# Patient Record
Sex: FEMALE | Race: WHITE | HISPANIC OR LATINO | Employment: FULL TIME | URBAN - METROPOLITAN AREA
[De-identification: names, ages, dates, MRNs, and addresses within clinical notes are randomized per-mention and may not be internally consistent; named-entity substitution may affect disease eponyms.]

---

## 2018-11-30 ENCOUNTER — APPOINTMENT (OUTPATIENT)
Dept: OBGYN | Facility: CLINIC | Age: 23
End: 2018-11-30

## 2019-05-03 ENCOUNTER — HOSPITAL ENCOUNTER (OUTPATIENT)
Dept: LAB | Facility: MEDICAL CENTER | Age: 24
End: 2019-05-03
Attending: SPECIALIST
Payer: COMMERCIAL

## 2019-05-03 PROCEDURE — 87624 HPV HI-RISK TYP POOLED RSLT: CPT

## 2019-05-03 PROCEDURE — 87491 CHLMYD TRACH DNA AMP PROBE: CPT

## 2019-05-03 PROCEDURE — 87591 N.GONORRHOEAE DNA AMP PROB: CPT

## 2019-05-03 PROCEDURE — 88175 CYTOPATH C/V AUTO FLUID REDO: CPT

## 2019-05-06 LAB
C TRACH DNA GENITAL QL NAA+PROBE: NEGATIVE
CYTOLOGY REG CYTOL: NORMAL
HPV HR 12 DNA CVX QL NAA+PROBE: NEGATIVE
HPV16 DNA SPEC QL NAA+PROBE: NEGATIVE
HPV18 DNA SPEC QL NAA+PROBE: NEGATIVE
N GONORRHOEA DNA GENITAL QL NAA+PROBE: NEGATIVE
SPECIMEN SOURCE: NORMAL
SPECIMEN SOURCE: NORMAL

## 2019-11-07 ENCOUNTER — APPOINTMENT (OUTPATIENT)
Dept: RADIOLOGY | Facility: MEDICAL CENTER | Age: 24
End: 2019-11-07
Attending: EMERGENCY MEDICINE
Payer: OTHER MISCELLANEOUS

## 2019-11-07 ENCOUNTER — HOSPITAL ENCOUNTER (EMERGENCY)
Facility: MEDICAL CENTER | Age: 24
End: 2019-11-07
Attending: EMERGENCY MEDICINE
Payer: OTHER MISCELLANEOUS

## 2019-11-07 VITALS
HEART RATE: 75 BPM | WEIGHT: 180 LBS | OXYGEN SATURATION: 96 % | DIASTOLIC BLOOD PRESSURE: 49 MMHG | TEMPERATURE: 98.2 F | RESPIRATION RATE: 16 BRPM | HEIGHT: 57 IN | SYSTOLIC BLOOD PRESSURE: 103 MMHG | BODY MASS INDEX: 38.83 KG/M2

## 2019-11-07 DIAGNOSIS — S20.219A CONTUSION OF CHEST WALL, UNSPECIFIED LATERALITY, INITIAL ENCOUNTER: ICD-10-CM
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
## 2019-11-07 PROCEDURE — 307740 HCHG GREEN TRAUMA TEAM SERVICES

## 2019-11-07 PROCEDURE — A9270 NON-COVERED ITEM OR SERVICE: HCPCS | Performed by: EMERGENCY MEDICINE

## 2019-11-07 PROCEDURE — 72100 X-RAY EXAM L-S SPINE 2/3 VWS: CPT

## 2019-11-07 PROCEDURE — 99284 EMERGENCY DEPT VISIT MOD MDM: CPT

## 2019-11-07 PROCEDURE — 71045 X-RAY EXAM CHEST 1 VIEW: CPT

## 2019-11-07 PROCEDURE — 700102 HCHG RX REV CODE 250 W/ 637 OVERRIDE(OP): Performed by: EMERGENCY MEDICINE

## 2019-11-07 RX ORDER — NAPROXEN 500 MG/1
500 TABLET ORAL
Qty: 20 TAB | Refills: 0 | Status: SHIPPED | OUTPATIENT
Start: 2019-11-07 | End: 2019-11-12

## 2019-11-07 RX ORDER — ACETAMINOPHEN 325 MG/1
975 TABLET ORAL ONCE
Status: COMPLETED | OUTPATIENT
Start: 2019-11-07 | End: 2019-11-07

## 2019-11-07 RX ADMIN — ACETAMINOPHEN 975 MG: 325 TABLET, FILM COATED ORAL at 17:14

## 2019-11-07 ASSESSMENT — LIFESTYLE VARIABLES: DO YOU DRINK ALCOHOL: NO

## 2019-11-08 NOTE — ED NOTES
Medication reconciliation updated and complete per pt at bedside  Allergies have been verified   No oral ABX within the last 14 days  Pt home pharmacy:[facility]

## 2019-11-08 NOTE — ED NOTES
Pt. Discharged at this time, pt. Provided with discharge paperwork, work excuse and prescriptions. Pt. Denies questions or concerns at this time, pt. Ambulated out of ED independently with a steady gait.
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
## 2020-01-06 ENCOUNTER — APPOINTMENT (OUTPATIENT)
Dept: RADIOLOGY | Facility: MEDICAL CENTER | Age: 25
End: 2020-01-06
Attending: EMERGENCY MEDICINE
Payer: OTHER MISCELLANEOUS

## 2020-01-06 ENCOUNTER — HOSPITAL ENCOUNTER (EMERGENCY)
Facility: MEDICAL CENTER | Age: 25
End: 2020-01-06
Attending: EMERGENCY MEDICINE
Payer: OTHER MISCELLANEOUS

## 2020-01-06 VITALS
DIASTOLIC BLOOD PRESSURE: 77 MMHG | HEIGHT: 57 IN | BODY MASS INDEX: 36.96 KG/M2 | OXYGEN SATURATION: 98 % | SYSTOLIC BLOOD PRESSURE: 130 MMHG | RESPIRATION RATE: 16 BRPM | HEART RATE: 80 BPM | TEMPERATURE: 98 F | WEIGHT: 171.3 LBS

## 2020-01-06 DIAGNOSIS — S09.90XA CLOSED HEAD INJURY, INITIAL ENCOUNTER: ICD-10-CM

## 2020-01-06 DIAGNOSIS — R51.9 ACUTE NONINTRACTABLE HEADACHE, UNSPECIFIED HEADACHE TYPE: ICD-10-CM

## 2020-01-06 DIAGNOSIS — R79.89 ABNORMAL LFTS: ICD-10-CM

## 2020-01-06 LAB
ALBUMIN SERPL BCP-MCNC: 4.6 G/DL (ref 3.2–4.9)
ALBUMIN/GLOB SERPL: 1.5 G/DL
ALP SERPL-CCNC: 81 U/L (ref 30–99)
ALT SERPL-CCNC: 86 U/L (ref 2–50)
ANION GAP SERPL CALC-SCNC: 11 MMOL/L (ref 0–11.9)
AST SERPL-CCNC: 49 U/L (ref 12–45)
BASOPHILS # BLD AUTO: 0.3 % (ref 0–1.8)
BASOPHILS # BLD: 0.03 K/UL (ref 0–0.12)
BILIRUB SERPL-MCNC: 0.5 MG/DL (ref 0.1–1.5)
BUN SERPL-MCNC: 12 MG/DL (ref 8–22)
CALCIUM SERPL-MCNC: 9.6 MG/DL (ref 8.5–10.5)
CHLORIDE SERPL-SCNC: 104 MMOL/L (ref 96–112)
CO2 SERPL-SCNC: 24 MMOL/L (ref 20–33)
CREAT SERPL-MCNC: 0.94 MG/DL (ref 0.5–1.4)
EOSINOPHIL # BLD AUTO: 0.16 K/UL (ref 0–0.51)
EOSINOPHIL NFR BLD: 1.7 % (ref 0–6.9)
ERYTHROCYTE [DISTWIDTH] IN BLOOD BY AUTOMATED COUNT: 42.5 FL (ref 35.9–50)
GLOBULIN SER CALC-MCNC: 3 G/DL (ref 1.9–3.5)
GLUCOSE SERPL-MCNC: 88 MG/DL (ref 65–99)
HCG SERPL QL: NEGATIVE
HCT VFR BLD AUTO: 43.9 % (ref 37–47)
HGB BLD-MCNC: 15.9 G/DL (ref 12–16)
IMM GRANULOCYTES # BLD AUTO: 0.03 K/UL (ref 0–0.11)
IMM GRANULOCYTES NFR BLD AUTO: 0.3 % (ref 0–0.9)
LYMPHOCYTES # BLD AUTO: 4.12 K/UL (ref 1–4.8)
LYMPHOCYTES NFR BLD: 44.3 % (ref 22–41)
MCH RBC QN AUTO: 34.2 PG (ref 27–33)
MCHC RBC AUTO-ENTMCNC: 36.2 G/DL (ref 33.6–35)
MCV RBC AUTO: 94.4 FL (ref 81.4–97.8)
MONOCYTES # BLD AUTO: 0.56 K/UL (ref 0–0.85)
MONOCYTES NFR BLD AUTO: 6 % (ref 0–13.4)
NEUTROPHILS # BLD AUTO: 4.39 K/UL (ref 2–7.15)
NEUTROPHILS NFR BLD: 47.4 % (ref 44–72)
NRBC # BLD AUTO: 0 K/UL
NRBC BLD-RTO: 0 /100 WBC
PLATELET # BLD AUTO: 163 K/UL (ref 164–446)
PMV BLD AUTO: 12.7 FL (ref 9–12.9)
POTASSIUM SERPL-SCNC: 4 MMOL/L (ref 3.6–5.5)
PROT SERPL-MCNC: 7.6 G/DL (ref 6–8.2)
RBC # BLD AUTO: 4.65 M/UL (ref 4.2–5.4)
SODIUM SERPL-SCNC: 139 MMOL/L (ref 135–145)
WBC # BLD AUTO: 9.3 K/UL (ref 4.8–10.8)

## 2020-01-06 PROCEDURE — 700117 HCHG RX CONTRAST REV CODE 255: Performed by: EMERGENCY MEDICINE

## 2020-01-06 PROCEDURE — 96375 TX/PRO/DX INJ NEW DRUG ADDON: CPT

## 2020-01-06 PROCEDURE — 85025 COMPLETE CBC W/AUTO DIFF WBC: CPT

## 2020-01-06 PROCEDURE — 36415 COLL VENOUS BLD VENIPUNCTURE: CPT

## 2020-01-06 PROCEDURE — 700105 HCHG RX REV CODE 258: Performed by: EMERGENCY MEDICINE

## 2020-01-06 PROCEDURE — 84703 CHORIONIC GONADOTROPIN ASSAY: CPT

## 2020-01-06 PROCEDURE — 96374 THER/PROPH/DIAG INJ IV PUSH: CPT

## 2020-01-06 PROCEDURE — 700111 HCHG RX REV CODE 636 W/ 250 OVERRIDE (IP): Performed by: EMERGENCY MEDICINE

## 2020-01-06 PROCEDURE — 70496 CT ANGIOGRAPHY HEAD: CPT

## 2020-01-06 PROCEDURE — 99284 EMERGENCY DEPT VISIT MOD MDM: CPT

## 2020-01-06 PROCEDURE — A9270 NON-COVERED ITEM OR SERVICE: HCPCS | Performed by: EMERGENCY MEDICINE

## 2020-01-06 PROCEDURE — 700102 HCHG RX REV CODE 250 W/ 637 OVERRIDE(OP): Performed by: EMERGENCY MEDICINE

## 2020-01-06 PROCEDURE — 70498 CT ANGIOGRAPHY NECK: CPT

## 2020-01-06 PROCEDURE — 80053 COMPREHEN METABOLIC PANEL: CPT

## 2020-01-06 RX ORDER — DIPHENHYDRAMINE HYDROCHLORIDE 50 MG/ML
25 INJECTION INTRAMUSCULAR; INTRAVENOUS ONCE
Status: COMPLETED | OUTPATIENT
Start: 2020-01-06 | End: 2020-01-06

## 2020-01-06 RX ORDER — METOCLOPRAMIDE HYDROCHLORIDE 5 MG/ML
10 INJECTION INTRAMUSCULAR; INTRAVENOUS ONCE
Status: COMPLETED | OUTPATIENT
Start: 2020-01-06 | End: 2020-01-06

## 2020-01-06 RX ORDER — SODIUM CHLORIDE 9 MG/ML
1000 INJECTION, SOLUTION INTRAVENOUS ONCE
Status: COMPLETED | OUTPATIENT
Start: 2020-01-06 | End: 2020-01-06

## 2020-01-06 RX ORDER — ACETAMINOPHEN 500 MG
1000 TABLET ORAL ONCE
Status: DISCONTINUED | OUTPATIENT
Start: 2020-01-06 | End: 2020-01-07 | Stop reason: HOSPADM

## 2020-01-06 RX ADMIN — IOHEXOL 60 ML: 350 INJECTION, SOLUTION INTRAVENOUS at 22:31

## 2020-01-06 RX ADMIN — METOCLOPRAMIDE 10 MG: 5 INJECTION, SOLUTION INTRAMUSCULAR; INTRAVENOUS at 21:02

## 2020-01-06 RX ADMIN — DIPHENHYDRAMINE HYDROCHLORIDE 25 MG: 50 INJECTION INTRAMUSCULAR; INTRAVENOUS at 21:01

## 2020-01-06 RX ADMIN — SODIUM CHLORIDE 1000 ML: 9 INJECTION, SOLUTION INTRAVENOUS at 21:02
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
## 2020-01-07 NOTE — ED NOTES
Patient discharged home per ERP order. POC discussed. PIV removed. No RX per ERP. Discharge teaching and education provided. Patient verbalized understanding of discharge teaching and education. Patient able to ambulate off unit with steady gait. Family to drive patient home.

## 2020-01-07 NOTE — DISCHARGE INSTRUCTIONS
Please discuss with your doctor your abnormal liver function tests and have these repeated within the next several days.

## 2020-07-09 ENCOUNTER — HOSPITAL ENCOUNTER (OUTPATIENT)
Dept: LAB | Facility: MEDICAL CENTER | Age: 25
End: 2020-07-09
Attending: OBSTETRICS & GYNECOLOGY
Payer: COMMERCIAL

## 2020-07-09 LAB
ABO GROUP BLD: NORMAL
BASOPHILS # BLD AUTO: 0.6 % (ref 0–1.8)
BASOPHILS # BLD: 0.04 K/UL (ref 0–0.12)
DHEA-S SERPL-MCNC: 135 UG/DL (ref 98.8–340)
EOSINOPHIL # BLD AUTO: 0.16 K/UL (ref 0–0.51)
EOSINOPHIL NFR BLD: 2.3 % (ref 0–6.9)
ERYTHROCYTE [DISTWIDTH] IN BLOOD BY AUTOMATED COUNT: 42.4 FL (ref 35.9–50)
HCT VFR BLD AUTO: 46.4 % (ref 37–47)
HGB BLD-MCNC: 15.9 G/DL (ref 12–16)
IMM GRANULOCYTES # BLD AUTO: 0.02 K/UL (ref 0–0.11)
IMM GRANULOCYTES NFR BLD AUTO: 0.3 % (ref 0–0.9)
LYMPHOCYTES # BLD AUTO: 3.62 K/UL (ref 1–4.8)
LYMPHOCYTES NFR BLD: 51.5 % (ref 22–41)
MCH RBC QN AUTO: 32.6 PG (ref 27–33)
MCHC RBC AUTO-ENTMCNC: 34.3 G/DL (ref 33.6–35)
MCV RBC AUTO: 95.1 FL (ref 81.4–97.8)
MONOCYTES # BLD AUTO: 0.46 K/UL (ref 0–0.85)
MONOCYTES NFR BLD AUTO: 6.5 % (ref 0–13.4)
NEUTROPHILS # BLD AUTO: 2.73 K/UL (ref 2–7.15)
NEUTROPHILS NFR BLD: 38.8 % (ref 44–72)
NRBC # BLD AUTO: 0 K/UL
NRBC BLD-RTO: 0 /100 WBC
PLATELET # BLD AUTO: 132 K/UL (ref 164–446)
PMV BLD AUTO: 14.2 FL (ref 9–12.9)
PROLACTIN SERPL-MCNC: 13 NG/ML (ref 2.8–26)
RBC # BLD AUTO: 4.88 M/UL (ref 4.2–5.4)
RH BLD: NORMAL
RUBV AB SER QL: 45.3 IU/ML
TESTOST SERPL-MCNC: 15 NG/DL (ref 9–75)
TSH SERPL DL<=0.005 MIU/L-ACNC: 2.91 UIU/ML (ref 0.38–5.33)
WBC # BLD AUTO: 7 K/UL (ref 4.8–10.8)

## 2020-07-09 PROCEDURE — 86901 BLOOD TYPING SEROLOGIC RH(D): CPT

## 2020-07-09 PROCEDURE — 84403 ASSAY OF TOTAL TESTOSTERONE: CPT

## 2020-07-09 PROCEDURE — 83525 ASSAY OF INSULIN: CPT

## 2020-07-09 PROCEDURE — 86762 RUBELLA ANTIBODY: CPT

## 2020-07-09 PROCEDURE — 84146 ASSAY OF PROLACTIN: CPT

## 2020-07-09 PROCEDURE — 82627 DEHYDROEPIANDROSTERONE: CPT

## 2020-07-09 PROCEDURE — 85025 COMPLETE CBC W/AUTO DIFF WBC: CPT

## 2020-07-09 PROCEDURE — 86900 BLOOD TYPING SEROLOGIC ABO: CPT

## 2020-07-09 PROCEDURE — 36415 COLL VENOUS BLD VENIPUNCTURE: CPT

## 2020-07-09 PROCEDURE — 82952 GTT-ADDED SAMPLES: CPT

## 2020-07-09 PROCEDURE — 84443 ASSAY THYROID STIM HORMONE: CPT

## 2020-07-09 PROCEDURE — 83520 IMMUNOASSAY QUANT NOS NONAB: CPT

## 2020-07-09 PROCEDURE — 82951 GLUCOSE TOLERANCE TEST (GTT): CPT

## 2020-07-11 LAB
GLUCOSE 1H P CHAL SERPL-MCNC: 154 MG/DL (ref 65–199)
GLUCOSE 2H P CHAL SERPL-MCNC: 137 MG/DL (ref 65–139)
GLUCOSE BS SERPL-MCNC: 102 MG/DL (ref 65–99)
INSULIN 1H P CHAL SERPL-ACNC: 267 UIU/ML (ref 29–88)
INSULIN 2H P CHAL SERPL-ACNC: 42 UIU/ML (ref 22–79)
INSULIN P FAST SERPL-ACNC: 42 UIU/ML (ref 3–19)
MIS SERPL-MCNC: 4.28 NG/ML (ref 0.4–16.02)

## 2020-12-23 ENCOUNTER — HOSPITAL ENCOUNTER (OUTPATIENT)
Dept: LAB | Facility: MEDICAL CENTER | Age: 25
End: 2020-12-23
Attending: OBSTETRICS & GYNECOLOGY
Payer: COMMERCIAL

## 2020-12-23 LAB — B-HCG SERPL-ACNC: 116 MIU/ML (ref 0–5)

## 2020-12-23 PROCEDURE — 36415 COLL VENOUS BLD VENIPUNCTURE: CPT

## 2020-12-23 PROCEDURE — 84702 CHORIONIC GONADOTROPIN TEST: CPT

## 2020-12-29 ENCOUNTER — HOSPITAL ENCOUNTER (OUTPATIENT)
Dept: LAB | Facility: MEDICAL CENTER | Age: 25
End: 2020-12-29
Attending: OBSTETRICS & GYNECOLOGY
Payer: COMMERCIAL

## 2020-12-29 LAB — B-HCG SERPL-ACNC: 2097 MIU/ML (ref 0–5)

## 2020-12-29 PROCEDURE — 36415 COLL VENOUS BLD VENIPUNCTURE: CPT

## 2020-12-29 PROCEDURE — 84702 CHORIONIC GONADOTROPIN TEST: CPT

## 2021-02-16 ENCOUNTER — HOSPITAL ENCOUNTER (OUTPATIENT)
Dept: LAB | Facility: MEDICAL CENTER | Age: 26
End: 2021-02-16
Attending: OBSTETRICS & GYNECOLOGY
Payer: COMMERCIAL

## 2021-02-16 PROCEDURE — 87591 N.GONORRHOEAE DNA AMP PROB: CPT

## 2021-02-16 PROCEDURE — 87491 CHLMYD TRACH DNA AMP PROBE: CPT

## 2021-02-16 PROCEDURE — 88175 CYTOPATH C/V AUTO FLUID REDO: CPT

## 2021-02-17 LAB
C TRACH DNA GENITAL QL NAA+PROBE: NEGATIVE
CYTOLOGY REG CYTOL: NORMAL
N GONORRHOEA DNA GENITAL QL NAA+PROBE: NEGATIVE
SPECIMEN SOURCE: NORMAL

## 2021-03-16 ENCOUNTER — HOSPITAL ENCOUNTER (OUTPATIENT)
Facility: MEDICAL CENTER | Age: 26
End: 2021-03-16
Attending: OBSTETRICS & GYNECOLOGY
Payer: COMMERCIAL

## 2021-03-16 LAB
ABO GROUP BLD: NORMAL
BASOPHILS # BLD AUTO: 0.1 % (ref 0–1.8)
BASOPHILS # BLD: 0.01 K/UL (ref 0–0.12)
BLD GP AB SCN SERPL QL: NORMAL
EOSINOPHIL # BLD AUTO: 0.08 K/UL (ref 0–0.51)
EOSINOPHIL NFR BLD: 0.8 % (ref 0–6.9)
ERYTHROCYTE [DISTWIDTH] IN BLOOD BY AUTOMATED COUNT: 49.2 FL (ref 35.9–50)
HBV SURFACE AG SER QL: NORMAL
HCT VFR BLD AUTO: 40 % (ref 37–47)
HCV AB SER QL: NORMAL
HGB BLD-MCNC: 13.6 G/DL (ref 12–16)
HIV 1+2 AB+HIV1 P24 AG SERPL QL IA: NORMAL
IMM GRANULOCYTES # BLD AUTO: 0.05 K/UL (ref 0–0.11)
IMM GRANULOCYTES NFR BLD AUTO: 0.5 % (ref 0–0.9)
LYMPHOCYTES # BLD AUTO: 2.2 K/UL (ref 1–4.8)
LYMPHOCYTES NFR BLD: 23.3 % (ref 22–41)
MCH RBC QN AUTO: 32.9 PG (ref 27–33)
MCHC RBC AUTO-ENTMCNC: 34 G/DL (ref 33.6–35)
MCV RBC AUTO: 96.6 FL (ref 81.4–97.8)
MONOCYTES # BLD AUTO: 0.5 K/UL (ref 0–0.85)
MONOCYTES NFR BLD AUTO: 5.3 % (ref 0–13.4)
NEUTROPHILS # BLD AUTO: 6.62 K/UL (ref 2–7.15)
NEUTROPHILS NFR BLD: 70 % (ref 44–72)
NRBC # BLD AUTO: 0 K/UL
NRBC BLD-RTO: 0 /100 WBC
PLATELET # BLD AUTO: 129 K/UL (ref 164–446)
PMV BLD AUTO: 13.9 FL (ref 9–12.9)
RBC # BLD AUTO: 4.14 M/UL (ref 4.2–5.4)
RH BLD: NORMAL
RUBV AB SER QL: 49 IU/ML
TREPONEMA PALLIDUM IGG+IGM AB [PRESENCE] IN SERUM OR PLASMA BY IMMUNOASSAY: NORMAL
WBC # BLD AUTO: 9.5 K/UL (ref 4.8–10.8)

## 2021-03-16 PROCEDURE — 85025 COMPLETE CBC W/AUTO DIFF WBC: CPT

## 2021-03-16 PROCEDURE — 86850 RBC ANTIBODY SCREEN: CPT

## 2021-03-16 PROCEDURE — 86780 TREPONEMA PALLIDUM: CPT

## 2021-03-16 PROCEDURE — 87086 URINE CULTURE/COLONY COUNT: CPT

## 2021-03-16 PROCEDURE — 87340 HEPATITIS B SURFACE AG IA: CPT

## 2021-03-16 PROCEDURE — 86803 HEPATITIS C AB TEST: CPT

## 2021-03-16 PROCEDURE — 87389 HIV-1 AG W/HIV-1&-2 AB AG IA: CPT

## 2021-03-16 PROCEDURE — 86901 BLOOD TYPING SEROLOGIC RH(D): CPT

## 2021-03-16 PROCEDURE — 36415 COLL VENOUS BLD VENIPUNCTURE: CPT

## 2021-03-16 PROCEDURE — 86762 RUBELLA ANTIBODY: CPT

## 2021-03-16 PROCEDURE — 86900 BLOOD TYPING SEROLOGIC ABO: CPT

## 2021-03-18 LAB
BACTERIA UR CULT: NORMAL
SIGNIFICANT IND 70042: NORMAL
SITE SITE: NORMAL
SOURCE SOURCE: NORMAL

## 2021-04-16 ENCOUNTER — HOSPITAL ENCOUNTER (EMERGENCY)
Facility: MEDICAL CENTER | Age: 26
End: 2021-04-16
Attending: OBSTETRICS & GYNECOLOGY | Admitting: OBSTETRICS & GYNECOLOGY
Payer: COMMERCIAL

## 2021-04-16 VITALS
HEART RATE: 78 BPM | BODY MASS INDEX: 32.36 KG/M2 | DIASTOLIC BLOOD PRESSURE: 57 MMHG | WEIGHT: 150 LBS | HEIGHT: 57 IN | TEMPERATURE: 97.6 F | RESPIRATION RATE: 16 BRPM | OXYGEN SATURATION: 95 % | SYSTOLIC BLOOD PRESSURE: 110 MMHG

## 2021-04-16 LAB
APPEARANCE UR: CLEAR
COLOR UR AUTO: ABNORMAL
GLUCOSE UR QL STRIP.AUTO: NEGATIVE MG/DL
KETONES UR QL STRIP.AUTO: NEGATIVE MG/DL
LEUKOCYTE ESTERASE UR QL STRIP.AUTO: NEGATIVE
NITRITE UR QL STRIP.AUTO: NEGATIVE
PH UR STRIP.AUTO: 7 [PH] (ref 5–8)
PROT UR QL STRIP: ABNORMAL MG/DL
RBC UR QL AUTO: NEGATIVE
SP GR UR STRIP.AUTO: 1.02 (ref 1–1.03)

## 2021-04-16 PROCEDURE — 302449 STATCHG TRIAGE ONLY (STATISTIC)

## 2021-04-16 PROCEDURE — 81002 URINALYSIS NONAUTO W/O SCOPE: CPT

## 2021-04-16 ASSESSMENT — PAIN SCALES - GENERAL: PAINLEVEL: 2

## 2021-04-16 ASSESSMENT — FIBROSIS 4 INDEX: FIB4 SCORE: 0.98
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
## 2021-04-22 ENCOUNTER — HOSPITAL ENCOUNTER (OUTPATIENT)
Facility: MEDICAL CENTER | Age: 26
End: 2021-04-22
Attending: OBSTETRICS & GYNECOLOGY
Payer: COMMERCIAL

## 2021-04-22 ENCOUNTER — HOSPITAL ENCOUNTER (OUTPATIENT)
Dept: LAB | Facility: MEDICAL CENTER | Age: 26
End: 2021-04-22
Attending: OBSTETRICS & GYNECOLOGY
Payer: COMMERCIAL

## 2021-05-07 ENCOUNTER — INITIAL PRENATAL (OUTPATIENT)
Dept: OBGYN | Facility: CLINIC | Age: 26
End: 2021-05-07
Payer: COMMERCIAL

## 2021-05-07 VITALS — SYSTOLIC BLOOD PRESSURE: 99 MMHG | WEIGHT: 162 LBS | DIASTOLIC BLOOD PRESSURE: 61 MMHG | BODY MASS INDEX: 35.06 KG/M2

## 2021-05-07 DIAGNOSIS — Z86.2 HISTORY OF THROMBOCYTOPENIA: ICD-10-CM

## 2021-05-07 DIAGNOSIS — Z87.42 HISTORY OF PCOS: ICD-10-CM

## 2021-05-07 DIAGNOSIS — Z34.82 ENCOUNTER FOR SUPERVISION OF OTHER NORMAL PREGNANCY IN SECOND TRIMESTER: ICD-10-CM

## 2021-05-07 PROCEDURE — 90040 PR PRENATAL FOLLOW UP: CPT | Performed by: ADVANCED PRACTICE MIDWIFE

## 2021-05-07 ASSESSMENT — FIBROSIS 4 INDEX: FIB4 SCORE: 1.02

## 2021-05-07 ASSESSMENT — EDINBURGH POSTNATAL DEPRESSION SCALE (EPDS)
THE THOUGHT OF HARMING MYSELF HAS OCCURRED TO ME: NEVER
I HAVE FELT SCARED OR PANICKY FOR NO GOOD REASON: NO, NOT AT ALL
I HAVE BEEN SO UNHAPPY THAT I HAVE BEEN CRYING: NO, NEVER
I HAVE BEEN ANXIOUS OR WORRIED FOR NO GOOD REASON: NO, NOT AT ALL
I HAVE BEEN SO UNHAPPY THAT I HAVE HAD DIFFICULTY SLEEPING: NOT AT ALL
I HAVE BLAMED MYSELF UNNECESSARILY WHEN THINGS WENT WRONG: NO, NEVER
TOTAL SCORE: 0
I HAVE LOOKED FORWARD WITH ENJOYMENT TO THINGS: AS MUCH AS I EVER DID
I HAVE BEEN ABLE TO LAUGH AND SEE THE FUNNY SIDE OF THINGS: AS MUCH AS I ALWAYS COULD
I HAVE FELT SAD OR MISERABLE: NO, NOT AT ALL
THINGS HAVE BEEN GETTING ON TOP OF ME: NO, I HAVE BEEN COPING AS WELL AS EVER

## 2021-05-07 NOTE — PROGRESS NOTES
Pt. Here for NOB visit today.  #[phone]  First prenatal care  Pt. States no concerns   Pharmacy verified

## 2021-05-07 NOTE — PROGRESS NOTES
Transfer of care- records in chart    Risk factors:   History of thrombocytopenia  Referrals made today:   none    Subjective:   Nataliia Falk is a 26 y.o.  who presents for her new OB exam.  She is 24w2d with an EDWIGE of Estimated Date of Delivery: 21 by LMP.  This pregnancy was a stimulated cycle with a trigger shot.  She is feeling well and has no concerns at this time. She reports one ER visits. Reports good fetal movement.    Vaginal bleeding no  Pain   no  Cramping  no    Past Medical History:   Diagnosis Date   • Anemia 10/16/2012   • Decreased platelet count (HCC) 2012   • Pregnancy        History reviewed. No pertinent surgical history.     OB History    Para Term  AB Living   2 1 1     1   SAB TAB Ectopic Molar Multiple Live Births             1      # Outcome Date GA Lbr Memo/2nd Weight Sex Delivery Anes PTL Lv   2 Current            1 Term 13   3.204 kg (7 lb 1 oz) F Vag-Spont   TAYLOR      Birth Comments: System Generated. Please review and update pregnancy details.        Gynecological History  STIs  no  HSV  no  Abnormal pap no      Family History   Problem Relation Age of Onset   • Cancer Paternal Grandmother         breast cancer    • Cancer Paternal Grandfather      Denies any genetic disorders in family history.     FOB is involved   Pregnancy is planned and desired.    She is currently working as a  and planning to continue working. She has not had COVID vaccine or flu shot.  .   Denies alcohol use, drug use, or tobacco use in pregnancy.     Denies any current or hx of sexual, emotional or physical abuse or trauma.     Current Medications: PNV, metformin 2000mg daily    Allergies: NKDA      Objective:      Vitals:    21 0838   BP: (!) 99/61   Weight: 73.5 kg (162 lb)        See Prenatal Physical and Prenatal Vitals  UA WNL today      Assessment:      1.  IUP @ 24w2d per US c/w LMP      2.  S=D      3.  See problem list as follows        Patient Active Problem List    Diagnosis Date Noted   • Active labor 11/16/2012   • Anemia 10/16/2012   • Decreased platelet count (HCC) 06/20/2012   • Supervision of normal first pregnancy 04/27/2012         Plan:   1. Provided patient with 3rd trimester labs. She will wait 3-4 weeks.  2. Discussed with patient that she should stop metformin. It is not indicated for limited weight gain in pregnancy. Also, it can skew glucose tolerance test results. She voices understanding.   3. We discussed practice set up and care.  She voices understanding. We also discussed indications for additional ultrasound.

## 2021-05-09 ENCOUNTER — HOSPITAL ENCOUNTER (EMERGENCY)
Facility: MEDICAL CENTER | Age: 26
End: 2021-05-09
Attending: OBSTETRICS & GYNECOLOGY | Admitting: OBSTETRICS & GYNECOLOGY
Payer: COMMERCIAL

## 2021-05-09 ENCOUNTER — APPOINTMENT (OUTPATIENT)
Dept: RADIOLOGY | Facility: MEDICAL CENTER | Age: 26
End: 2021-05-09
Attending: ADVANCED PRACTICE MIDWIFE
Payer: COMMERCIAL

## 2021-05-09 VITALS
TEMPERATURE: 96.5 F | RESPIRATION RATE: 18 BRPM | DIASTOLIC BLOOD PRESSURE: 72 MMHG | SYSTOLIC BLOOD PRESSURE: 116 MMHG | BODY MASS INDEX: 34.52 KG/M2 | WEIGHT: 160 LBS | HEIGHT: 57 IN | HEART RATE: 79 BPM | OXYGEN SATURATION: 100 %

## 2021-05-09 LAB
ALBUMIN SERPL BCP-MCNC: 3.4 G/DL (ref 3.2–4.9)
ALBUMIN/GLOB SERPL: 1.2 G/DL
ALP SERPL-CCNC: 74 U/L (ref 30–99)
ALT SERPL-CCNC: 99 U/L (ref 2–50)
ANION GAP SERPL CALC-SCNC: 9 MMOL/L (ref 7–16)
AST SERPL-CCNC: 183 U/L (ref 12–45)
BASOPHILS # BLD AUTO: 0.3 % (ref 0–1.8)
BASOPHILS # BLD: 0.03 K/UL (ref 0–0.12)
BILIRUB SERPL-MCNC: 0.6 MG/DL (ref 0.1–1.5)
BUN SERPL-MCNC: 6 MG/DL (ref 8–22)
CALCIUM SERPL-MCNC: 9 MG/DL (ref 8.5–10.5)
CHLORIDE SERPL-SCNC: 106 MMOL/L (ref 96–112)
CO2 SERPL-SCNC: 22 MMOL/L (ref 20–33)
CREAT SERPL-MCNC: 0.48 MG/DL (ref 0.5–1.4)
EOSINOPHIL # BLD AUTO: 0.08 K/UL (ref 0–0.51)
EOSINOPHIL NFR BLD: 0.7 % (ref 0–6.9)
ERYTHROCYTE [DISTWIDTH] IN BLOOD BY AUTOMATED COUNT: 50.5 FL (ref 35.9–50)
GLOBULIN SER CALC-MCNC: 2.8 G/DL (ref 1.9–3.5)
GLUCOSE SERPL-MCNC: 157 MG/DL (ref 65–99)
HCT VFR BLD AUTO: 35.1 % (ref 37–47)
HGB BLD-MCNC: 12 G/DL (ref 12–16)
IMM GRANULOCYTES # BLD AUTO: 0.1 K/UL (ref 0–0.11)
IMM GRANULOCYTES NFR BLD AUTO: 0.9 % (ref 0–0.9)
LYMPHOCYTES # BLD AUTO: 1.89 K/UL (ref 1–4.8)
LYMPHOCYTES NFR BLD: 16.4 % (ref 22–41)
MCH RBC QN AUTO: 34 PG (ref 27–33)
MCHC RBC AUTO-ENTMCNC: 34.2 G/DL (ref 33.6–35)
MCV RBC AUTO: 99.4 FL (ref 81.4–97.8)
MONOCYTES # BLD AUTO: 0.69 K/UL (ref 0–0.85)
MONOCYTES NFR BLD AUTO: 6 % (ref 0–13.4)
NEUTROPHILS # BLD AUTO: 8.7 K/UL (ref 2–7.15)
NEUTROPHILS NFR BLD: 75.7 % (ref 44–72)
NRBC # BLD AUTO: 0 K/UL
NRBC BLD-RTO: 0 /100 WBC
PLATELET # BLD AUTO: 111 K/UL (ref 164–446)
PMV BLD AUTO: 13.8 FL (ref 9–12.9)
POTASSIUM SERPL-SCNC: 3.4 MMOL/L (ref 3.6–5.5)
PROT SERPL-MCNC: 6.2 G/DL (ref 6–8.2)
RBC # BLD AUTO: 3.53 M/UL (ref 4.2–5.4)
SODIUM SERPL-SCNC: 137 MMOL/L (ref 135–145)
WBC # BLD AUTO: 11.5 K/UL (ref 4.8–10.8)

## 2021-05-09 PROCEDURE — 81002 URINALYSIS NONAUTO W/O SCOPE: CPT

## 2021-05-09 PROCEDURE — 36415 COLL VENOUS BLD VENIPUNCTURE: CPT

## 2021-05-09 PROCEDURE — 99283 EMERGENCY DEPT VISIT LOW MDM: CPT

## 2021-05-09 PROCEDURE — 85025 COMPLETE CBC W/AUTO DIFF WBC: CPT

## 2021-05-09 PROCEDURE — 80053 COMPREHEN METABOLIC PANEL: CPT

## 2021-05-09 PROCEDURE — 700102 HCHG RX REV CODE 250 W/ 637 OVERRIDE(OP): Performed by: ADVANCED PRACTICE MIDWIFE

## 2021-05-09 PROCEDURE — A9270 NON-COVERED ITEM OR SERVICE: HCPCS | Performed by: ADVANCED PRACTICE MIDWIFE

## 2021-05-09 PROCEDURE — 700111 HCHG RX REV CODE 636 W/ 250 OVERRIDE (IP): Performed by: ADVANCED PRACTICE MIDWIFE

## 2021-05-09 PROCEDURE — 76705 ECHO EXAM OF ABDOMEN: CPT

## 2021-05-09 RX ORDER — PANTOPRAZOLE SODIUM 20 MG/1
20 TABLET, DELAYED RELEASE ORAL DAILY
Qty: 90 TABLET | Refills: 1 | Status: ON HOLD
Start: 2021-05-09 | End: 2021-08-30

## 2021-05-09 RX ORDER — ONDANSETRON 4 MG/1
4 TABLET, ORALLY DISINTEGRATING ORAL EVERY 6 HOURS PRN
Qty: 30 TABLET | Refills: 1 | Status: ON HOLD
Start: 2021-05-09 | End: 2021-08-30

## 2021-05-09 RX ORDER — URSODIOL 300 MG/1
300 CAPSULE ORAL 2 TIMES DAILY
Qty: 60 CAPSULE | Refills: 3 | Status: ON HOLD
Start: 2021-05-09 | End: 2021-08-30

## 2021-05-09 RX ORDER — ONDANSETRON 4 MG/1
4 TABLET, ORALLY DISINTEGRATING ORAL EVERY 4 HOURS PRN
Status: DISCONTINUED | OUTPATIENT
Start: 2021-05-09 | End: 2021-05-09 | Stop reason: HOSPADM

## 2021-05-09 RX ADMIN — HYDROCODONE BITARTRATE AND ACETAMINOPHEN 7.5 MG: 7.5; 325 SOLUTION ORAL at 04:26

## 2021-05-09 RX ADMIN — ONDANSETRON 4 MG: 4 TABLET, ORALLY DISINTEGRATING ORAL at 03:38

## 2021-05-09 RX ADMIN — LIDOCAINE HYDROCHLORIDE 30 ML: 20 SOLUTION OROPHARYNGEAL at 03:59

## 2021-05-09 ASSESSMENT — ENCOUNTER SYMPTOMS
SHORTNESS OF BREATH: 1
NAUSEA: 1
ABDOMINAL PAIN: 1
BACK PAIN: 1
FLANK PAIN: 0
DIARRHEA: 0
CONSTIPATION: 0
NEUROLOGICAL NEGATIVE: 1
HEARTBURN: 1
EYES NEGATIVE: 1
VOMITING: 1
NERVOUS/ANXIOUS: 1

## 2021-05-09 ASSESSMENT — PAIN DESCRIPTION - PAIN TYPE
TYPE: ACUTE PAIN

## 2021-05-09 ASSESSMENT — FIBROSIS 4 INDEX: FIB4 SCORE: 1.02

## 2021-05-09 ASSESSMENT — PAIN SCALES - GENERAL: PAINLEVEL: 10 - WORST POSSIBLE PAIN

## 2021-05-09 NOTE — DISCHARGE INSTRUCTIONS
"General Instructions:  · If you think you are in labor, time contractions (lying on your left side) from the beginning of one contraction to the beginning of the next contraction for at least one hour.  · Increase fluid intake: you should consume 10-12 8 oz glasses of non-caffeinated fluid per day.  · Report any pressure or burning on urination to your physician.  · Monitor fetal movement: If you notice an absence or decrease in fetal movement, drink a large glass of water and rest on your side.  If there is no increase in movement, call your physician or go to the hospital for further evaluation.  · Report any sudden, sharp abdominal pain.  · Report any bleeding.  Spotting or pinkish discharge is normal after vaginal exam.  You may also spot after sexual intercourse.    Pre-term Labor (<37 weeks):  Call your physician or return to the hospital if:  · You have painless regular contractions more than 4 in one hour.  · Your water breaks (remember time and color).  · You have menstrual-like cramps, a low dull backache or pressure in your pelvis or back.  · Your baby does not move enough to complete the daily kick count (10 movements in 2 hours).  · Your baby moves much less often than on the days before or you have not felt your baby move all day.  · Please review the MEDICATION LIST section of your AFTER VISIT SUMMARY document.  · Take your medication as prescribed      Other Instructions:   RX's.   Please carefully review your entire AFTER VISIT SUMMARY document for all discharge instructions.    Cholelithiasis    Cholelithiasis is also called \"gallstones.\" It is a kind of gallbladder disease. The gallbladder is an organ that stores a liquid (bile) that helps you digest fat. Gallstones may not cause symptoms (may be silent gallstones) until they cause a blockage, and then they can cause pain (gallbladder attack).  Follow these instructions at home:  · Take over-the-counter and prescription medicines only as " "told by your doctor.  · Stay at a healthy weight.  · Eat healthy foods. This includes:  ? Eating fewer fatty foods, like fried foods.  ? Eating fewer refined carbs (refined carbohydrates). Refined carbs are breads and grains that are highly processed, like white bread and white rice. Instead, choose whole grains like whole-wheat bread and brown rice.  ? Eating more fiber. Almonds, fresh fruit, and beans are healthy sources of fiber.  · Keep all follow-up visits as told by your doctor. This is important.  Contact a doctor if:  · You have sudden pain in the upper right side of your belly (abdomen). Pain might spread to your right shoulder or your chest. This may be a sign of a gallbladder attack.  · You feel sick to your stomach (are nauseous).  · You throw up (vomit).  · You have been diagnosed with gallstones that have no symptoms and you get:  ? Belly pain.  ? Discomfort, burning, or fullness in the upper part of your belly (indigestion).  Get help right away if:  · You have sudden pain in the upper right side of your belly, and it lasts for more than 2 hours.  · You have belly pain that lasts for more than 5 hours.  · You have a fever or chills.  · You keep feeling sick to your stomach or you keep throwing up.  · Your skin or the whites of your eyes turn yellow (jaundice).  · You have dark-colored pee (urine).  · You have light-colored poop (stool).  Summary  · Cholelithiasis is also called \"gallstones.\"  · The gallbladder is an organ that stores a liquid (bile) that helps you digest fat.  · Silent gallstones are gallstones that do not cause symptoms.  · A gallbladder attack may cause sudden pain in the upper right side of your belly. Pain might spread to your right shoulder or your chest. If this happens, contact your doctor.  · If you have sudden pain in the upper right side of your belly that lasts for more than 2 hours, get help right away.  This information is not intended to replace advice given to you by " your health care provider. Make sure you discuss any questions you have with your health care provider.  Document Released: 06/05/2009 Document Revised: 11/30/2018 Document Reviewed: 09/03/2017  Elsevier Patient Education © 2020 [employer]

## 2021-05-09 NOTE — ED PROVIDER NOTES
"Emergency Obstetric Consultation     Date of Service  2021    Reason for Consultation  Right upper quadrant abdominal pain.     History of Presenting Illness  26 y.o. female who presented 2021 with right upper quadrant pain. She reports that this pain started about 5 hours prior to presentation. She has had this pain before but reports that this is \"more intense\" than previous episode 3 weeks ago after eating greasy food.   She ate a hamburger for dinner. She reports a few hours later she had pain starting in her right upper abdomen and radiating to her right upper back. She has also had continued nausea and one episode of vomiting. She has not tried any medication for this pain.     Of note, she just stopped metformin 2000mg daily which she was taking for infertility and PCOS.     Review of Systems  Review of Systems   HENT: Negative.    Eyes: Negative.    Respiratory: Positive for shortness of breath.    Gastrointestinal: Positive for abdominal pain, heartburn, nausea and vomiting. Negative for constipation and diarrhea.   Genitourinary: Negative for dysuria, flank pain, frequency and urgency.   Musculoskeletal: Positive for back pain.   Skin: Negative.    Neurological: Negative.    Psychiatric/Behavioral: The patient is nervous/anxious.        Obstetric History    OB History    Para Term  AB Living   2 1 1     1   SAB TAB Ectopic Molar Multiple Live Births             1      # Outcome Date GA Lbr Memo/2nd Weight Sex Delivery Anes PTL Lv   2 Current            1 Term 12   3.204 kg (7 lb 1 oz) F Vag-Spont   TAYLOR      Birth Comments: System Generated. Please review and update pregnancy details.         Medical History   has a past medical history of Anemia (10/16/2012), Decreased platelet count (HCC) (2012), and Pregnancy. She also has no past medical history of Addisons disease ([facility]), Adrenal disorder ([facility]), Allergy, Anxiety, Arrhythmia, Arthritis, Asthma, Blood transfusion, " "Cancer (HCC), Cataract, CHF (congestive heart failure) (HCC), Clotting disorder (HCC), COPD, Cushings syndrome (HCC), Depression, Diabetes (HCC), Diabetic neuropathy (HCC), GERD (gastroesophageal reflux disease), Glaucoma, Goiter, Head ache, Headache(784.0), Heart attack (HCC), Heart murmur, HIV (human immunodeficiency virus infection), Hyperlipidemia, Hypertension, IBD (inflammatory bowel disease), Kidney disease, Meningitis, Migraine, Muscle disorder, OSTEOPOROSIS, Parathyroid disorder (HCC), Pituitary disease (HCC), Pulmonary emphysema (HCC), Seizure (HCC), Sickle cell disease (HCC), Stroke (HCC), Substance abuse (HCC), Thyroid disease, Tuberculosis, Ulcer, or Urinary tract infection, site not specified.    Surgical History   has no past surgical history on file.    Family History  family history includes Cancer in her paternal grandfather and paternal grandmother.    Social History   reports that she has never smoked. She has never used smokeless tobacco. She reports previous drug use. She reports that she does not drink alcohol.    Medications  None       Allergies  No Known Allergies    Physical Exam  Vitals:    21 0259   BP: 116/72   Pulse: 88   Resp: 20   Temp: 35.8 °C (96.5 °F)   TempSrc: Temporal   SpO2: 98%   Weight: 72.6 kg (160 lb)   Height: 1.448 m (4' 9\")       Physical Exam    Laboratory               No results for input(s): NTPROBNP in the last 72 hours.         Imaging  pending    Assessment  No new Assessment & Plan notes have been filed under this hospital service since the last note was generated.  Service: Obstetrics & Gynecology    Nataliia Falk 26 y.o. year old  24w4d with right upper abdominal pain consistent with likely cholecystitis.     Plan  1. Discussed with patient addressing her pain. We will use GI cocktail, liquid norco, and zofran. Ultrasound ordered for patient as well. IV hydration not indicated based on UA at presentation. Plan to oral hydrate once " nausea is resolved.   2. Will likely need dietary counseling and can consider use of Actigall if she has another episode. Will need prescription for zofran and omeprazole at discharge.     NIYA Coffman

## 2021-05-09 NOTE — PROGRESS NOTES
0250- pt is a , 24.4 weeks gestation IUP, with c/o constant upper abdominal pain, radiating to right mid back, which is tender to palpation. Pt states she ate a hamburger around 1930, and then the pain started around 2330, which led to vomiting from [id number]. Pt states she has one other episode like this before but it stopped on its own after about an hour, and thought the same would happen tonight, but when it continued decided to come in. No c/o lof, bleeding, dfm or uc's. efm and toco placed, vss. UA obtained.  0320- Rick MCCLAIN called and given report, on her way to see pt.  0330- Rick MCCLAIN at bedside for assessment and discussing poc. Received orders for meds, po hydrate and US.   0445- Rick MCCLAIN at bedside updating pt on plan for labs, then discharge home with RXs for gallstones and nausea (see OB note)  0605- Rick MCCLAIN called and updated with lab results, received orders to discharge home with scheduled follow up and labs in two weeks.  0625- addressed discharge instructions with PTL precautions, appropriate diet for gallstones, and  RX at pharmacy, all questions answered, verbalized understanding. Left off floor stable with friend at side.

## 2021-05-09 NOTE — CARE PLAN
Problem: Pain Management  Goal: Pain level will decrease to patient's comfort goal  5/9/2021 0628 by Charley Cuadra R.N.  Outcome: MET  5/9/2021 0501 by Charley Cuadra R.N.  Outcome: PROGRESSING AS EXPECTED

## 2021-05-10 LAB
APPEARANCE UR: ABNORMAL
COLOR UR AUTO: YELLOW
GLUCOSE UR QL STRIP.AUTO: NEGATIVE MG/DL
KETONES UR QL STRIP.AUTO: NEGATIVE MG/DL
LEUKOCYTE ESTERASE UR QL STRIP.AUTO: NEGATIVE
NITRITE UR QL STRIP.AUTO: NEGATIVE
PH UR STRIP.AUTO: 7 [PH] (ref 5–8)
PROT UR QL STRIP: ABNORMAL MG/DL
RBC UR QL AUTO: NEGATIVE
SP GR UR STRIP.AUTO: 1.02 (ref 1–1.03)

## 2021-05-17 ENCOUNTER — APPOINTMENT (OUTPATIENT)
Dept: RADIOLOGY | Facility: MEDICAL CENTER | Age: 26
End: 2021-05-17
Attending: NURSE PRACTITIONER
Payer: COMMERCIAL

## 2021-05-17 ENCOUNTER — NURSE TRIAGE (OUTPATIENT)
Dept: HEALTH INFORMATION MANAGEMENT | Facility: OTHER | Age: 26
End: 2021-05-17

## 2021-05-17 ENCOUNTER — HOSPITAL ENCOUNTER (EMERGENCY)
Facility: MEDICAL CENTER | Age: 26
End: 2021-05-17
Attending: OBSTETRICS & GYNECOLOGY | Admitting: OBSTETRICS & GYNECOLOGY
Payer: COMMERCIAL

## 2021-05-17 VITALS — TEMPERATURE: 97 F | HEIGHT: 57 IN | RESPIRATION RATE: 16 BRPM | BODY MASS INDEX: 34.52 KG/M2 | WEIGHT: 160 LBS

## 2021-05-17 DIAGNOSIS — K80.20 CHOLELITHIASIS AFFECTING PREGNANCY, ANTEPARTUM: Primary | ICD-10-CM

## 2021-05-17 DIAGNOSIS — O26.619 CHOLELITHIASIS AFFECTING PREGNANCY, ANTEPARTUM: Primary | ICD-10-CM

## 2021-05-17 LAB
ALBUMIN SERPL BCP-MCNC: 3.4 G/DL (ref 3.2–4.9)
ALBUMIN/GLOB SERPL: 1.3 G/DL
ALP SERPL-CCNC: 74 U/L (ref 30–99)
ALT SERPL-CCNC: 79 U/L (ref 2–50)
AMYLASE SERPL-CCNC: 56 U/L (ref 20–103)
ANION GAP SERPL CALC-SCNC: 9 MMOL/L (ref 7–16)
APPEARANCE UR: CLEAR
AST SERPL-CCNC: 122 U/L (ref 12–45)
BASOPHILS # BLD AUTO: 0.3 % (ref 0–1.8)
BASOPHILS # BLD: 0.03 K/UL (ref 0–0.12)
BILIRUB SERPL-MCNC: 0.4 MG/DL (ref 0.1–1.5)
BUN SERPL-MCNC: 5 MG/DL (ref 8–22)
CALCIUM SERPL-MCNC: 8.8 MG/DL (ref 8.5–10.5)
CHLORIDE SERPL-SCNC: 107 MMOL/L (ref 96–112)
CO2 SERPL-SCNC: 23 MMOL/L (ref 20–33)
COLOR UR AUTO: YELLOW
CREAT SERPL-MCNC: 0.61 MG/DL (ref 0.5–1.4)
EOSINOPHIL # BLD AUTO: 0.08 K/UL (ref 0–0.51)
EOSINOPHIL NFR BLD: 0.7 % (ref 0–6.9)
ERYTHROCYTE [DISTWIDTH] IN BLOOD BY AUTOMATED COUNT: 50.3 FL (ref 35.9–50)
GLOBULIN SER CALC-MCNC: 2.7 G/DL (ref 1.9–3.5)
GLUCOSE SERPL-MCNC: 94 MG/DL (ref 65–99)
GLUCOSE UR QL STRIP.AUTO: NEGATIVE MG/DL
HCT VFR BLD AUTO: 35.9 % (ref 37–47)
HGB BLD-MCNC: 11.8 G/DL (ref 12–16)
IMM GRANULOCYTES # BLD AUTO: 0.1 K/UL (ref 0–0.11)
IMM GRANULOCYTES NFR BLD AUTO: 0.9 % (ref 0–0.9)
KETONES UR QL STRIP.AUTO: NEGATIVE MG/DL
LEUKOCYTE ESTERASE UR QL STRIP.AUTO: NEGATIVE
LIPASE SERPL-CCNC: 49 U/L (ref 11–82)
LYMPHOCYTES # BLD AUTO: 2 K/UL (ref 1–4.8)
LYMPHOCYTES NFR BLD: 17.3 % (ref 22–41)
MCH RBC QN AUTO: 32.9 PG (ref 27–33)
MCHC RBC AUTO-ENTMCNC: 32.9 G/DL (ref 33.6–35)
MCV RBC AUTO: 100 FL (ref 81.4–97.8)
MONOCYTES # BLD AUTO: 0.71 K/UL (ref 0–0.85)
MONOCYTES NFR BLD AUTO: 6.1 % (ref 0–13.4)
NEUTROPHILS # BLD AUTO: 8.64 K/UL (ref 2–7.15)
NEUTROPHILS NFR BLD: 74.7 % (ref 44–72)
NITRITE UR QL STRIP.AUTO: NEGATIVE
NRBC # BLD AUTO: 0 K/UL
NRBC BLD-RTO: 0 /100 WBC
PH UR STRIP.AUTO: 7 [PH] (ref 5–8)
PLATELET # BLD AUTO: 119 K/UL (ref 164–446)
PMV BLD AUTO: 13.5 FL (ref 9–12.9)
POTASSIUM SERPL-SCNC: 4 MMOL/L (ref 3.6–5.5)
PROT SERPL-MCNC: 6.1 G/DL (ref 6–8.2)
PROT UR QL STRIP: 30 MG/DL
RBC # BLD AUTO: 3.59 M/UL (ref 4.2–5.4)
RBC UR QL AUTO: NEGATIVE
SODIUM SERPL-SCNC: 139 MMOL/L (ref 135–145)
SP GR UR STRIP.AUTO: 1.02 (ref 1–1.03)
URATE SERPL-MCNC: 3.7 MG/DL (ref 1.9–8.2)
WBC # BLD AUTO: 11.6 K/UL (ref 4.8–10.8)

## 2021-05-17 PROCEDURE — A9270 NON-COVERED ITEM OR SERVICE: HCPCS | Performed by: NURSE PRACTITIONER

## 2021-05-17 PROCEDURE — 84550 ASSAY OF BLOOD/URIC ACID: CPT

## 2021-05-17 PROCEDURE — 99283 EMERGENCY DEPT VISIT LOW MDM: CPT

## 2021-05-17 PROCEDURE — 82150 ASSAY OF AMYLASE: CPT

## 2021-05-17 PROCEDURE — 700102 HCHG RX REV CODE 250 W/ 637 OVERRIDE(OP): Performed by: NURSE PRACTITIONER

## 2021-05-17 PROCEDURE — 80053 COMPREHEN METABOLIC PANEL: CPT

## 2021-05-17 PROCEDURE — 36415 COLL VENOUS BLD VENIPUNCTURE: CPT

## 2021-05-17 PROCEDURE — 85025 COMPLETE CBC W/AUTO DIFF WBC: CPT

## 2021-05-17 PROCEDURE — 83690 ASSAY OF LIPASE: CPT

## 2021-05-17 PROCEDURE — 76705 ECHO EXAM OF ABDOMEN: CPT

## 2021-05-17 PROCEDURE — 81002 URINALYSIS NONAUTO W/O SCOPE: CPT

## 2021-05-17 RX ADMIN — LIDOCAINE HYDROCHLORIDE 30 ML: 20 SOLUTION OROPHARYNGEAL at 21:06

## 2021-05-17 ASSESSMENT — FIBROSIS 4 INDEX: FIB4 SCORE: 4.14

## 2021-05-17 ASSESSMENT — PAIN SCALES - GENERAL: PAINLEVEL: 4

## 2021-05-17 NOTE — TELEPHONE ENCOUNTER
"Pt is reporting gallstone with nausea.  Pain started about an hour ago.  Instructed to follow up with OB via Mychart and go to ED if symptoms persist      Reason for Disposition  • Patient wants to be seen    Additional Information  • Negative: Passed out (i.e., fainted, collapsed and was not responding)  • Negative: Shock suspected (e.g., cold/pale/clammy skin, too weak to stand, low BP, rapid pulse)  • Negative: Sounds like a life-threatening emergency to the triager  • Negative: Chest pain  • Negative: Pain is mainly in upper abdomen (if needed ask: 'is it mainly above the belly button?')  • Negative: Abdominal pain and pregnant > 20 weeks  • Negative: Abdominal pain and pregnant < 20 weeks  • Negative: SEVERE abdominal pain (e.g., excruciating)  • Negative: Vomiting red blood or black (coffee ground) material  • Negative: Bloody, black, or tarry bowel movements  • Negative: Constant abdominal pain lasting > 2 hours  • Negative: Vomiting bile (green color)  • Negative: Patient sounds very sick or weak to the triager  • Negative: Vomiting and abdomen looks much more swollen than usual  • Negative: White of the eyes have turned yellow (i.e., jaundice)  • Negative: Blood in urine (red, pink, or tea-colored)  • Negative: Fever > 103 F (39.4 C)  • Negative: Fever > 101 F (38.3 C) and over 60 years of age  • Negative: Fever > 100.0 F (37.8 C) and has diabetes mellitus or a weak immune system (e.g., HIV positive, cancer chemotherapy, organ transplant, splenectomy, chronic steroids)  • Negative: Fever > 100.0 F (37.8 C) and bedridden (e.g., nursing home patient, stroke, chronic illness, recovering from surgery)  • Negative: Pregnant or could be pregnant (i.e., missed last menstrual period)  • Negative: Age > 60 years  • Negative: Unusual vaginal discharge  • Negative: MODERATE OR MILD pain that comes and goes (cramps) lasts > 24 hours    Answer Assessment - Initial Assessment Questions  1. LOCATION: \"Where does it hurt?\" " "      Upper right  2. RADIATION: \"Does the pain shoot anywhere else?\" (e.g., chest, back)      no  3. ONSET: \"When did the pain begin?\" (e.g., minutes, hours or days ago)       About an hour ago  4. SUDDEN: \"Gradual or sudden onset?\"      Suddenly after eating tacos  5. PATTERN \"Does the pain come and go, or is it constant?\"     - If constant: \"Is it getting better, staying the same, or worsening?\"       (Note: Constant means the pain never goes away completely; most serious pain is constant and it progresses)      - If intermittent: \"How long does it last?\" \"Do you have pain now?\"      (Note: Intermittent means the pain goes away completely between bouts)      When eating fatty foods the pain flares up  6. SEVERITY: \"How bad is the pain?\"  (e.g., Scale 1-10; mild, moderate, or severe)    - MILD (1-3): doesn't interfere with normal activities, abdomen soft and not tender to touch     - MODERATE (4-7): interferes with normal activities or awakens from sleep, tender to touch     - SEVERE (8-10): excruciating pain, doubled over, unable to do any normal activities       7/10  7. RECURRENT SYMPTOM: \"Have you ever had this type of abdominal pain before?\" If so, ask: \"When was the last time?\" and \"What happened that time?\"       Yes.  Pt was provided antiemetic  8. CAUSE: \"What do you think is causing the abdominal pain?\"      Gallbladder  9. RELIEVING/AGGRAVATING FACTORS: \"What makes it better or worse?\" (e.g., movement, antacids, bowel movement)      Fatty foods make it worse  10. OTHER SYMPTOMS: \"Has there been any vomiting, diarrhea, constipation, or urine problems?\"        no  11. PREGNANCY: \"Is there any chance you are pregnant?\" \"When was your last menstrual period?\"        Pt is 25 weeks pregnant.  August [birth year] EDWIGE    Protocols used: ABDOMINAL PAIN - FEMALE-A-OH      "

## 2021-05-18 NOTE — PROGRESS NOTES
Received report from WALTER Demarco RN. Pt states feeling better. Assumed care of pt.   FAMILIA Mohan CNM updated on lab results and pt stating feeling better. FAMILIA Mohan. SARAHI in to see pt. Orders to discharge home received. Pre term labor instructions and gall stone diet discussed .Pt verbalized an understanding. Pt discharged home.

## 2021-05-18 NOTE — ED PROVIDER NOTES
"S: Pt is a 26 y.o.  at 25w5d with Estimated Date of Delivery: 21 who presents to triage c/o RUQ pain after eating dinner tonight. She has known cholelithiasis, and ate tacos for dinner.  Denies VB, RUCs, LOF.  Reports good FM.      O: Temp 36.1 °C (97 °F) (Temporal)   Resp 16   Ht 1.448 m (4' 9\")   Wt 72.6 kg (160 lb)   LMP 2020 (Exact Date)   BMI 34.62 kg/m²          NST: Deferred due to early GA         Indication:  IUP       FHR: by doppler, 145 bpm         [place]nis: No UCs       SVE: deferred         A/P  Patient Active Problem List    Diagnosis Date Noted   • History of thrombocytopenia 2021   • History of PCOS 2021   • Anemia 10/16/2012     She was given a GI cocktail while in triage and is feeling much better.    CBC unchanged from previous draw. CMP with downward trending liver enzymes. Amylase and lipase WNL, Uric Acid WNL.  US done to rule out acute cholestasis, and results are unchanged from previous exam without evidence of obstructing stone, thickening of the wall, or increased dilation of bile duct.    Discussed diet with patient. If she continues to eat these kinds of foods, she will continue to have pain.  Discussed good balanced diet with decreased fats.    1.  IUP @ 25w5d  2.  No NST due to early GA.  3.  Labs/US unchanged from last draw 2021  4.  Referral to general surgery for OP consult  5.  F/u TPC at next scheduled appt.    Lab and US results reviewed with MD today, and agrees with OP expectant management    Delia Mohan CNM, APRN      "

## 2021-05-18 NOTE — PROGRESS NOTES
Pt is a ; EDWIGE of ; making her 25w5d. Pt here c/o right sided abdominal pain that was greater than 2 hours. Pt reports hx of gallstones. Was just recently diagnosed at 24w. Pt has no obstetrical complaints and +FM.     Pt reports eating tacos around noon and pain started around 3pm this afternoon. Pt reports Zofran has helped and pain has subsided since her arrival here at the hospital.     SOWMYA GIORDANOM updated. Orders for labs, RUQ US and GI cocktail (see mar). Update with results.     Report given RIKA Vásquez.

## 2021-06-08 ENCOUNTER — HOSPITAL ENCOUNTER (OUTPATIENT)
Dept: LAB | Facility: MEDICAL CENTER | Age: 26
End: 2021-06-08
Attending: ADVANCED PRACTICE MIDWIFE
Payer: COMMERCIAL

## 2021-06-08 DIAGNOSIS — Z34.82 ENCOUNTER FOR SUPERVISION OF OTHER NORMAL PREGNANCY IN SECOND TRIMESTER: ICD-10-CM

## 2021-06-08 LAB
BASOPHILS # BLD AUTO: 0.3 % (ref 0–1.8)
BASOPHILS # BLD: 0.03 K/UL (ref 0–0.12)
EOSINOPHIL # BLD AUTO: 0.15 K/UL (ref 0–0.51)
EOSINOPHIL NFR BLD: 1.5 % (ref 0–6.9)
ERYTHROCYTE [DISTWIDTH] IN BLOOD BY AUTOMATED COUNT: 50.8 FL (ref 35.9–50)
GLUCOSE 1H P 50 G GLC PO SERPL-MCNC: 93 MG/DL (ref 70–139)
HCT VFR BLD AUTO: 37.2 % (ref 37–47)
HGB BLD-MCNC: 12.4 G/DL (ref 12–16)
IMM GRANULOCYTES # BLD AUTO: 0.08 K/UL (ref 0–0.11)
IMM GRANULOCYTES NFR BLD AUTO: 0.8 % (ref 0–0.9)
LYMPHOCYTES # BLD AUTO: 2.21 K/UL (ref 1–4.8)
LYMPHOCYTES NFR BLD: 22.6 % (ref 22–41)
MCH RBC QN AUTO: 33.2 PG (ref 27–33)
MCHC RBC AUTO-ENTMCNC: 33.3 G/DL (ref 33.6–35)
MCV RBC AUTO: 99.5 FL (ref 81.4–97.8)
MONOCYTES # BLD AUTO: 0.61 K/UL (ref 0–0.85)
MONOCYTES NFR BLD AUTO: 6.2 % (ref 0–13.4)
NEUTROPHILS # BLD AUTO: 6.69 K/UL (ref 2–7.15)
NEUTROPHILS NFR BLD: 68.6 % (ref 44–72)
NRBC # BLD AUTO: 0 K/UL
NRBC BLD-RTO: 0 /100 WBC
PLATELET # BLD AUTO: 113 K/UL (ref 164–446)
PMV BLD AUTO: 13.7 FL (ref 9–12.9)
RBC # BLD AUTO: 3.74 M/UL (ref 4.2–5.4)
TREPONEMA PALLIDUM IGG+IGM AB [PRESENCE] IN SERUM OR PLASMA BY IMMUNOASSAY: NORMAL
WBC # BLD AUTO: 9.8 K/UL (ref 4.8–10.8)

## 2021-06-08 PROCEDURE — 85025 COMPLETE CBC W/AUTO DIFF WBC: CPT

## 2021-06-08 PROCEDURE — 82950 GLUCOSE TEST: CPT

## 2021-06-08 PROCEDURE — 36415 COLL VENOUS BLD VENIPUNCTURE: CPT

## 2021-06-08 PROCEDURE — 86780 TREPONEMA PALLIDUM: CPT

## 2021-06-09 ENCOUNTER — ROUTINE PRENATAL (OUTPATIENT)
Dept: OBGYN | Facility: CLINIC | Age: 26
End: 2021-06-09
Payer: COMMERCIAL

## 2021-06-09 VITALS — WEIGHT: 165 LBS | DIASTOLIC BLOOD PRESSURE: 52 MMHG | SYSTOLIC BLOOD PRESSURE: 93 MMHG | BODY MASS INDEX: 35.71 KG/M2

## 2021-06-09 DIAGNOSIS — Z34.83 ENCOUNTER FOR SUPERVISION OF OTHER NORMAL PREGNANCY, THIRD TRIMESTER: Primary | ICD-10-CM

## 2021-06-09 DIAGNOSIS — Z87.42 HISTORY OF PCOS: ICD-10-CM

## 2021-06-09 DIAGNOSIS — K80.20 CHOLELITHIASIS AFFECTING PREGNANCY, ANTEPARTUM: ICD-10-CM

## 2021-06-09 DIAGNOSIS — O26.619 CHOLELITHIASIS AFFECTING PREGNANCY, ANTEPARTUM: ICD-10-CM

## 2021-06-09 PROCEDURE — 90715 TDAP VACCINE 7 YRS/> IM: CPT | Performed by: ADVANCED PRACTICE MIDWIFE

## 2021-06-09 PROCEDURE — 90471 IMMUNIZATION ADMIN: CPT | Performed by: ADVANCED PRACTICE MIDWIFE

## 2021-06-09 PROCEDURE — 90040 PR PRENATAL FOLLOW UP: CPT | Performed by: ADVANCED PRACTICE MIDWIFE

## 2021-06-09 ASSESSMENT — FIBROSIS 4 INDEX: FIB4 SCORE: 3.04

## 2021-06-09 NOTE — PROGRESS NOTES
Pt here today for OB follow up  Pt states no complaints   Reports +FM  Good # [phone]  Pharmacy Confirmed.  Chaperone offered and not indicated.   1 hr GTT, WNL  Pt given PAOLA sheet and instructions  Pt declines BTL  Pt would like TDAP, consent signed

## 2021-06-09 NOTE — LETTER
"Count Your Baby's Movements  Another step to a healthy delivery    Nataliia Connelly              Dept: [phone]    How Many Weeks Pregnant? 29w0d    Date to Begin Countin21              How to use this chart    One way for your physician to keep track of your baby's health is by knowing how often the baby moves (or \"kicks\") in your womb.  You can help your physician to do this by using this chart every day.    Every day, you should see how many hours it takes for your baby to move 10 times.  Start in the morning, as soon as you get up.    · First, write down the time your baby moves until you get to 10.  · Check off one box every time your baby moves until you get to 10.  · Write down the time you finished counting in the last column.  · Total how long it took to count up all 10 movements.  · Finally, fill in the box that shows how long this took.  After counting 10 movements, you no longer have to count any more that day.  The next morning, just start counting again as soon as you get up.    What should you call a \"movement\"?  It is hard to say, because it will feel different from one mother to another and from one pregnancy to the next.  The important thing is that you count the movements the same way throughout your pregnancy.  If you have more questions, you should ask your physician.    Count carefully every day!  SAMPLE:  Week 28    How many hours did it take to feel 10 movements?       Start  Time     1     2     3     4     5     6     7     8     9     10   Finish Time   Mon 8:20 ·  ·  ·  ·  ·  ·  ·  ·  ·  ·  11:40                  Sat               Sun                 IMPORTANT: You should contact your physician if it takes more than two hours for you to feel 10 movements.  Each morning, write down the time and start to count the movements of your baby.  Keep track by checking off one box every time you feel one movement.  When you have " "felt 10 \"kicks\", write down the time you finished counting in the last column.  Then fill in the   box (over the check melchor) for the number of hours it took.  Be sure to read the complete instructions on the previous page.            "

## 2021-06-12 ENCOUNTER — HOSPITAL ENCOUNTER (OUTPATIENT)
Dept: RADIOLOGY | Facility: MEDICAL CENTER | Age: 26
End: 2021-06-12
Attending: ADVANCED PRACTICE MIDWIFE
Payer: COMMERCIAL

## 2021-06-12 DIAGNOSIS — K80.20 CHOLELITHIASIS AFFECTING PREGNANCY, ANTEPARTUM: ICD-10-CM

## 2021-06-12 DIAGNOSIS — O26.619 CHOLELITHIASIS AFFECTING PREGNANCY, ANTEPARTUM: ICD-10-CM

## 2021-06-12 DIAGNOSIS — Z87.42 HISTORY OF PCOS: ICD-10-CM

## 2021-06-12 PROCEDURE — 76816 OB US FOLLOW-UP PER FETUS: CPT

## 2021-06-17 ENCOUNTER — HOSPITAL ENCOUNTER (EMERGENCY)
Facility: MEDICAL CENTER | Age: 26
End: 2021-06-17
Attending: OBSTETRICS & GYNECOLOGY | Admitting: OBSTETRICS & GYNECOLOGY
Payer: COMMERCIAL

## 2021-06-17 ENCOUNTER — APPOINTMENT (OUTPATIENT)
Dept: RADIOLOGY | Facility: MEDICAL CENTER | Age: 26
End: 2021-06-17
Attending: OBSTETRICS & GYNECOLOGY
Payer: COMMERCIAL

## 2021-06-17 VITALS
TEMPERATURE: 97.7 F | HEIGHT: 57 IN | RESPIRATION RATE: 17 BRPM | DIASTOLIC BLOOD PRESSURE: 57 MMHG | BODY MASS INDEX: 35.6 KG/M2 | HEART RATE: 85 BPM | SYSTOLIC BLOOD PRESSURE: 107 MMHG | OXYGEN SATURATION: 98 % | WEIGHT: 165 LBS

## 2021-06-17 LAB
ALBUMIN SERPL BCP-MCNC: 3.3 G/DL (ref 3.2–4.9)
ALBUMIN/GLOB SERPL: 1.1 G/DL
ALP SERPL-CCNC: 81 U/L (ref 30–99)
ALT SERPL-CCNC: 46 U/L (ref 2–50)
AMYLASE SERPL-CCNC: 117 U/L (ref 20–103)
ANION GAP SERPL CALC-SCNC: 10 MMOL/L (ref 7–16)
AST SERPL-CCNC: 74 U/L (ref 12–45)
BASOPHILS # BLD AUTO: 0.2 % (ref 0–1.8)
BASOPHILS # BLD: 0.03 K/UL (ref 0–0.12)
BILIRUB SERPL-MCNC: 0.7 MG/DL (ref 0.1–1.5)
BUN SERPL-MCNC: 6 MG/DL (ref 8–22)
CALCIUM SERPL-MCNC: 9 MG/DL (ref 8.5–10.5)
CHLORIDE SERPL-SCNC: 106 MMOL/L (ref 96–112)
CO2 SERPL-SCNC: 22 MMOL/L (ref 20–33)
CREAT SERPL-MCNC: 0.52 MG/DL (ref 0.5–1.4)
EOSINOPHIL # BLD AUTO: 0.06 K/UL (ref 0–0.51)
EOSINOPHIL NFR BLD: 0.5 % (ref 0–6.9)
ERYTHROCYTE [DISTWIDTH] IN BLOOD BY AUTOMATED COUNT: 49.7 FL (ref 35.9–50)
GLOBULIN SER CALC-MCNC: 2.9 G/DL (ref 1.9–3.5)
GLUCOSE SERPL-MCNC: 111 MG/DL (ref 65–99)
HCT VFR BLD AUTO: 34.9 % (ref 37–47)
HGB BLD-MCNC: 11.7 G/DL (ref 12–16)
IMM GRANULOCYTES # BLD AUTO: 0.1 K/UL (ref 0–0.11)
IMM GRANULOCYTES NFR BLD AUTO: 0.8 % (ref 0–0.9)
LIPASE SERPL-CCNC: 232 U/L (ref 11–82)
LYMPHOCYTES # BLD AUTO: 1.85 K/UL (ref 1–4.8)
LYMPHOCYTES NFR BLD: 14.2 % (ref 22–41)
MCH RBC QN AUTO: 32.7 PG (ref 27–33)
MCHC RBC AUTO-ENTMCNC: 33.5 G/DL (ref 33.6–35)
MCV RBC AUTO: 97.5 FL (ref 81.4–97.8)
MONOCYTES # BLD AUTO: 0.61 K/UL (ref 0–0.85)
MONOCYTES NFR BLD AUTO: 4.7 % (ref 0–13.4)
NEUTROPHILS # BLD AUTO: 10.34 K/UL (ref 2–7.15)
NEUTROPHILS NFR BLD: 79.6 % (ref 44–72)
NRBC # BLD AUTO: 0 K/UL
NRBC BLD-RTO: 0 /100 WBC
PLATELET # BLD AUTO: 109 K/UL (ref 164–446)
PMV BLD AUTO: 13.6 FL (ref 9–12.9)
POTASSIUM SERPL-SCNC: 3.8 MMOL/L (ref 3.6–5.5)
PROT SERPL-MCNC: 6.2 G/DL (ref 6–8.2)
RBC # BLD AUTO: 3.58 M/UL (ref 4.2–5.4)
SODIUM SERPL-SCNC: 138 MMOL/L (ref 135–145)
WBC # BLD AUTO: 13 K/UL (ref 4.8–10.8)

## 2021-06-17 PROCEDURE — 80053 COMPREHEN METABOLIC PANEL: CPT

## 2021-06-17 PROCEDURE — 99284 EMERGENCY DEPT VISIT MOD MDM: CPT

## 2021-06-17 PROCEDURE — 700102 HCHG RX REV CODE 250 W/ 637 OVERRIDE(OP): Performed by: NURSE PRACTITIONER

## 2021-06-17 PROCEDURE — 59025 FETAL NON-STRESS TEST: CPT

## 2021-06-17 PROCEDURE — A9270 NON-COVERED ITEM OR SERVICE: HCPCS | Performed by: NURSE PRACTITIONER

## 2021-06-17 PROCEDURE — 76705 ECHO EXAM OF ABDOMEN: CPT

## 2021-06-17 PROCEDURE — 82150 ASSAY OF AMYLASE: CPT

## 2021-06-17 PROCEDURE — 83690 ASSAY OF LIPASE: CPT

## 2021-06-17 PROCEDURE — 36415 COLL VENOUS BLD VENIPUNCTURE: CPT

## 2021-06-17 PROCEDURE — 85025 COMPLETE CBC W/AUTO DIFF WBC: CPT

## 2021-06-17 RX ADMIN — LIDOCAINE HYDROCHLORIDE 30 ML: 20 SOLUTION OROPHARYNGEAL at 04:41

## 2021-06-17 ASSESSMENT — PAIN SCALES - GENERAL: PAINLEVEL: 8

## 2021-06-17 ASSESSMENT — FIBROSIS 4 INDEX: FIB4 SCORE: 3.04

## 2021-06-17 NOTE — PROGRESS NOTES
0400- Assumed care of pt, POC discussed, pt verbalized understanding.     0432- Report called to MAHESH Sandoval CNM. GI cocktail ordered.     0522- MAHESH Sandoval CNM at bedside to evaluate pt, lab orders received and ordered per protocol.     0700- SBAR given to TERELL Dailey

## 2021-06-17 NOTE — ED PROVIDER NOTES
"Emergency Obstetric Consultation     Date of Service  2021      PATIENT ID:  NAME:  Nataliia Falk  MRN:               [id number]  YOB: 1995     26 y.o. female  at 30w1d.    Subjective: Pt reports epigastric pain 7/10, since 10 pm following dinner of grilled chicken and spicy potato chips  Pregnancy complicated by cholelithiasis and thrombocytopenia  negative  For CTXS.   positive Feels pain   negative for LOF  negative for vaginal bleeding.   positive for fetal movement    ROS: Patient denies any fever chills, nausea, vomiting, headache, chest pain, shortness of breath, or dysuria or unusual swelling of hands or feet.     Objective:    Vitals:    21 0346   BP: 107/57   Pulse: 85   Resp: 17   Temp: 36.5 °C (97.7 °F)   TempSrc: Temporal   SpO2: 98%   Weight: 74.8 kg (165 lb)   Height: 1.448 m (4' 9\")     Temp (24hrs), Av.5 °C (97.7 °F), Min:36.5 °C (97.7 °F), Max:36.5 °C (97.7 °F)    General: No acute distress, resting comfortably in bed.  HEENT: normocephalic, nontraumatic, PERRLA, EOMI  Cardiovascular: Heart RRR with no murmurs, rubs or gallops. Distal Pulses 2+  Respiratory: symmetric chest expansion, lungs CTAB, with no wheezes, rales, rhonci  Abdomen: gravid,tender epigastric  Musculoskeletal: strength 5/5 in four extremities  Neuro: non focal with no numbness, tingling or changes in sensation    Cervix:  deferred  [place]: Uterine Contractions none   FHRM: Baseline 130, Accels to 150, no decels, moderate variability    NST reactive per my read    Results for NATALIIA SINGH (MRN [id number]) as of 2021 07:19   Ref. Range 2021 05:55 2021 06:48   WBC Latest Ref Range: 4.8 - 10.8 K/uL 13.0 (H)    RBC Latest Ref Range: 4.20 - 5.40 M/uL 3.58 (L)    Hemoglobin Latest Ref Range: 12.0 - 16.0 g/dL 11.7 (L)    Hematocrit Latest Ref Range: 37.0 - 47.0 % 34.9 (L)    MCV Latest Ref Range: 81.4 - 97.8 fL 97.5    MCH Latest Ref Range: 27.0 - 33.0 pg 32.7    MCHC " Latest Ref Range: 33.6 - 35.0 g/dL 33.5 (L)    RDW Latest Ref Range: 35.9 - 50.0 fL 49.7    Platelet Count Latest Ref Range: 164 - 446 K/uL 109 (L)    MPV Latest Ref Range: 9.0 - 12.9 fL 13.6 (H)    Neutrophils-Polys Latest Ref Range: 44.00 - 72.00 % 79.60 (H)    Neutrophils (Absolute) Latest Ref Range: 2.00 - 7.15 K/uL 10.34 (H)    Lymphocytes Latest Ref Range: 22.00 - 41.00 % 14.20 (L)    Lymphs (Absolute) Latest Ref Range: 1.00 - 4.80 K/uL 1.85    Monocytes Latest Ref Range: 0.00 - 13.40 % 4.70    Monos (Absolute) Latest Ref Range: 0.00 - 0.85 K/uL 0.61    Eosinophils Latest Ref Range: 0.00 - 6.90 % 0.50    Eos (Absolute) Latest Ref Range: 0.00 - 0.51 K/uL 0.06    Basophils Latest Ref Range: 0.00 - 1.80 % 0.20    Baso (Absolute) Latest Ref Range: 0.00 - 0.12 K/uL 0.03    Immature Granulocytes Latest Ref Range: 0.00 - 0.90 % 0.80    Immature Granulocytes (abs) Latest Ref Range: 0.00 - 0.11 K/uL 0.10    Nucleated RBC Latest Units: /100 WBC 0.00    NRBC (Absolute) Latest Units: K/uL 0.00    Sodium Latest Ref Range: 135 - 145 mmol/L 138    Potassium Latest Ref Range: 3.6 - 5.5 mmol/L 3.8    Chloride Latest Ref Range: 96 - 112 mmol/L 106    Co2 Latest Ref Range: 20 - 33 mmol/L 22    Anion Gap Latest Ref Range: 7.0 - 16.0  10.0    Glucose Latest Ref Range: 65 - 99 mg/dL 111 (H)    Bun Latest Ref Range: 8 - 22 mg/dL 6 (L)    Creatinine Latest Ref Range: 0.50 - 1.40 mg/dL 0.52    GFR If  Latest Ref Range: >60 mL/min/1.73 m 2 >60    GFR If Non  Latest Ref Range: >60 mL/min/1.73 m 2 >60    Calcium Latest Ref Range: 8.5 - 10.5 mg/dL 9.0    AST(SGOT) Latest Ref Range: 12 - 45 U/L 74 (H)    ALT(SGPT) Latest Ref Range: 2 - 50 U/L 46    Alkaline Phosphatase Latest Ref Range: 30 - 99 U/L 81    Total Bilirubin Latest Ref Range: 0.1 - 1.5 mg/dL 0.7    Albumin Latest Ref Range: 3.2 - 4.9 g/dL 3.3    Total Protein Latest Ref Range: 6.0 - 8.2 g/dL 6.2    Globulin Latest Ref Range: 1.9 - 3.5 g/dL 2.9     A-G Ratio Latest Units: g/dL 1.1    Lipase Latest Ref Range: 11 - 82 U/L 232 (H)    Amylase Latest Ref Range: 20 - 103 U/L 117 (H)    -[facility] Unknown  Rpt       Assessment: 26 y.o. female  at 30w1d.      Plan:     -[facility]    GUNANR [place].

## 2021-06-17 NOTE — PROGRESS NOTES
Report received from NOC RN, POC discussed, assumed pt care.     US performed. Report and labs reviewed by MD.   Dr. Coats at bedside.     Dr. Jimenez reviewed case. Discharge order received.   Pt to follow BRAT diet and return to the hospital if pain returns or worsens.   Discharge instructions given. Pt verbalized understanding.   Discharged to home, ambulatory.

## 2021-06-17 NOTE — ED PROVIDER NOTES
ED provider note:    Assumed care of patient this morning under supervision of Dr. Jimenez. Awaiting results from RUQ ultrasound.     S: Patient notes that GI cocktail resolved pain initially but that for the past 1 hour the pain had returned. Notes that it is located in RUQ abdomen with radiation to her back. Denied fever, chills. She had a few episodes of emesis prior to coming to the hospital.     No data found.  Gen- resting in bed, NAD  Heart- RRR, no murmur  Lungs- CTAB, no wheezing, rales, rhonchi  Abd- soft, gravid, RUQ tenderness, negative Ramirez sign  Ext- symmetric, no edema    FHT: 130/mod variability/+accels/- decels  toco: no ctx    US-RUQ   Final Result         1.  Echogenic liver compatible with fatty change versus fibrosis.   2.  Cholelithiasis without additional sonographic findings of acute cholecystitis.              A/P: 26 y.o.  @ 30w1d with pregnancy complicated by thrombocytopenia and cholelithiasis.     # RUQ abdominal pain  Patient with cholelithiasis throughout pregnancy. She has been having abdominal pain since this morning after eating chicken and spicy potato chips last night. Patient afebrile. Workup completed in triage significant for leukocytosis, elevated AST, lipase and amylase. RUQ ultrasound completed and unremarkable. This is likely secondary to cholelithiasis. Discussed pain control with Tylenol and bland diet. Patient discharged home in stable condition. Return precautions discussed.    Dispo- discharge home

## 2021-06-17 NOTE — DISCHARGE INSTRUCTIONS
"Cholelithiasis    Cholelithiasis is also called \"gallstones.\" It is a kind of gallbladder disease. The gallbladder is an organ that stores a liquid (bile) that helps you digest fat. Gallstones may not cause symptoms (may be silent gallstones) until they cause a blockage, and then they can cause pain (gallbladder attack).  Follow these instructions at home:  · Take over-the-counter and prescription medicines only as told by your doctor.  · Stay at a healthy weight.  · Eat healthy foods. This includes:  ? Eating fewer fatty foods, like fried foods.  ? Eating fewer refined carbs (refined carbohydrates). Refined carbs are breads and grains that are highly processed, like white bread and white rice. Instead, choose whole grains like whole-wheat bread and brown rice.  ? Eating more fiber. Almonds, fresh fruit, and beans are healthy sources of fiber.  · Keep all follow-up visits as told by your doctor. This is important.  Contact a doctor if:  · You have sudden pain in the upper right side of your belly (abdomen). Pain might spread to your right shoulder or your chest. This may be a sign of a gallbladder attack.  · You feel sick to your stomach (are nauseous).  · You throw up (vomit).  · You have been diagnosed with gallstones that have no symptoms and you get:  ? Belly pain.  ? Discomfort, burning, or fullness in the upper part of your belly (indigestion).  Get help right away if:  · You have sudden pain in the upper right side of your belly, and it lasts for more than 2 hours.  · You have belly pain that lasts for more than 5 hours.  · You have a fever or chills.  · You keep feeling sick to your stomach or you keep throwing up.  · Your skin or the whites of your eyes turn yellow (jaundice).  · You have dark-colored pee (urine).  · You have light-colored poop (stool).  Summary  · Cholelithiasis is also called \"gallstones.\"  · The gallbladder is an organ that stores a liquid (bile) that helps you digest fat.  · Silent " gallstones are gallstones that do not cause symptoms.  · A gallbladder attack may cause sudden pain in the upper right side of your belly. Pain might spread to your right shoulder or your chest. If this happens, contact your doctor.  · If you have sudden pain in the upper right side of your belly that lasts for more than 2 hours, get help right away.  This information is not intended to replace advice given to you by your health care provider. Make sure you discuss any questions you have with your health care provider.  Document Released: 06/05/2009 Document Revised: 11/30/2018 Document Reviewed: 09/03/2017  Elsevier Patient Education © 2020 [employer]

## 2021-06-17 NOTE — PROGRESS NOTES
0343-Pt presents to L&D c/o upper abdominal pain due to a gallstone attack since 10 pm, reports pain 7/10. Denies UC's, LOF or VB and confirms +FM. TOCO and EFM applied. Reports eating chicken and pasta around 7 pm. Reports sexual intercourse yesterday. VS taken. POC discussed  0400-Report given to Mariaelena FIGUEREDO

## 2021-06-22 ENCOUNTER — ROUTINE PRENATAL (OUTPATIENT)
Dept: OBGYN | Facility: CLINIC | Age: 26
End: 2021-06-22
Payer: COMMERCIAL

## 2021-06-22 VITALS — BODY MASS INDEX: 36.57 KG/M2 | WEIGHT: 169 LBS | SYSTOLIC BLOOD PRESSURE: 96 MMHG | DIASTOLIC BLOOD PRESSURE: 55 MMHG

## 2021-06-22 DIAGNOSIS — K80.00 GALLSTONES AND INFLAMMATION OF GALLBLADDER WITHOUT OBSTRUCTION: ICD-10-CM

## 2021-06-22 PROCEDURE — 90040 PR PRENATAL FOLLOW UP: CPT | Performed by: OBSTETRICS & GYNECOLOGY

## 2021-06-22 ASSESSMENT — FIBROSIS 4 INDEX: FIB4 SCORE: 2.5

## 2021-08-03 ENCOUNTER — HOSPITAL ENCOUNTER (OUTPATIENT)
Facility: MEDICAL CENTER | Age: 26
End: 2021-08-03
Attending: OBSTETRICS & GYNECOLOGY
Payer: COMMERCIAL

## 2021-08-03 ENCOUNTER — ROUTINE PRENATAL (OUTPATIENT)
Dept: OBGYN | Facility: CLINIC | Age: 26
End: 2021-08-03
Payer: COMMERCIAL

## 2021-08-03 ENCOUNTER — TELEPHONE (OUTPATIENT)
Dept: OBGYN | Facility: CLINIC | Age: 26
End: 2021-08-03

## 2021-08-03 VITALS — BODY MASS INDEX: 36.14 KG/M2 | DIASTOLIC BLOOD PRESSURE: 50 MMHG | WEIGHT: 167 LBS | SYSTOLIC BLOOD PRESSURE: 100 MMHG

## 2021-08-03 DIAGNOSIS — Z34.83 ENCOUNTER FOR SUPERVISION OF OTHER NORMAL PREGNANCY, THIRD TRIMESTER: ICD-10-CM

## 2021-08-03 PROCEDURE — 87150 DNA/RNA AMPLIFIED PROBE: CPT

## 2021-08-03 PROCEDURE — 87081 CULTURE SCREEN ONLY: CPT

## 2021-08-03 PROCEDURE — 90040 PR PRENATAL FOLLOW UP: CPT | Performed by: OBSTETRICS & GYNECOLOGY

## 2021-08-03 ASSESSMENT — FIBROSIS 4 INDEX: FIB4 SCORE: 2.5

## 2021-08-03 NOTE — TELEPHONE ENCOUNTER
Patient called today stating that she forgot to give us her [facility] paperwork. Asked if she could get our fax number to send it to us. Gave patient the fax number [phone]. Have not yet received [facility] paperwork.

## 2021-08-04 LAB — GP B STREP DNA SPEC QL NAA+PROBE: POSITIVE

## 2021-08-10 ENCOUNTER — ROUTINE PRENATAL (OUTPATIENT)
Dept: OBGYN | Facility: CLINIC | Age: 26
End: 2021-08-10
Payer: COMMERCIAL

## 2021-08-10 VITALS — BODY MASS INDEX: 36.79 KG/M2 | SYSTOLIC BLOOD PRESSURE: 107 MMHG | WEIGHT: 170 LBS | DIASTOLIC BLOOD PRESSURE: 60 MMHG

## 2021-08-10 DIAGNOSIS — Z34.90 PREGNANCY, UNSPECIFIED GESTATIONAL AGE: ICD-10-CM

## 2021-08-10 PROCEDURE — 90040 PR PRENATAL FOLLOW UP: CPT | Performed by: OBSTETRICS & GYNECOLOGY

## 2021-08-10 ASSESSMENT — FIBROSIS 4 INDEX: FIB4 SCORE: 2.5

## 2021-08-17 ENCOUNTER — ROUTINE PRENATAL (OUTPATIENT)
Dept: OBGYN | Facility: CLINIC | Age: 26
End: 2021-08-17
Payer: COMMERCIAL

## 2021-08-17 VITALS — BODY MASS INDEX: 37 KG/M2 | DIASTOLIC BLOOD PRESSURE: 67 MMHG | SYSTOLIC BLOOD PRESSURE: 109 MMHG | WEIGHT: 171 LBS

## 2021-08-17 DIAGNOSIS — Z34.90 PREGNANCY, UNSPECIFIED GESTATIONAL AGE: ICD-10-CM

## 2021-08-17 PROCEDURE — 90040 PR PRENATAL FOLLOW UP: CPT | Performed by: OBSTETRICS & GYNECOLOGY

## 2021-08-17 ASSESSMENT — FIBROSIS 4 INDEX: FIB4 SCORE: 2.5

## 2021-08-23 ENCOUNTER — HOSPITAL ENCOUNTER (OUTPATIENT)
Dept: OBGYN | Facility: MEDICAL CENTER | Age: 26
End: 2021-08-23
Attending: OBSTETRICS & GYNECOLOGY
Payer: COMMERCIAL

## 2021-08-23 PROCEDURE — U0003 INFECTIOUS AGENT DETECTION BY NUCLEIC ACID (DNA OR RNA); SEVERE ACUTE RESPIRATORY SYNDROME CORONAVIRUS 2 (SARS-COV-2) (CORONAVIRUS DISEASE [COVID-19]), AMPLIFIED PROBE TECHNIQUE, MAKING USE OF HIGH THROUGHPUT TECHNOLOGIES AS DESCRIBED BY CMS-2020-01-R: HCPCS

## 2021-08-23 PROCEDURE — U0005 INFEC AGEN DETEC AMPLI PROBE: HCPCS

## 2021-08-24 ENCOUNTER — ROUTINE PRENATAL (OUTPATIENT)
Dept: OBGYN | Facility: CLINIC | Age: 26
End: 2021-08-24
Payer: COMMERCIAL

## 2021-08-24 VITALS — BODY MASS INDEX: 37.22 KG/M2 | WEIGHT: 172 LBS | DIASTOLIC BLOOD PRESSURE: 62 MMHG | SYSTOLIC BLOOD PRESSURE: 103 MMHG

## 2021-08-24 DIAGNOSIS — Z34.80 SUPERVISION OF OTHER NORMAL PREGNANCY, ANTEPARTUM: ICD-10-CM

## 2021-08-24 LAB
SARS-COV-2 RNA RESP QL NAA+PROBE: NOTDETECTED
SPECIMEN SOURCE: NORMAL

## 2021-08-24 PROCEDURE — 90040 PR PRENATAL FOLLOW UP: CPT | Performed by: OBSTETRICS & GYNECOLOGY

## 2021-08-24 ASSESSMENT — FIBROSIS 4 INDEX: FIB4 SCORE: 2.5

## 2021-08-24 NOTE — PROGRESS NOTES
1745 - Pt here for pre admit covid swab. Swab obtained. Pt educated on self isolation at home until IOL on 8/26.

## 2021-08-26 ENCOUNTER — HOSPITAL ENCOUNTER (OUTPATIENT)
Facility: MEDICAL CENTER | Age: 26
End: 2021-08-26
Attending: OBSTETRICS & GYNECOLOGY | Admitting: OBSTETRICS & GYNECOLOGY
Payer: COMMERCIAL

## 2021-08-26 VITALS
TEMPERATURE: 97.9 F | SYSTOLIC BLOOD PRESSURE: 110 MMHG | DIASTOLIC BLOOD PRESSURE: 68 MMHG | OXYGEN SATURATION: 98 % | RESPIRATION RATE: 18 BRPM | HEART RATE: 93 BPM | WEIGHT: 172 LBS | BODY MASS INDEX: 37.11 KG/M2 | HEIGHT: 57 IN

## 2021-08-26 PROCEDURE — 59025 FETAL NON-STRESS TEST: CPT

## 2021-08-26 ASSESSMENT — FIBROSIS 4 INDEX: FIB4 SCORE: 2.5

## 2021-08-27 ENCOUNTER — APPOINTMENT (OUTPATIENT)
Dept: OBGYN | Facility: MEDICAL CENTER | Age: 26
End: 2021-08-27
Attending: OBSTETRICS & GYNECOLOGY
Payer: COMMERCIAL

## 2021-08-27 ENCOUNTER — HOSPITAL ENCOUNTER (INPATIENT)
Facility: MEDICAL CENTER | Age: 26
LOS: 3 days | End: 2021-08-30
Attending: OBSTETRICS & GYNECOLOGY | Admitting: OBSTETRICS & GYNECOLOGY
Payer: COMMERCIAL

## 2021-08-27 PROCEDURE — 3E0P7VZ INTRODUCTION OF HORMONE INTO FEMALE REPRODUCTIVE, VIA NATURAL OR ARTIFICIAL OPENING: ICD-10-PCS | Performed by: OBSTETRICS & GYNECOLOGY

## 2021-08-27 PROCEDURE — 770002 HCHG ROOM/CARE - OB PRIVATE (112)

## 2021-08-27 RX ORDER — TERBUTALINE SULFATE 1 MG/ML
0.25 INJECTION, SOLUTION SUBCUTANEOUS PRN
Status: DISCONTINUED | OUTPATIENT
Start: 2021-08-27 | End: 2021-08-29 | Stop reason: HOSPADM

## 2021-08-27 RX ORDER — ALUMINA, MAGNESIA, AND SIMETHICONE 2400; 2400; 240 MG/30ML; MG/30ML; MG/30ML
30 SUSPENSION ORAL EVERY 6 HOURS PRN
Status: DISCONTINUED | OUTPATIENT
Start: 2021-08-27 | End: 2021-08-29 | Stop reason: HOSPADM

## 2021-08-27 RX ORDER — HYDROXYZINE 50 MG/1
50 TABLET, FILM COATED ORAL EVERY 6 HOURS PRN
Status: DISCONTINUED | OUTPATIENT
Start: 2021-08-27 | End: 2021-08-29 | Stop reason: HOSPADM

## 2021-08-27 RX ORDER — ONDANSETRON 4 MG/1
4 TABLET, ORALLY DISINTEGRATING ORAL EVERY 6 HOURS PRN
Status: CANCELLED | OUTPATIENT
Start: 2021-08-27

## 2021-08-27 RX ORDER — ONDANSETRON 2 MG/ML
4 INJECTION INTRAMUSCULAR; INTRAVENOUS EVERY 6 HOURS PRN
Status: CANCELLED | OUTPATIENT
Start: 2021-08-27

## 2021-08-27 RX ORDER — METOCLOPRAMIDE HYDROCHLORIDE 5 MG/ML
10 INJECTION INTRAMUSCULAR; INTRAVENOUS EVERY 6 HOURS PRN
Status: CANCELLED | OUTPATIENT
Start: 2021-08-27

## 2021-08-27 RX ORDER — CITRIC ACID/SODIUM CITRATE 334-500MG
30 SOLUTION, ORAL ORAL EVERY 6 HOURS PRN
Status: DISCONTINUED | OUTPATIENT
Start: 2021-08-27 | End: 2021-08-29 | Stop reason: HOSPADM

## 2021-08-27 RX ORDER — MISOPROSTOL 200 UG/1
800 TABLET ORAL
Status: DISCONTINUED | OUTPATIENT
Start: 2021-08-27 | End: 2021-08-29 | Stop reason: HOSPADM

## 2021-08-27 RX ORDER — SODIUM CHLORIDE, SODIUM LACTATE, POTASSIUM CHLORIDE, CALCIUM CHLORIDE 600; 310; 30; 20 MG/100ML; MG/100ML; MG/100ML; MG/100ML
INJECTION, SOLUTION INTRAVENOUS CONTINUOUS
Status: ACTIVE | OUTPATIENT
Start: 2021-08-28 | End: 2021-08-28

## 2021-08-27 ASSESSMENT — FIBROSIS 4 INDEX: FIB4 SCORE: 2.5

## 2021-08-27 NOTE — PROGRESS NOTES
2202-  here with EDC of  = 40.1 weeks for scheduled OP gel. Reports positive FM, denies UC's, LOF or VB. Taken to LDA5 accompanied by FOB, instructed to change and call out when ready.    - POC discussed, questions answered. VS taken, monitors applied x2.   0 - SVE /3.    - report given to FAMILIA Catherine CNM; discharge order received.    - discharge instructions given, all questions answerewd.    - discharged home with FOB in stable walking condition.

## 2021-08-28 ENCOUNTER — ANESTHESIA (OUTPATIENT)
Dept: ANESTHESIOLOGY | Facility: MEDICAL CENTER | Age: 26
End: 2021-08-28
Payer: COMMERCIAL

## 2021-08-28 ENCOUNTER — ANESTHESIA EVENT (OUTPATIENT)
Dept: ANESTHESIOLOGY | Facility: MEDICAL CENTER | Age: 26
End: 2021-08-28
Payer: COMMERCIAL

## 2021-08-28 LAB
BASOPHILS # BLD AUTO: 0.1 % (ref 0–1.8)
BASOPHILS # BLD: 0.02 K/UL (ref 0–0.12)
EOSINOPHIL # BLD AUTO: 0.05 K/UL (ref 0–0.51)
EOSINOPHIL NFR BLD: 0.3 % (ref 0–6.9)
ERYTHROCYTE [DISTWIDTH] IN BLOOD BY AUTOMATED COUNT: 49.3 FL (ref 35.9–50)
HCT VFR BLD AUTO: 37.5 % (ref 37–47)
HGB BLD-MCNC: 12.5 G/DL (ref 12–16)
HOLDING TUBE BB 8507: NORMAL
IMM GRANULOCYTES # BLD AUTO: 0.08 K/UL (ref 0–0.11)
IMM GRANULOCYTES NFR BLD AUTO: 0.5 % (ref 0–0.9)
LYMPHOCYTES # BLD AUTO: 2.2 K/UL (ref 1–4.8)
LYMPHOCYTES NFR BLD: 14.4 % (ref 22–41)
MCH RBC QN AUTO: 32.3 PG (ref 27–33)
MCHC RBC AUTO-ENTMCNC: 33.3 G/DL (ref 33.6–35)
MCV RBC AUTO: 96.9 FL (ref 81.4–97.8)
MONOCYTES # BLD AUTO: 0.76 K/UL (ref 0–0.85)
MONOCYTES NFR BLD AUTO: 5 % (ref 0–13.4)
NEUTROPHILS # BLD AUTO: 12.17 K/UL (ref 2–7.15)
NEUTROPHILS NFR BLD: 79.7 % (ref 44–72)
NRBC # BLD AUTO: 0 K/UL
NRBC BLD-RTO: 0 /100 WBC
PLATELET # BLD AUTO: 98 K/UL (ref 164–446)
PMV BLD AUTO: 13.8 FL (ref 9–12.9)
RBC # BLD AUTO: 3.87 M/UL (ref 4.2–5.4)
WBC # BLD AUTO: 15.3 K/UL (ref 4.8–10.8)

## 2021-08-28 PROCEDURE — 99999 PR NO CHARGE: CPT | Performed by: OBSTETRICS & GYNECOLOGY

## 2021-08-28 PROCEDURE — 304965 HCHG RECOVERY SERVICES

## 2021-08-28 PROCEDURE — 10907ZC DRAINAGE OF AMNIOTIC FLUID, THERAPEUTIC FROM PRODUCTS OF CONCEPTION, VIA NATURAL OR ARTIFICIAL OPENING: ICD-10-PCS | Performed by: OBSTETRICS & GYNECOLOGY

## 2021-08-28 PROCEDURE — 700101 HCHG RX REV CODE 250: Performed by: ANESTHESIOLOGY

## 2021-08-28 PROCEDURE — 36415 COLL VENOUS BLD VENIPUNCTURE: CPT

## 2021-08-28 PROCEDURE — 3E033VJ INTRODUCTION OF OTHER HORMONE INTO PERIPHERAL VEIN, PERCUTANEOUS APPROACH: ICD-10-PCS | Performed by: OBSTETRICS & GYNECOLOGY

## 2021-08-28 PROCEDURE — 303615 HCHG EPIDURAL/SPINAL ANESTHESIA FOR LABOR

## 2021-08-28 PROCEDURE — 700111 HCHG RX REV CODE 636 W/ 250 OVERRIDE (IP): Performed by: ANESTHESIOLOGY

## 2021-08-28 PROCEDURE — 700111 HCHG RX REV CODE 636 W/ 250 OVERRIDE (IP): Performed by: NURSE PRACTITIONER

## 2021-08-28 PROCEDURE — 85025 COMPLETE CBC W/AUTO DIFF WBC: CPT

## 2021-08-28 PROCEDURE — 59409 OBSTETRICAL CARE: CPT

## 2021-08-28 PROCEDURE — 700105 HCHG RX REV CODE 258: Performed by: NURSE PRACTITIONER

## 2021-08-28 PROCEDURE — 700102 HCHG RX REV CODE 250 W/ 637 OVERRIDE(OP): Performed by: NURSE PRACTITIONER

## 2021-08-28 PROCEDURE — 700111 HCHG RX REV CODE 636 W/ 250 OVERRIDE (IP)

## 2021-08-28 PROCEDURE — 59400 OBSTETRICAL CARE: CPT | Mod: MISDOCU | Performed by: NURSE PRACTITIONER

## 2021-08-28 PROCEDURE — 700105 HCHG RX REV CODE 258: Performed by: OBSTETRICS & GYNECOLOGY

## 2021-08-28 PROCEDURE — A9270 NON-COVERED ITEM OR SERVICE: HCPCS | Performed by: NURSE PRACTITIONER

## 2021-08-28 PROCEDURE — 770002 HCHG ROOM/CARE - OB PRIVATE (112)

## 2021-08-28 RX ORDER — SODIUM CHLORIDE, SODIUM LACTATE, POTASSIUM CHLORIDE, AND CALCIUM CHLORIDE .6; .31; .03; .02 G/100ML; G/100ML; G/100ML; G/100ML
1000 INJECTION, SOLUTION INTRAVENOUS ONCE
Status: COMPLETED | OUTPATIENT
Start: 2021-08-28 | End: 2021-08-28

## 2021-08-28 RX ORDER — BUPIVACAINE HYDROCHLORIDE 2.5 MG/ML
INJECTION, SOLUTION EPIDURAL; INFILTRATION; INTRACAUDAL
Status: COMPLETED
Start: 2021-08-28 | End: 2021-08-28

## 2021-08-28 RX ORDER — SODIUM CHLORIDE, SODIUM LACTATE, POTASSIUM CHLORIDE, AND CALCIUM CHLORIDE .6; .31; .03; .02 G/100ML; G/100ML; G/100ML; G/100ML
1000 INJECTION, SOLUTION INTRAVENOUS
Status: CANCELLED | OUTPATIENT
Start: 2021-08-28

## 2021-08-28 RX ORDER — ROPIVACAINE HYDROCHLORIDE 2 MG/ML
INJECTION, SOLUTION EPIDURAL; INFILTRATION; PERINEURAL
Status: COMPLETED
Start: 2021-08-28 | End: 2021-08-28

## 2021-08-28 RX ORDER — LIDOCAINE HYDROCHLORIDE AND EPINEPHRINE 15; 5 MG/ML; UG/ML
INJECTION, SOLUTION EPIDURAL
Status: COMPLETED | OUTPATIENT
Start: 2021-08-28 | End: 2021-08-28

## 2021-08-28 RX ORDER — ROPIVACAINE HYDROCHLORIDE 2 MG/ML
INJECTION, SOLUTION EPIDURAL; INFILTRATION; PERINEURAL CONTINUOUS
Status: CANCELLED | OUTPATIENT
Start: 2021-08-28

## 2021-08-28 RX ORDER — SODIUM CHLORIDE, SODIUM LACTATE, POTASSIUM CHLORIDE, AND CALCIUM CHLORIDE .6; .31; .03; .02 G/100ML; G/100ML; G/100ML; G/100ML
250 INJECTION, SOLUTION INTRAVENOUS PRN
Status: CANCELLED | OUTPATIENT
Start: 2021-08-28

## 2021-08-28 RX ORDER — BUPIVACAINE HYDROCHLORIDE 2.5 MG/ML
INJECTION, SOLUTION EPIDURAL; INFILTRATION; INTRACAUDAL
Status: COMPLETED | OUTPATIENT
Start: 2021-08-28 | End: 2021-08-28

## 2021-08-28 RX ORDER — SODIUM CHLORIDE, SODIUM LACTATE, POTASSIUM CHLORIDE, CALCIUM CHLORIDE 600; 310; 30; 20 MG/100ML; MG/100ML; MG/100ML; MG/100ML
INJECTION, SOLUTION INTRAVENOUS CONTINUOUS
Status: DISCONTINUED | OUTPATIENT
Start: 2021-08-28 | End: 2021-08-30 | Stop reason: HOSPADM

## 2021-08-28 RX ADMIN — SODIUM CHLORIDE 2.5 MILLION UNITS: 9 INJECTION, SOLUTION INTRAVENOUS at 16:59

## 2021-08-28 RX ADMIN — SODIUM CHLORIDE 2.5 MILLION UNITS: 9 INJECTION, SOLUTION INTRAVENOUS at 20:59

## 2021-08-28 RX ADMIN — SODIUM CHLORIDE 2.5 MILLION UNITS: 9 INJECTION, SOLUTION INTRAVENOUS at 07:53

## 2021-08-28 RX ADMIN — OXYTOCIN 1 MILLI-UNITS/MIN: 10 INJECTION, SOLUTION INTRAMUSCULAR; INTRAVENOUS at 06:17

## 2021-08-28 RX ADMIN — FENTANYL CITRATE 50 MCG: 50 INJECTION, SOLUTION INTRAMUSCULAR; INTRAVENOUS at 11:59

## 2021-08-28 RX ADMIN — MISOPROSTOL 25 MCG: 100 TABLET ORAL at 00:07

## 2021-08-28 RX ADMIN — LIDOCAINE HYDROCHLORIDE,EPINEPHRINE BITARTRATE 3 ML: 15; .005 INJECTION, SOLUTION EPIDURAL; INFILTRATION; INTRACAUDAL; PERINEURAL at 19:35

## 2021-08-28 RX ADMIN — FENTANYL CITRATE 100 MCG: 50 INJECTION, SOLUTION INTRAMUSCULAR; INTRAVENOUS at 16:37

## 2021-08-28 RX ADMIN — FENTANYL CITRATE 100 MCG: 50 INJECTION, SOLUTION INTRAMUSCULAR; INTRAVENOUS at 19:35

## 2021-08-28 RX ADMIN — BUPIVACAINE HYDROCHLORIDE 8 ML: 2.5 INJECTION, SOLUTION EPIDURAL; INFILTRATION; INTRACAUDAL; PERINEURAL at 19:35

## 2021-08-28 RX ADMIN — OXYTOCIN 41.67 MILLI-UNITS/MIN: 10 INJECTION, SOLUTION INTRAMUSCULAR; INTRAVENOUS at 22:45

## 2021-08-28 RX ADMIN — ROPIVACAINE HYDROCHLORIDE 200 MG: 2 INJECTION, SOLUTION EPIDURAL; INFILTRATION at 19:39

## 2021-08-28 RX ADMIN — SODIUM CHLORIDE 2.5 MILLION UNITS: 9 INJECTION, SOLUTION INTRAVENOUS at 12:05

## 2021-08-28 RX ADMIN — TERBUTALINE SULFATE 0.25 MG: 1 INJECTION SUBCUTANEOUS at 02:22

## 2021-08-28 RX ADMIN — SODIUM CHLORIDE 5 MILLION UNITS: 900 INJECTION INTRAVENOUS at 06:15

## 2021-08-28 RX ADMIN — SODIUM CHLORIDE, POTASSIUM CHLORIDE, SODIUM LACTATE AND CALCIUM CHLORIDE: 600; 310; 30; 20 INJECTION, SOLUTION INTRAVENOUS at 14:45

## 2021-08-28 RX ADMIN — SODIUM CHLORIDE, POTASSIUM CHLORIDE, SODIUM LACTATE AND CALCIUM CHLORIDE 1000 ML: 600; 310; 30; 20 INJECTION, SOLUTION INTRAVENOUS at 18:30

## 2021-08-28 RX ADMIN — FENTANYL CITRATE 100 MCG: 50 INJECTION, SOLUTION INTRAMUSCULAR; INTRAVENOUS at 15:18

## 2021-08-28 RX ADMIN — FENTANYL CITRATE 100 MCG: 50 INJECTION, SOLUTION INTRAMUSCULAR; INTRAVENOUS at 13:09

## 2021-08-28 ASSESSMENT — LIFESTYLE VARIABLES
EVER FELT BAD OR GUILTY ABOUT YOUR DRINKING: NO
TOTAL SCORE: 0
EVER HAD A DRINK FIRST THING IN THE MORNING TO STEADY YOUR NERVES TO GET RID OF A HANGOVER: NO
TOTAL SCORE: 0
CONSUMPTION TOTAL: INCOMPLETE
HAVE PEOPLE ANNOYED YOU BY CRITICIZING YOUR DRINKING: NO
HAVE YOU EVER FELT YOU SHOULD CUT DOWN ON YOUR DRINKING: NO
EVER_SMOKED: NEVER
TOTAL SCORE: 0
ALCOHOL_USE: NO

## 2021-08-28 ASSESSMENT — PAIN SCALES - GENERAL
PAIN_LEVEL: 0
PAINLEVEL: 0 - NO PAIN

## 2021-08-28 ASSESSMENT — PATIENT HEALTH QUESTIONNAIRE - PHQ9
1. LITTLE INTEREST OR PLEASURE IN DOING THINGS: NOT AT ALL
SUM OF ALL RESPONSES TO PHQ9 QUESTIONS 1 AND 2: 0
2. FEELING DOWN, DEPRESSED, IRRITABLE, OR HOPELESS: NOT AT ALL

## 2021-08-28 NOTE — PROGRESS NOTES
0700: Report received from Letha WATSON RN. POC discussed with pt and SO Juan, all questions and concerns addressed. Pt states she is uncomfortable with her UC's, but declines pain management at this time. Pt educated on different pain management interventions.     0732: LIZA Martin at bedside, SVE 3/50/-2    1111: Pt calls out stating she felt a gush of fluid, clear fluid noted on the pad.     1124: SVE 3/50/-2    1531: SVE 3-4/50/-3    1546: Dr. Toro at bedside, forebag broken at this time, clear fluid noted. SVE 3-4/50/-3    1804: Dr. Toro at bedside to evaluate.  E 4/70/-2, MD recommends epidural at this time, pt unsure if she wants an epidural at this time.     1900: Report given to Letha WATSON RN.

## 2021-08-28 NOTE — H&P
History and Physical      Nataliia Falk is a 26 y.o. year old female  at 40w2d who presents for IOL for post dates.    Subjective:   positive  For CTXS.   negative Feels pain   negative for LOF  negative for vaginal bleeding.   positive for fetal movement    ROS: A comprehensive review of systems was negative.    Past Medical History:   Diagnosis Date   • Anemia 10/16/2012   • Decreased platelet count (HCC) 2012   • Pregnancy      No past surgical history on file.  OB History    Para Term  AB Living   2 1 1     1   SAB TAB Ectopic Molar Multiple Live Births             1      # Outcome Date GA Lbr Memo/2nd Weight Sex Delivery Anes PTL Lv   2 Current            1 Term 12   3.204 kg (7 lb 1 oz) F Vag-Spont   TAYLOR      Birth Comments: System Generated. Please review and update pregnancy details.     Social History     Socioeconomic History   • Marital status:      Spouse name: Not on file   • Number of children: Not on file   • Years of education: Not on file   • Highest education level: Not on file   Occupational History   • Not on file   Tobacco Use   • Smoking status: Never Smoker   • Smokeless tobacco: Never Used   Vaping Use   • Vaping Use: Never used   Substance and Sexual Activity   • Alcohol use: No   • Drug use: Not Currently   • Sexual activity: Yes     Partners: Male   Other Topics Concern   • Not on file   Social History Narrative   • Not on file     Social Determinants of Health     Financial Resource Strain:    • Difficulty of Paying Living Expenses:    Food Insecurity:    • Worried About Running Out of Food in the Last Year:    • Ran Out of Food in the Last Year:    Transportation Needs:    • Lack of Transportation (Medical):    • Lack of Transportation (Non-Medical):    Physical Activity:    • Days of Exercise per Week:    • Minutes of Exercise per Session:    Stress:    • Feeling of Stress :    Social Connections:    • Frequency of Communication with  "Friends and Family:    • Frequency of Social Gatherings with Friends and Family:    • Attends Buddhist Services:    • Active Member of Clubs or Organizations:    • Attends Club or Organization Meetings:    • Marital Status:    Intimate Partner Violence:    • Fear of Current or Ex-Partner:    • Emotionally Abused:    • Physically Abused:    • Sexually Abused:      Allergies: Patient has no known allergies.  No current facility-administered medications for this encounter.    Prenatal care with [facility] starting at 21wks with following problems:  Patient Active Problem List    Diagnosis Date Noted   • Cholelithiasis affecting pregnancy, antepartum 05/17/2021   • History of thrombocytopenia-6/8 plt 113 05/07/2021   • History of PCOS 05/07/2021   • Anemia 10/16/2012   • Supervision of other normal pregnancy, antepartum 04/27/2012         Objective:      Ht 1.448 m (4' 9\")   Wt 79.4 kg (175 lb)     General:   no acute distress, alert, cooperative   Skin:   normal   HEENT:  PERRLA   Lungs:   CTA bilateral   Heart:   S1, S2 normal, no murmur, click, rub or gallop, regular rate and rhythm, brisk carotid upstroke without bruits, peripheral pulses very brisk, chest is clear without rales or wheezing, no pedal edema, no JVD, no hepatosplenomegaly   Abdomen:   gravid, NT   EFW:  3400 g   Pelvis:  Exam deferred., proven to 3200 g   FHTs: 145 BPM + accels +early decels mod variability   Contractions: 1 contractions in 10 min mild to palpation   Uterine Size: S=D   Presentations: Cephalic   Cervix:     Dilation: 1cm    Effacement: 50%    Station:  -2    Consistency: Medium    Position: Middle     Complete OB US  WNL scanned in media    Lab Review  Lab:   Blood type: O     Recent Results (from the past 5880 hour(s))   HCG QUANTITATIVE    Collection Time: 12/29/20  7:50 AM   Result Value Ref Range    Bhcg 2097.0 (H) 0.0 - 5.0 mIU/mL   THINPREP RFLX HPV ASC AND ABOVE W/CTNG    Collection Time: 02/16/21 12:18 PM   Result Value Ref Range "    Cytology Reg See Path Report     C. trachomatis by PCR Negative Negative    N. gonorrhoeae by PCR Negative Negative    Source Endo/Cervical    CBC WITH DIFFERENTIAL    Collection Time: 03/16/21 11:15 AM   Result Value Ref Range    WBC 9.5 4.8 - 10.8 K/uL    RBC 4.14 (L) 4.20 - 5.40 M/uL    Hemoglobin 13.6 12.0 - 16.0 g/dL    Hematocrit 40.0 37.0 - 47.0 %    MCV 96.6 81.4 - 97.8 fL    MCH 32.9 27.0 - 33.0 pg    MCHC 34.0 33.6 - 35.0 g/dL    RDW 49.2 35.9 - 50.0 fL    Platelet Count 129 (L) 164 - 446 K/uL    MPV 13.9 (H) 9.0 - 12.9 fL    Neutrophils-Polys 70.00 44.00 - 72.00 %    Lymphocytes 23.30 22.00 - 41.00 %    Monocytes 5.30 0.00 - 13.40 %    Eosinophils 0.80 0.00 - 6.90 %    Basophils 0.10 0.00 - 1.80 %    Immature Granulocytes 0.50 0.00 - 0.90 %    Nucleated RBC 0.00 /100 WBC    Neutrophils (Absolute) 6.62 2.00 - 7.15 K/uL    Lymphs (Absolute) 2.20 1.00 - 4.80 K/uL    Monos (Absolute) 0.50 0.00 - 0.85 K/uL    Eos (Absolute) 0.08 0.00 - 0.51 K/uL    Baso (Absolute) 0.01 0.00 - 0.12 K/uL    Immature Granulocytes (abs) 0.05 0.00 - 0.11 K/uL    NRBC (Absolute) 0.00 K/uL   OP Prenatal Panel-Blood Bank    Collection Time: 03/16/21 11:15 AM   Result Value Ref Range    ABO Grouping Only O     Rh Grouping Only POS     Antibody Screen Scrn NEG    HEP B SURFACE ANTIGEN    Collection Time: 03/16/21 11:15 AM   Result Value Ref Range    Hepatitis B Surface Antigen Non-Reactive Non-Reactive   HIV AG/AB COMBO ASSAY SCREENING    Collection Time: 03/16/21 11:15 AM   Result Value Ref Range    HIV Ag/Ab Combo Assay Non-Reactive Non Reactive   HEP C VIRUS ANTIBODY    Collection Time: 03/16/21 11:15 AM   Result Value Ref Range    Hepatitis C Antibody Non-Reactive Non-Reactive   T.PALLIDUM AB EIA    Collection Time: 03/16/21 11:15 AM   Result Value Ref Range    Syphilis, Treponemal Qual Non-Reactive Non-Reactive   RUBELLA ABS IGG    Collection Time: 03/16/21 11:15 AM   Result Value Ref Range    Rubella IgG Antibody 49.00 IU/mL    URINE CULTURE(NEW)    Collection Time: 03/16/21 11:15 AM    Specimen: Urine   Result Value Ref Range    Significant Indicator NEG     Source UR     Site Clean Catch     Culture Result Usual skin kathrine 10-50,000 cfu/mL    POCT urinalysis device results    Collection Time: 04/16/21 12:19 PM   Result Value Ref Range    POC Color Kandy     POC Appearance Clear     POC Glucose Negative Negative mg/dL    POC Ketones Negative Negative mg/dL    POC Specific Gravity 1.025 1.005 - 1.030    POC Blood Negative Negative    POC Urine PH 7.0 5.0 - 8.0    POC Protein Trace (A) Negative mg/dL    POC Nitrites Negative Negative    POC Leukocyte Esterase Negative Negative   POCT urinalysis device results    Collection Time: 05/09/21  3:11 AM   Result Value Ref Range    POC Color Yellow     POC Appearance Slightly Cloudy (A)     POC Glucose Negative Negative mg/dL    POC Ketones Negative Negative mg/dL    POC Specific Gravity 1.025 1.005 - 1.030    POC Blood Negative Negative    POC Urine PH 7.0 5.0 - 8.0    POC Protein Trace (A) Negative mg/dL    POC Nitrites Negative Negative    POC Leukocyte Esterase Negative Negative   Comp Metabolic Panel    Collection Time: 05/09/21  5:03 AM   Result Value Ref Range    Sodium 137 135 - 145 mmol/L    Potassium 3.4 (L) 3.6 - 5.5 mmol/L    Chloride 106 96 - 112 mmol/L    Co2 22 20 - 33 mmol/L    Anion Gap 9.0 7.0 - 16.0    Glucose 157 (H) 65 - 99 mg/dL    Bun 6 (L) 8 - 22 mg/dL    Creatinine 0.48 (L) 0.50 - 1.40 mg/dL    Calcium 9.0 8.5 - 10.5 mg/dL    AST(SGOT) 183 (H) 12 - 45 U/L    ALT(SGPT) 99 (H) 2 - 50 U/L    Alkaline Phosphatase 74 30 - 99 U/L    Total Bilirubin 0.6 0.1 - 1.5 mg/dL    Albumin 3.4 3.2 - 4.9 g/dL    Total Protein 6.2 6.0 - 8.2 g/dL    Globulin 2.8 1.9 - 3.5 g/dL    A-G Ratio 1.2 g/dL   CBC WITH DIFFERENTIAL    Collection Time: 05/09/21  5:03 AM   Result Value Ref Range    WBC 11.5 (H) 4.8 - 10.8 K/uL    RBC 3.53 (L) 4.20 - 5.40 M/uL    Hemoglobin 12.0 12.0 - 16.0 g/dL     Hematocrit 35.1 (L) 37.0 - 47.0 %    MCV 99.4 (H) 81.4 - 97.8 fL    MCH 34.0 (H) 27.0 - 33.0 pg    MCHC 34.2 33.6 - 35.0 g/dL    RDW 50.5 (H) 35.9 - 50.0 fL    Platelet Count 111 (L) 164 - 446 K/uL    MPV 13.8 (H) 9.0 - 12.9 fL    Neutrophils-Polys 75.70 (H) 44.00 - 72.00 %    Lymphocytes 16.40 (L) 22.00 - 41.00 %    Monocytes 6.00 0.00 - 13.40 %    Eosinophils 0.70 0.00 - 6.90 %    Basophils 0.30 0.00 - 1.80 %    Immature Granulocytes 0.90 0.00 - 0.90 %    Nucleated RBC 0.00 /100 WBC    Neutrophils (Absolute) 8.70 (H) 2.00 - 7.15 K/uL    Lymphs (Absolute) 1.89 1.00 - 4.80 K/uL    Monos (Absolute) 0.69 0.00 - 0.85 K/uL    Eos (Absolute) 0.08 0.00 - 0.51 K/uL    Baso (Absolute) 0.03 0.00 - 0.12 K/uL    Immature Granulocytes (abs) 0.10 0.00 - 0.11 K/uL    NRBC (Absolute) 0.00 K/uL   ESTIMATED GFR    Collection Time: 05/09/21  5:03 AM   Result Value Ref Range    GFR If African American >60 >60 mL/min/1.73 m 2    GFR If Non African American >60 >60 mL/min/1.73 m 2   POCT urinalysis device results    Collection Time: 05/17/21  7:06 PM   Result Value Ref Range    POC Color Yellow     POC Appearance Clear     POC Glucose Negative Negative mg/dL    POC Ketones Negative Negative mg/dL    POC Specific Gravity 1.020 1.005 - 1.030    POC Blood Negative Negative    POC Urine PH 7.0 5.0 - 8.0    POC Protein 30 (A) Negative mg/dL    POC Nitrites Negative Negative    POC Leukocyte Esterase Negative Negative   CBC WITH DIFFERENTIAL    Collection Time: 05/17/21  8:23 PM   Result Value Ref Range    WBC 11.6 (H) 4.8 - 10.8 K/uL    RBC 3.59 (L) 4.20 - 5.40 M/uL    Hemoglobin 11.8 (L) 12.0 - 16.0 g/dL    Hematocrit 35.9 (L) 37.0 - 47.0 %    .0 (H) 81.4 - 97.8 fL    MCH 32.9 27.0 - 33.0 pg    MCHC 32.9 (L) 33.6 - 35.0 g/dL    RDW 50.3 (H) 35.9 - 50.0 fL    Platelet Count 119 (L) 164 - 446 K/uL    MPV 13.5 (H) 9.0 - 12.9 fL    Neutrophils-Polys 74.70 (H) 44.00 - 72.00 %    Lymphocytes 17.30 (L) 22.00 - 41.00 %    Monocytes 6.10 0.00  - 13.40 %    Eosinophils 0.70 0.00 - 6.90 %    Basophils 0.30 0.00 - 1.80 %    Immature Granulocytes 0.90 0.00 - 0.90 %    Nucleated RBC 0.00 /100 WBC    Neutrophils (Absolute) 8.64 (H) 2.00 - 7.15 K/uL    Lymphs (Absolute) 2.00 1.00 - 4.80 K/uL    Monos (Absolute) 0.71 0.00 - 0.85 K/uL    Eos (Absolute) 0.08 0.00 - 0.51 K/uL    Baso (Absolute) 0.03 0.00 - 0.12 K/uL    Immature Granulocytes (abs) 0.10 0.00 - 0.11 K/uL    NRBC (Absolute) 0.00 K/uL   Comp Metabolic Panel    Collection Time: 05/17/21  8:23 PM   Result Value Ref Range    Sodium 139 135 - 145 mmol/L    Potassium 4.0 3.6 - 5.5 mmol/L    Chloride 107 96 - 112 mmol/L    Co2 23 20 - 33 mmol/L    Anion Gap 9.0 7.0 - 16.0    Glucose 94 65 - 99 mg/dL    Bun 5 (L) 8 - 22 mg/dL    Creatinine 0.61 0.50 - 1.40 mg/dL    Calcium 8.8 8.5 - 10.5 mg/dL    AST(SGOT) 122 (H) 12 - 45 U/L    ALT(SGPT) 79 (H) 2 - 50 U/L    Alkaline Phosphatase 74 30 - 99 U/L    Total Bilirubin 0.4 0.1 - 1.5 mg/dL    Albumin 3.4 3.2 - 4.9 g/dL    Total Protein 6.1 6.0 - 8.2 g/dL    Globulin 2.7 1.9 - 3.5 g/dL    A-G Ratio 1.3 g/dL   URIC ACID    Collection Time: 05/17/21  8:23 PM   Result Value Ref Range    Uric Acid 3.7 1.9 - 8.2 mg/dL   AMYLASE    Collection Time: 05/17/21  8:23 PM   Result Value Ref Range    Amylase 56 20 - 103 U/L   LIPASE    Collection Time: 05/17/21  8:23 PM   Result Value Ref Range    Lipase 49 11 - 82 U/L   ESTIMATED GFR    Collection Time: 05/17/21  8:23 PM   Result Value Ref Range    GFR If African American >60 >60 mL/min/1.73 m 2    GFR If Non African American >60 >60 mL/min/1.73 m 2   T.PALLIDUM AB EIA    Collection Time: 06/08/21  1:46 PM   Result Value Ref Range    Syphilis, Treponemal Qual Non-Reactive Non-Reactive   CBC WITH DIFFERENTIAL    Collection Time: 06/08/21  1:46 PM   Result Value Ref Range    WBC 9.8 4.8 - 10.8 K/uL    RBC 3.74 (L) 4.20 - 5.40 M/uL    Hemoglobin 12.4 12.0 - 16.0 g/dL    Hematocrit 37.2 37.0 - 47.0 %    MCV 99.5 (H) 81.4 - 97.8 fL     MCH 33.2 (H) 27.0 - 33.0 pg    MCHC 33.3 (L) 33.6 - 35.0 g/dL    RDW 50.8 (H) 35.9 - 50.0 fL    Platelet Count 113 (L) 164 - 446 K/uL    MPV 13.7 (H) 9.0 - 12.9 fL    Neutrophils-Polys 68.60 44.00 - 72.00 %    Lymphocytes 22.60 22.00 - 41.00 %    Monocytes 6.20 0.00 - 13.40 %    Eosinophils 1.50 0.00 - 6.90 %    Basophils 0.30 0.00 - 1.80 %    Immature Granulocytes 0.80 0.00 - 0.90 %    Nucleated RBC 0.00 /100 WBC    Neutrophils (Absolute) 6.69 2.00 - 7.15 K/uL    Lymphs (Absolute) 2.21 1.00 - 4.80 K/uL    Monos (Absolute) 0.61 0.00 - 0.85 K/uL    Eos (Absolute) 0.15 0.00 - 0.51 K/uL    Baso (Absolute) 0.03 0.00 - 0.12 K/uL    Immature Granulocytes (abs) 0.08 0.00 - 0.11 K/uL    NRBC (Absolute) 0.00 K/uL   GLUCOSE 1HR GESTATIONAL    Collection Time: 06/08/21  1:46 PM   Result Value Ref Range    Glucose, Post Dose 93 70 - 139 mg/dL   CBC WITH DIFFERENTIAL    Collection Time: 06/17/21  5:55 AM   Result Value Ref Range    WBC 13.0 (H) 4.8 - 10.8 K/uL    RBC 3.58 (L) 4.20 - 5.40 M/uL    Hemoglobin 11.7 (L) 12.0 - 16.0 g/dL    Hematocrit 34.9 (L) 37.0 - 47.0 %    MCV 97.5 81.4 - 97.8 fL    MCH 32.7 27.0 - 33.0 pg    MCHC 33.5 (L) 33.6 - 35.0 g/dL    RDW 49.7 35.9 - 50.0 fL    Platelet Count 109 (L) 164 - 446 K/uL    MPV 13.6 (H) 9.0 - 12.9 fL    Neutrophils-Polys 79.60 (H) 44.00 - 72.00 %    Lymphocytes 14.20 (L) 22.00 - 41.00 %    Monocytes 4.70 0.00 - 13.40 %    Eosinophils 0.50 0.00 - 6.90 %    Basophils 0.20 0.00 - 1.80 %    Immature Granulocytes 0.80 0.00 - 0.90 %    Nucleated RBC 0.00 /100 WBC    Neutrophils (Absolute) 10.34 (H) 2.00 - 7.15 K/uL    Lymphs (Absolute) 1.85 1.00 - 4.80 K/uL    Monos (Absolute) 0.61 0.00 - 0.85 K/uL    Eos (Absolute) 0.06 0.00 - 0.51 K/uL    Baso (Absolute) 0.03 0.00 - 0.12 K/uL    Immature Granulocytes (abs) 0.10 0.00 - 0.11 K/uL    NRBC (Absolute) 0.00 K/uL   Comp Metabolic Panel    Collection Time: 06/17/21  5:55 AM   Result Value Ref Range    Sodium 138 135 - 145 mmol/L     Potassium 3.8 3.6 - 5.5 mmol/L    Chloride 106 96 - 112 mmol/L    Co2 22 20 - 33 mmol/L    Anion Gap 10.0 7.0 - 16.0    Glucose 111 (H) 65 - 99 mg/dL    Bun 6 (L) 8 - 22 mg/dL    Creatinine 0.52 0.50 - 1.40 mg/dL    Calcium 9.0 8.5 - 10.5 mg/dL    AST(SGOT) 74 (H) 12 - 45 U/L    ALT(SGPT) 46 2 - 50 U/L    Alkaline Phosphatase 81 30 - 99 U/L    Total Bilirubin 0.7 0.1 - 1.5 mg/dL    Albumin 3.3 3.2 - 4.9 g/dL    Total Protein 6.2 6.0 - 8.2 g/dL    Globulin 2.9 1.9 - 3.5 g/dL    A-G Ratio 1.1 g/dL   AMYLASE    Collection Time: 06/17/21  5:55 AM   Result Value Ref Range    Amylase 117 (H) 20 - 103 U/L   LIPASE    Collection Time: 06/17/21  5:55 AM   Result Value Ref Range    Lipase 232 (H) 11 - 82 U/L   ESTIMATED GFR    Collection Time: 06/17/21  5:55 AM   Result Value Ref Range    GFR If African American >60 >60 mL/min/1.73 m 2    GFR If Non African American >60 >60 mL/min/1.73 m 2   GRP B STREP, BY PCR ([facility])    Collection Time: 08/03/21  8:59 AM    Specimen: Genital   Result Value Ref Range    Strep Gp B DNA PCR POSITIVE (A) Negative   SARS-CoV-2 PCR (24 hour In-House): Collect NP swab in VTM    Collection Time: 08/23/21  5:45 PM    Specimen: Nasopharyngeal; Respirate   Result Value Ref Range    SARS-CoV-2 Source NP Swab     SARS-CoV-2 by PCR NotDetected         Assessment:   1.  IUP at 40w2d  2.  Labor status: Not in labor.  3.  Cat 2 FHTs  4.  Obstetrical history significant for   Patient Active Problem List    Diagnosis Date Noted   • Cholelithiasis affecting pregnancy, antepartum 05/17/2021   • History of thrombocytopenia-6/8 plt 113 05/07/2021   • History of PCOS 05/07/2021   • Anemia 10/16/2012   • Supervision of other normal pregnancy, antepartum 04/27/2012   .      Plan:     Admit to L&D  GBS positive - PCN ordered.  Routine labs  Pain management prn - pt plans natural delivery.  Cytotec for cervical ripening

## 2021-08-28 NOTE — PROGRESS NOTES
"S: Pt is feeling longer lasting contractions, but not stronger yet.  Called to BS by RN for FHT decel.  O:    Vitals:    08/27/21 2300 08/28/21 0006   BP:  103/68   Pulse:  98   Temp:  36.9 °C (98.5 °F)   TempSrc:  Axillary   Weight: 79.4 kg (175 lb)    Height: 1.448 m (4' 9\")            FHTs:  Baseline 130, + accels, +prolonged decel, mod variability        Sayre: 3 contractions in 10 minutes, mod to palpation, no pitocin        SVE: 2/50/-2        Terbutaline given by RN.        Position changed.     A/P:    1.  IUP @ 40w3d   2.  Cat 2 FHTs    3.  IOL for post-dates.    4.  GBS pos - continue PCN.  5.  Will allow FHTs to recover.     Anu Martin, CNM, APN  Dr. Cyr -- attending physician          "

## 2021-08-28 NOTE — CARE PLAN
Problem: Risk for Infection and Impaired Wound Healing  Goal: Patient will remain free from infection  Outcome: Progressing  Note: PT remains free from signs/symptoms of infection, pt afebrile.      Problem: Pain  Goal: Patient's pain will be alleviated or reduced to the patient’s comfort goal  Outcome: Progressing  Flowsheets (Taken 8/28/2021 0800)  OB Pain Level: 1-Coping  Note: Pt is comfortable with pain at this time, pain management interventions decline, pt repositions as needed.

## 2021-08-28 NOTE — PROGRESS NOTES
Labor Check    S: Pt examined.     O:  Gen: AAO in NAD  Abd: gravid, nontender to palpation  SVE: 3-4 / 50 /-3 with forebag palpated  Ext: nontender bl, nonedematous    FHT: 130 / mod prince / reactive  [place]: irregularly    A/P:  26 y.o.  at 40w3d for IOL for PD  - epidural PRN, pt declines now  - forebag ruptured with clear fluids

## 2021-08-28 NOTE — PROGRESS NOTES
"S: Pt is getting more uncomfortable c contractions. Declines any pain medication at this time.    O:    Vitals:    08/27/21 2300 08/28/21 0006   BP:  103/68   Pulse:  98   Temp:  36.9 °C (98.5 °F)   TempSrc:  Axillary   Weight: 79.4 kg (175 lb)    Height: 1.448 m (4' 9\")            FHTs:  Baseline 130, + accels, - decels, mod variability        [place]: 2 contractions in 10 minutes, mod to palpation, pitocin @ 9 milliunits        SVE: 3/50/-2     A/P:    1.  IUP @ 40w3d   2.  Cat 2 FHTs    3.  IOL for post dates.     4.  Continue pitocin per protocol.  5.  GBS pos - continue PCN.    Anu Martin, CNM, APN  Dr. Cyr -- attending physician          "

## 2021-08-29 ENCOUNTER — APPOINTMENT (OUTPATIENT)
Dept: RADIOLOGY | Facility: MEDICAL CENTER | Age: 26
End: 2021-08-29
Attending: PEDIATRICS
Payer: COMMERCIAL

## 2021-08-29 LAB
BLOOD CULTURE HOLD CXBCH: NORMAL
ERYTHROCYTE [DISTWIDTH] IN BLOOD BY AUTOMATED COUNT: 50.4 FL (ref 35.9–50)
HCT VFR BLD AUTO: 32.4 % (ref 37–47)
HGB BLD-MCNC: 10.7 G/DL (ref 12–16)
MCH RBC QN AUTO: 32.4 PG (ref 27–33)
MCHC RBC AUTO-ENTMCNC: 33 G/DL (ref 33.6–35)
MCV RBC AUTO: 98.2 FL (ref 81.4–97.8)
PLATELET # BLD AUTO: 87 K/UL (ref 164–446)
PMV BLD AUTO: 14.2 FL (ref 9–12.9)
RBC # BLD AUTO: 3.3 M/UL (ref 4.2–5.4)
WBC # BLD AUTO: 12.5 K/UL (ref 4.8–10.8)

## 2021-08-29 PROCEDURE — A9270 NON-COVERED ITEM OR SERVICE: HCPCS | Performed by: NURSE PRACTITIONER

## 2021-08-29 PROCEDURE — 36415 COLL VENOUS BLD VENIPUNCTURE: CPT

## 2021-08-29 PROCEDURE — 85027 COMPLETE CBC AUTOMATED: CPT

## 2021-08-29 PROCEDURE — 700102 HCHG RX REV CODE 250 W/ 637 OVERRIDE(OP): Performed by: NURSE PRACTITIONER

## 2021-08-29 PROCEDURE — 770002 HCHG ROOM/CARE - OB PRIVATE (112)

## 2021-08-29 RX ORDER — IBUPROFEN 800 MG/1
800 TABLET ORAL EVERY 8 HOURS PRN
Status: DISCONTINUED | OUTPATIENT
Start: 2021-08-29 | End: 2021-08-30 | Stop reason: HOSPADM

## 2021-08-29 RX ORDER — HYDROCODONE BITARTRATE AND ACETAMINOPHEN 5; 325 MG/1; MG/1
1 TABLET ORAL EVERY 4 HOURS PRN
Status: DISCONTINUED | OUTPATIENT
Start: 2021-08-29 | End: 2021-08-30 | Stop reason: HOSPADM

## 2021-08-29 RX ORDER — HYDROCODONE BITARTRATE AND ACETAMINOPHEN 10; 325 MG/1; MG/1
1 TABLET ORAL EVERY 4 HOURS PRN
Status: DISCONTINUED | OUTPATIENT
Start: 2021-08-29 | End: 2021-08-30 | Stop reason: HOSPADM

## 2021-08-29 RX ORDER — DOCUSATE SODIUM 100 MG/1
100 CAPSULE, LIQUID FILLED ORAL 2 TIMES DAILY PRN
Status: DISCONTINUED | OUTPATIENT
Start: 2021-08-29 | End: 2021-08-30 | Stop reason: HOSPADM

## 2021-08-29 RX ORDER — BISACODYL 10 MG
10 SUPPOSITORY, RECTAL RECTAL PRN
Status: DISCONTINUED | OUTPATIENT
Start: 2021-08-29 | End: 2021-08-30 | Stop reason: HOSPADM

## 2021-08-29 RX ORDER — ACETAMINOPHEN 325 MG/1
325 TABLET ORAL EVERY 4 HOURS PRN
Status: DISCONTINUED | OUTPATIENT
Start: 2021-08-29 | End: 2021-08-30 | Stop reason: HOSPADM

## 2021-08-29 RX ORDER — MISOPROSTOL 200 UG/1
600 TABLET ORAL
Status: DISCONTINUED | OUTPATIENT
Start: 2021-08-29 | End: 2021-08-30 | Stop reason: HOSPADM

## 2021-08-29 RX ORDER — IBUPROFEN 800 MG/1
800 TABLET ORAL EVERY 6 HOURS PRN
Status: DISCONTINUED | OUTPATIENT
Start: 2021-08-29 | End: 2021-08-29

## 2021-08-29 RX ORDER — SODIUM CHLORIDE, SODIUM LACTATE, POTASSIUM CHLORIDE, CALCIUM CHLORIDE 600; 310; 30; 20 MG/100ML; MG/100ML; MG/100ML; MG/100ML
INJECTION, SOLUTION INTRAVENOUS PRN
Status: DISCONTINUED | OUTPATIENT
Start: 2021-08-29 | End: 2021-08-30 | Stop reason: HOSPADM

## 2021-08-29 RX ADMIN — IBUPROFEN 800 MG: 800 TABLET, FILM COATED ORAL at 14:57

## 2021-08-29 RX ADMIN — HYDROCODONE BITARTRATE AND ACETAMINOPHEN 1 TABLET: 5; 325 TABLET ORAL at 21:08

## 2021-08-29 RX ADMIN — DOCUSATE SODIUM 100 MG: 100 CAPSULE ORAL at 02:40

## 2021-08-29 RX ADMIN — IBUPROFEN 800 MG: 800 TABLET, FILM COATED ORAL at 00:34

## 2021-08-29 RX ADMIN — HYDROCODONE BITARTRATE AND ACETAMINOPHEN 1 TABLET: 10; 325 TABLET ORAL at 06:14

## 2021-08-29 RX ADMIN — HYDROCODONE BITARTRATE AND ACETAMINOPHEN 1 TABLET: 5; 325 TABLET ORAL at 02:40

## 2021-08-29 ASSESSMENT — PAIN DESCRIPTION - PAIN TYPE: TYPE: ACUTE PAIN

## 2021-08-29 NOTE — ANESTHESIA POSTPROCEDURE EVALUATION
Patient: Nataliia Falk    Procedure Summary     Date: 08/28/21 Room / Location:     Anesthesia Start: 1930 Anesthesia Stop: 2155    Procedure: Labor Epidural Diagnosis:     Scheduled Providers:  Responsible Provider: Rebecca Townsend M.D.    Anesthesia Type: epidural ASA Status: 2          Final Anesthesia Type: epidural  Last vitals  BP   Blood Pressure: (!) 98/55    Temp   36.7 °C (98 °F)    Pulse   89   Resp        SpO2          Anesthesia Post Evaluation    Patient location during evaluation: bedside  Patient participation: complete - patient participated  Level of consciousness: awake and alert  Pain score: 0    Airway patency: patent  Anesthetic complications: no  Cardiovascular status: hemodynamically stable  Respiratory status: acceptable  Hydration status: euvolemic    PONV: none    patient able to participate, but full recovery from regional anesthesia has not occurred and is not expected within the stipulated timeframe for the completion of the evaluation      No complications documented.     Nurse Pain Score: 3 (NPRS)

## 2021-08-29 NOTE — LACTATION NOTE
This note was copied from a baby's chart.  MOB, FOB, and Family bedside. Baby has been in NBN, did get some formula. He is very sleepy and not BF well. Mom has started pumping. Reviewed pump settings, frequency, and maintenance. Mom will attempt to BF ad scottie, or wake baby to feed if greater than 2 hours during the day, or 4 hours during the night since previous feeding. If baby is too sleepy to eat, she can pump or do HE and spoon feeding, to help initiate milk supply and get baby some feedings. Mom has [employer] insurance, and wants to know about when they will send her personal pump, encouraged her to call and ask for one. Also let her know we do have rental pumps available. Mom may be choosing to do breast and bottle, and was educated. Provided written eductional materials for BF medicine center, zoom meeting. Encouraged to call for next feeding. Put on schedule to recheck wt LC tomorrow.

## 2021-08-29 NOTE — ANESTHESIA PROCEDURE NOTES
Epidural Block    Date/Time: 8/28/2021 7:35 PM  Performed by: Rebecca Townsend M.D.  Authorized by: Rebecca Townsend M.D.     Patient Location:  OB  Start Time:  8/28/2021 7:35 PM  End Time:  8/28/2021 7:47 PM  Reason for Block: labor analgesia    patient identified, IV checked, site marked, risks and benefits discussed, surgical consent, monitors and equipment checked, pre-op evaluation and timeout performed    Patient Position:  Sitting  Prep: ChloraPrep, patient draped and sterile technique    Monitoring:  Blood pressure, continuous pulse oximetry and heart rate  Approach:  Midline  Location:  L3-L4  Injection Technique:  KEYUR air and KEYUR saline  Skin infiltration:  Lidocaine  Strength:  1%  Dose:  5ml  Needle Type:  Tuohy  Needle Gauge:  17 G  Needle Length:  3.5 in  Loss of resistance::  5  Catheter Size:  19 G  Catheter at Skin Depth:  10  Test Dose Result:  Negative

## 2021-08-29 NOTE — PROGRESS NOTES
Obstetrics & Gynecology Post-Delivery Progress Note    Date of Service      26 y.o.  s/p vaginal delivery   Delivery date: 2021      Subjective  Pt reports pain the level of the umbillicus, 7/10 in strength. The pain is not excarebated by breastfeeding, and not alleviated by current pain management plan.   Patient has not other concerns at this time, she has been passing gas but has not yet had a bowel movement. Patient denies any fevers or chills.     Pain: 7/10  Bleeding: lochia minimal   Tolerating PO: yes  Voiding: yes  Ambulating: yes  Passing flatus: yes  Feeding: yes      Objective  24hr VS:  Temp:  [36.1 °C (97 °F)-36.7 °C (98.1 °F)] 36.2 °C (97.1 °F)  Pulse:  [] 84  Resp:  [18] 18  BP: ()/(55-70) 92/66  SpO2:  [96 %-99 %] 99 %    Physical Exam  Gen: well appearing, in no acute distress  CV: S1/S2 Present, no rubs, gallops or murmurs   Resp: CTAB  Abd:Fundus: tender to palpation at the level of the umbilicus   Ext: non edematous, calves nontender    Labs:  Recent Labs     21  1908 21  1037   WBC 15.3* 12.5*   RBC 3.87* 3.30*   HEMOGLOBIN 12.5 10.7*   HEMATOCRIT 37.5 32.4*   MCV 96.9 98.2*   MCH 32.3 32.4   RDW 49.3 50.4*   PLATELETCT 98* 87*   MPV 13.8* 14.2*   NEUTSPOLYS 79.70*  --    LYMPHOCYTES 14.40*  --    MONOCYTES 5.00  --    EOSINOPHILS 0.30  --    BASOPHILS 0.10  --          Medications  oxytocin, 2,000 mL/hr, Intravenous, Once      ibuprofen, acetaminophen, HYDROcodone-acetaminophen, HYDROcodone/acetaminophen, LR, oxytocin, misoprostol, docusate sodium, bisacodyl      Assessment/Plan  Nataliia Falk is a 26 y.o.yo  postpartum day #1 s/p vaginal delivery     - Post care: post delivery care and management   - Pain: 7/10, pains meds scheduled for better control   - Rh positive  - Method of Feeding: breastfeeding   - Method of Contraception:would like to discuss at outpatient setting     - Disposition: likely home tomorrow    Ailyn Santso M.D.  PhD

## 2021-08-29 NOTE — ANESTHESIA TIME REPORT
Anesthesia Start and Stop Event Times     Date Time Event    8/28/2021 1930 Anesthesia Start     2155 Anesthesia Stop        Responsible Staff  08/28/21    Name Role Begin End    Rebecca Townsend M.D. Anesth 1930 2155        Preop Diagnosis (Free Text):  Pre-op Diagnosis             Preop Diagnosis (Codes):    Post op Diagnosis  Pain during labor      Premium Reason  E. Weekend    Comments:

## 2021-08-29 NOTE — L&D DELIVERY NOTE
Joanna Sweeney, Student   Medical Student   Obstetrics & Gynecology   Non-Provider       Signed   Date of Service:  2021 10:04 PM                 []Hide copied text    []Nedver for details     Delivery Note for Vaginal Delivery      Stage 1:  26 y.o. y/o  at 40w3d weeks admitted induction of labor for post dates. Her pregnancy has been complicated by cholelithiasis and anemia. Her labor progressed with SROM and pitocin.  The patient was noted to be GBS positive and treated with penicillin. Fetal monitoring during labor was overall category I. Patient had an epidural for pain control.      Stage II: At , she was noted to be complete.  She labored down for 30  Minutes then pushed for 6 minutes to deliver a  viable, vigorous male infant in the MELVI position on 2021 at 2155.  The delivery was uncomplicated. Shoulders were delivered easily.  The infant was placed immediately upon mother’s abdomen. Apgars at 1 and 5 minutes were 8/8 and the infant has not yet been weighed.     Stage III: Attention was then turned to active management of the third stage. Pitocin was started via IV bolus. The placenta was delivered spontaneously with gentle manual traction on the umbilical cord with countertraction maintained suprapubically.  On inspection, the placenta was intact and had normal insertion.  Upon delivery of the placenta, good hemostasis was noted with fundal massage and a 40 unit bolus of pitocin in IV infusion was continued per protocol.      Laceration repair: The perineum was inspected following delivery. The patient did not have any lacerations.    Estimated blood loss for the above procedures was 150 ml.      Mother and infant in stable condition, and family pleased with birth.      Joanna Sweeney, Student Nurse Midwife.            Cosigned by: Melissa Urbano D.N.P. at 2021 10:13 PM

## 2021-08-29 NOTE — NON-PROVIDER
"S: Pt is resting comfortably in bed and reports complete pain relief from epidural.      O:    Vitals:    21 2300 21 0006 21 0713 21 1238   BP:  103/68 111/66 109/58   Pulse:  98 93 90   Temp:  36.9 °C (98.5 °F) 36.7 °C (98 °F) 36.7 °C (98 °F)   TempSrc:  Axillary Temporal Temporal   Weight: 79.4 kg (175 lb)      Height: 1.448 m (4' 9\")              FHTs:  Baseline 135, 15x15 accels, early decels, moderate variability        [place]: Contractions q 2-4 minutes, strong to palpation, pitocin @ 11 milliunits        SVE: 8cm/90%/-1     A/P:    1.  IUP @ 40w3d   2.  Cat 1 FHTs    3.  GBS Positive- continue prophylaxis    4.  Active labor: Anticipate     Joanna Sweeney, Student Nurse Midwife  "

## 2021-08-29 NOTE — PROGRESS NOTES
Patient admitted to unit to room 322 from L&D. Received report from Letha FIGUEREDO. Assumed care of patient. Assessment complete. Fundus is firm, at the umbilicus, with light lochia rubra. Patient states pain is increasing and would like some pain medication. Will release orders and medicate per PRN meds. Patient and FOB oriented to room and procedures, emergency light, call bell,  identification badges, and to call for assistance to bathroom. Patient and FOB verbalized understanding, all questions addressed and answered. Bed is locked and in low position. Call light left within reach and encouraged to call for any needs if necessary.

## 2021-08-29 NOTE — ANESTHESIA PREPROCEDURE EVALUATION
27yo  at 40 3/4 weeks, here for IOL for post dates. In a lot of pain and requesting epidural for pain control    Hx of low plt, at 108. Repeat today 98. No ssx bleeding.     Relevant Problems   NEURO   (positive) History of PCOS   (positive) History of thrombocytopenia- plt 113       Physical Exam    Airway   Mallampati: II  TM distance: >3 FB  Neck ROM: full       Cardiovascular - normal exam  Rhythm: regular  Rate: normal  (-) murmur     Dental - normal exam           Pulmonary - normal exam  Breath sounds clear to auscultation     Abdominal    Neurological - normal exam                 Anesthesia Plan    ASA 2       Plan - epidural   Neuraxial block will be labor analgesia                  Pertinent diagnostic labs and testing reviewed    Informed Consent:    Anesthetic plan and risks discussed with patient.

## 2021-08-29 NOTE — CARE PLAN
The patient is Stable - Low risk of patient condition declining or worsening    Shift Goals  Clinical Goals: vitals stable, able to void, pain control    Progress made toward(s) clinical / shift goals:  vitals stable. Patient has voided since transfer to unit - gait steady. Pain controlled with PRN meds and heat packs.     Patient is not progressing towards the following goals:

## 2021-08-29 NOTE — PROGRESS NOTES
CAMILA RN mistakenly ordered labs on mom and not baby. Order was canceled but the labs were already drawn. Spoke with lab not to run the samples.

## 2021-08-29 NOTE — PROGRESS NOTES
-rcvd report from kiarra RN and assumed care of pt.  Labs weren't drawn. Ordered stat for epidural.  Platelets 98  -Dr. Townsend at bedside for epidural placement.placed without incidence.  -Joanna Sweeney and Melissa Urbano at bedside to see pt. SVE=8/90/-1  Pt comfortable with epidural. [place] cath placed.  -C. Benton at bedside for variable decel. SVE=complete/-1 will labor down.  - of viable baby boy with 8/8 apgars

## 2021-08-29 NOTE — NON-PROVIDER
Delivery Note for Vaginal Delivery     Stage 1:  26 y.o. y/o  at 40w3d weeks admitted induction of labor for post dates. Her pregnancy has been complicated by cholelithiasis and anemia. Her labor progressed with SROM and pitocin.  The patient was noted to be GBS positive and treated with penicillin. Fetal monitoring during labor was overall category I. Patient had an epidural for pain control.     Stage II: At , she was noted to be complete.  She labored down for 30  Minutes then pushed for 6 minutes to deliver a  viable, vigorous male infant in the MELVI position on 2021 at 2155.  The delivery was uncomplicated. Shoulders were delivered easily.  The infant was placed immediately upon mother’s abdomen. Apgars at 1 and 5 minutes were 8/8 and the infant has not yet been weighed.    Stage III: Attention was then turned to active management of the third stage. Pitocin was started via IV bolus. The placenta was delivered spontaneously with gentle manual traction on the umbilical cord with countertraction maintained suprapubically.  On inspection, the placenta was intact and had normal insertion.  Upon delivery of the placenta, good hemostasis was noted with fundal massage and a 40 unit bolus of pitocin in IV infusion was continued per protocol.     Laceration repair: The perineum was inspected following delivery. The patient did not have any lacerations.    Estimated blood loss for the above procedures was 150 ml.     Mother and infant in stable condition, and family pleased with birth.     Joanna Sweeney, Student Nurse Midwife.

## 2021-08-30 VITALS
RESPIRATION RATE: 16 BRPM | TEMPERATURE: 98 F | OXYGEN SATURATION: 99 % | HEIGHT: 57 IN | HEART RATE: 79 BPM | DIASTOLIC BLOOD PRESSURE: 71 MMHG | WEIGHT: 175 LBS | SYSTOLIC BLOOD PRESSURE: 112 MMHG | BODY MASS INDEX: 37.76 KG/M2

## 2021-08-30 PROCEDURE — 99999 PR NO CHARGE: CPT | Performed by: OBSTETRICS & GYNECOLOGY

## 2021-08-30 PROCEDURE — 700102 HCHG RX REV CODE 250 W/ 637 OVERRIDE(OP): Performed by: NURSE PRACTITIONER

## 2021-08-30 PROCEDURE — A9270 NON-COVERED ITEM OR SERVICE: HCPCS | Performed by: NURSE PRACTITIONER

## 2021-08-30 RX ADMIN — ACETAMINOPHEN 325 MG: 325 TABLET, FILM COATED ORAL at 05:07

## 2021-08-30 RX ADMIN — IBUPROFEN 800 MG: 800 TABLET, FILM COATED ORAL at 13:46

## 2021-08-30 RX ADMIN — IBUPROFEN 800 MG: 800 TABLET, FILM COATED ORAL at 01:34

## 2021-08-30 RX ADMIN — ACETAMINOPHEN 325 MG: 325 TABLET, FILM COATED ORAL at 13:47

## 2021-08-30 ASSESSMENT — EDINBURGH POSTNATAL DEPRESSION SCALE (EPDS)
I HAVE BLAMED MYSELF UNNECESSARILY WHEN THINGS WENT WRONG: NO, NEVER
THINGS HAVE BEEN GETTING ON TOP OF ME: NO, I HAVE BEEN COPING AS WELL AS EVER
I HAVE BEEN SO UNHAPPY THAT I HAVE BEEN CRYING: NO, NEVER
I HAVE BEEN ANXIOUS OR WORRIED FOR NO GOOD REASON: NO, NOT AT ALL
I HAVE LOOKED FORWARD WITH ENJOYMENT TO THINGS: AS MUCH AS I EVER DID
THE THOUGHT OF HARMING MYSELF HAS OCCURRED TO ME: NEVER
I HAVE FELT SCARED OR PANICKY FOR NO GOOD REASON: NO, NOT MUCH
I HAVE BEEN ABLE TO LAUGH AND SEE THE FUNNY SIDE OF THINGS: AS MUCH AS I ALWAYS COULD
I HAVE BEEN SO UNHAPPY THAT I HAVE HAD DIFFICULTY SLEEPING: NOT AT ALL
I HAVE FELT SAD OR MISERABLE: NO, NOT AT ALL

## 2021-08-30 NOTE — PROGRESS NOTES
Pt assessed, fundus firm, lochia light. No s/s of distress. Bonding well w/ infant. Visits NBN. Fob @ bs, pt up and voiding w/o difficulty. Denies pain at this time, will monitor.

## 2021-08-30 NOTE — CARE PLAN
The patient is Stable - Low risk of patient condition declining or worsening    Shift Goals  Clinical Goals: vitals stable, able to void, pain control    Progress made toward(s) clinical / shift goals:  Patients baby under the oxygen thompson, eased anxiety by answering questions. Educated on infants condition. Patient is now caring independently for infant in her room.    Patient is not progressing towards the following goals:

## 2021-08-30 NOTE — PROGRESS NOTES
@1925: Report taken from Kandy FIGUEREDO. Pt in stable condition. No questions or concerns at this time. Care taken over.  @0000: Mom resting, no questions or concerns at this time.  @0500: Mom request formula due to concern that baby is not getting enough milk. Similac provided.  @0530: Infant taken for  screen.  @0722: Report given to Carley FIGUEREDO. Pt in stable condition. No questions or concerns at this time. Care turned over.

## 2021-08-30 NOTE — DISCHARGE INSTRUCTIONS
PATIENT DISCHARGE EDUCATION INSTRUCTION SHEET  REASONS TO CALL YOUR OBSTETRICIAN  · Persistent fever, shaking, chills (Temperature higher than 100.4) may indicate you have an infection  · Heavy bleeding: soaking more than 1 pad per hour; Passing clots an egg-sized clot or bigger may mean you have an postpartum hemorrhage  · Foul odor from vagina or bad smelling or discolored discharge or blood  · Breast infection (Mastitis symptoms); breast pain, chills, fever, redness or red streaks, may feel flu like symptoms  · Urinary pain, burning or frequency  · Incision that is not healing, increased redness, swelling, tenderness or pain, or any pus from episiotomy or  site may mean you have an infection  · Redness, swelling, warmth, or painful to touch in the calf area of your leg may mean you have a blood clot  · Severe or intensified depression, thoughts or feelings of wanting to hurt yourself or someone else   · Pain in chest, obstructed breathing or shortness of breath (trouble catching your breath) may mean you are having a postpartum complication. Call your provider immediately   · Headache that does not get better, even after taking medicine, a bad headache with vision changes or pain in the upper right area of your belly may mean you have high blood pressure or post birth preeclampsia. Call your provider immediately    HAND WASHING  All family and friends should wash their hands:  · Before and after holding the baby  · Before feeding the baby  · After using the restroom or changing the baby's diaper    WOUND CARE  Ask your physician for additional care instructions. In general:  ·  Incision:  · May shower and pat incision dry   · Keep the incision clean and dry  · There should not be any opening or pus from the incision  · Continue to walk at home 3 times a day   · Do NOT lift anything heavier than your baby (over 10 pounds)  · Encourage family to help participate in care of the  to allow  rest and mom time to heal  · Episiotomy/Laceration  · May use reynaldo-spray bottle, witch hazel pads and dermaplast spray for comfort  · Use reynaldo-spray bottle after urinating to cleanse perineal area  · To prevent burning during urination spray reynaldo-water bottle on labial area   · Pat perineal area dry until episiotomy/laceration is healed  · Continue to use reynaldo-bottle until bleeding stops as needed  · If have a 2nd degree laceration or greater, a Sitz bath can offer relief from soreness, burning, and inflammation   · Sitz Bath   · Sit in 6 inches of warm water and soak laceration as needed until the laceration heals    VAGINAL CARE AND BLEEDING  · Nothing inside vagina for 6 weeks:   · No sexual intercourse, tampons or douching  · Bleeding may continue for 2-4 weeks. Amount and color may vary  · Soaking 1 pad or more in an hour for several hours is considered heavy bleeding  · Passing large egg sized blood clots can be concerning  · If you feel like you have heavy bleeding or are having increasing amount of blood clots call your Obstetrician immediately  · If you begin feeling faint upon standing, feeling sick to your stomach, have clammy skin, a really fast heartbeat, have chills, start feeling confused, dizzy, sleepy or weak, or feeling like you're going to faint call your Obstetrician immediately    HYPERTENSION   Preeclampsia or gestational hypertension are types of high blood pressure that only pregnant women can get. It is important for you to be aware of symptoms to seek early intervention and treatment. If you have any of these symptoms immediately call your Obstetrician    · Vision changes or blurred vision   · Severe headache or pain that is unrelieved with medication and will not go away  · Persistent pain in upper abdomen or shoulder   · Increased swelling of face, feet, or hands  · Difficulty breathing or shortness of breath at rest  · Urinating less than usual    URINATION AND BOWEL MOVEMENTS  · Eating  "more fiber (bran cereal, fruits, and vegetables) and drinking plenty of fluids will help to avoid constipation  · Urinary frequency and urgency after childbirth is normal  · If you experience any urinary pain, burning or frequency call your provider    BIRTH CONTROL  · It is possible to become pregnant at any time after delivery and while breastfeeding  · Plan to discuss a method of birth control with your physician at your post delivery follow up visit    POSTPARTUM BLUES  During the first few days after birth, you may experience a sense of the \"blues\" which may include impatience, irritability or even crying. These feelings come and go quickly. However, as many as 1 in 10 women experience emotional symptoms known as postpartum depression.     POSTPARTUM DEPRESSION    May start as early as the second or third day after delivery or take several weeks or months to develop. Symptoms of \"blues\" are present, but are more intense: Crying spells; loss of appetite; feelings of hopelessness or loss of control; fear of touching the baby; over concern or no concern at all about the baby; little or no concern about your own appearance/caring for yourself; and/or inability to sleep or excessive sleeping. Contact your Obstetrician if you are experiencing any of these symptoms     PREVENTING SHAKEN BABY  If you are angry or stressed, PUT THE BABY IN THE CRIB, step away, take some deep breaths, and wait until you are calm to care for the baby. DO NOT SHAKE THE BABY. You are not alone, call a supporter for help.  · [facility] 24/7 crisis call line (544-865-6831) or (1-274.861.8776)  · You can also text them, text \"ANSWER\" (535500)      "

## 2021-08-30 NOTE — DISCHARGE SUMMARY
POSTPARTUM    PROGRESS  NOTE;    PATIENT ID:  NAME:  Nataliia Falk  MRN:               [id number]  YOB: 1995    Admission date; 2021  Discharge date; 2021     26 y.o. female  at 40w3d PPD#2 s/p     Subjective: No complaints    Objective:    Vitals:    21 1000 21 1500 21 1800 21 2200   BP: (!) 92/66 109/63 (!) 95/69 112/67   Pulse: 84 92 93 87   Resp: 18 18 16 16   Temp: 36.2 °C (97.1 °F) 36.4 °C (97.5 °F) 36.9 °C (98.4 °F) 36.6 °C (97.8 °F)   TempSrc: Temporal Temporal Temporal Temporal   SpO2: 99% 98% 98% 98%   Weight:       Height:         General: No acute distress, resting comfortably in bed.  HEENT: normocephalic, nontraumatic, PERRLA, EOMI  Cardiovascular: Heart RRR with no murmurs, rubs or gallops. Distal Pulses 2+  Respiratory: symmetric chest expansion, lungs CTA bilaterally with no wheezes rales or rhonci  Abdomen: soft, mildly tender, fundus firm, +BS  Genitourinary: lochia light, denies excessive vaginal bleeding  Musculoskeletal: strength 5/5 in four extremities  Neuro: non focal with no numbness, tingling or changes in sensation    Recent Labs     21  1908 21  1037   WBC 15.3* 12.5*   RBC 3.87* 3.30*   HEMOGLOBIN 12.5 10.7*   HEMATOCRIT 37.5 32.4*   MCV 96.9 98.2*   MCH 32.3 32.4   RDW 49.3 50.4*   PLATELETCT 98* 87*   MPV 13.8* 14.2*   NEUTSPOLYS 79.70*  --    LYMPHOCYTES 14.40*  --    MONOCYTES 5.00  --    EOSINOPHILS 0.30  --    BASOPHILS 0.10  --      No results for input(s): SODIUM, POTASSIUM, CHLORIDE, CO2, GLUCOSE, BUN, CPKTOTAL in the last 72 hours.    Current Meds:   Current Facility-Administered Medications   Medication Dose Frequency Provider Last Rate Last Admin   • oxytocin (PITOCIN) infusion (for postpartum)   mL/hr Continuous Anu Martin, A.P.R.N.       • ibuprofen (MOTRIN) tablet 800 mg  800 mg Q8HRS PRN Anu Martin, A.P.R.N.   800 mg at 21 0134   • acetaminophen (Tylenol) tablet  325 mg  325 mg Q4HRS PRN LIZA RamirezP.R.N.   325 mg at 21 0507   • HYDROcodone-acetaminophen (NORCO) 5-325 MG per tablet 1 Tablet  1 Tablet Q4HRS PRN REI Ramirez.P.R.N.   1 Tablet at 21 2108   • HYDROcodone/acetaminophen (NORCO)  MG per tablet 1 Tablet  1 Tablet Q4HRS PRN REI Ramirez.P.R.N.   1 Tablet at 21 0614   • LR infusion   PRN CARINA Barrow.N.P.       • PRN oxytocin (PITOCIN) (20 Units/1000 mL) PRN for excessive uterine bleeding - See Admin Instr  125-999 mL/hr Once PRN CARINA Barrow.N.P.       • miSOPROStol (CYTOTEC) tablet 600 mcg  600 mcg Once PRN CARINA Barrow.N.P.       • docusate sodium (COLACE) capsule 100 mg  100 mg BID PRN CARINA Barrow.N.P.   100 mg at 21 0240   • bisacodyl (DULCOLAX) suppository 10 mg  10 mg PRN Melissa Urbano, CARINA.N.P.       • oxytocin (PITOCIN) 20 UNITS/1000ML LR (induction of labor)  0.5-20 lisa-units/min Continuous Anu Martin A.P.R.N. 125 mL/hr at 21 2245 41.667 lisa-units/min at 21 2245   • lactated ringers infusion   Continuous Gerald Toro D.O. 125 mL/hr at 21 1445 New Bag at 21 1445   Last reviewed on 2021 12:29 AM by Letha Pedro R.N.       Assessment:  26 y.o. female  at 40w3d PPD#2 s/p     Plan:   1. Routine care  2. Discharge home today      Johnnie Fish MD

## 2021-08-30 NOTE — PROGRESS NOTES
Discharge instructions and education reviewed with patient.  All questions/ concerns addressed.  Pt verbalized understanding.  Discharge paperwork signed.  Pt released in no apparent distress.  Patient escorted via wheelchair by RN to privately owned vehicle driven by her mother.

## 2021-08-30 NOTE — DISCHARGE SUMMARY
Discharge Summary      Date of Admission: 2021  Date of Discharge: 2021    Admission Dx: Indication for care in labor or delivery [O75.9],   Patient Active Problem List    Diagnosis Date Noted   • Cholelithiasis affecting pregnancy, antepartum 2021   • History of thrombocytopenia-6/ plt 113 2021   • History of PCOS 2021   • Anemia 10/16/2012   • Supervision of other normal pregnancy, antepartum 2012        Discharge Dx: S/P spontaneous vaginal delivery    Delivery Date: 2021    Past Medical History:   Diagnosis Date   • Anemia 10/16/2012   • Decreased platelet count (HCC) 2012   • Pregnancy      No past surgical history on file.  OB History    Para Term  AB Living   2 2 2     2   SAB TAB Ectopic Molar Multiple Live Births           0 2      # Outcome Date GA Lbr Memo/2nd Weight Sex Delivery Anes PTL Lv   2 Term 21 40w3d / 00:22 3.52 kg (7 lb 12.2 oz) M Vag-Spont EPI N TAYLOR   1 Term 12   3.204 kg (7 lb 1 oz) F Vag-Spont   TAYLOR      Birth Comments: System Generated. Please review and update pregnancy details.     Allergies: Patient has no known allergies.    Hospital Course:   26 y.o. now , who presented for induction of labor due to postdates.  Pt was admitted for induction labor and underwent vaginal, spontaneous.  Pregnancy complicated by  normal cholestasis cholelithiasis and anemia patient postpartum course was unremarkable, with progressive advancement in diet, ambulation and toleration of oral analgesia. Patient without complaints today and desires discharge.     Vitals:    21 1500 21 1800 21 2200 21 0600   BP: 109/63 (!) 95/69 112/67 112/71   Pulse: 92 93 87 79   Resp: 18 16 16 16   Temp: 36.4 °C (97.5 °F) 36.9 °C (98.4 °F) 36.6 °C (97.8 °F) 36.7 °C (98 °F)   TempSrc: Temporal Temporal Temporal Temporal   SpO2: 98% 98% 98% 99%   Weight:       Height:         Exam:  Gen: AAO, NAD  Abdomen soft, non-tender.  BS normal. No masses,  No organomegaly  Incisionnone  Fundus Tenderness: no, Below umbilicus: Yes,   ExtremitiesNormal    Meds:   No current facility-administered medications on file prior to encounter.     Current Outpatient Medications on File Prior to Encounter   Medication Sig Dispense Refill   • Prenatal Vit-Fe Fumarate-FA (PRENATAL 1+1 PO) Take  by mouth.         Labs:   Recent Results (from the past 168 hour(s))   SARS-CoV-2 PCR (24 hour In-House): Collect NP swab in VTM    Collection Time: 08/23/21  5:45 PM    Specimen: Nasopharyngeal; Respirate   Result Value Ref Range    SARS-CoV-2 Source NP Swab     SARS-CoV-2 by PCR NotDetected    CBC WITH DIFFERENTIAL    Collection Time: 08/28/21  7:08 PM   Result Value Ref Range    WBC 15.3 (H) 4.8 - 10.8 K/uL    RBC 3.87 (L) 4.20 - 5.40 M/uL    Hemoglobin 12.5 12.0 - 16.0 g/dL    Hematocrit 37.5 37.0 - 47.0 %    MCV 96.9 81.4 - 97.8 fL    MCH 32.3 27.0 - 33.0 pg    MCHC 33.3 (L) 33.6 - 35.0 g/dL    RDW 49.3 35.9 - 50.0 fL    Platelet Count 98 (L) 164 - 446 K/uL    MPV 13.8 (H) 9.0 - 12.9 fL    Neutrophils-Polys 79.70 (H) 44.00 - 72.00 %    Lymphocytes 14.40 (L) 22.00 - 41.00 %    Monocytes 5.00 0.00 - 13.40 %    Eosinophils 0.30 0.00 - 6.90 %    Basophils 0.10 0.00 - 1.80 %    Immature Granulocytes 0.50 0.00 - 0.90 %    Nucleated RBC 0.00 /100 WBC    Neutrophils (Absolute) 12.17 (H) 2.00 - 7.15 K/uL    Lymphs (Absolute) 2.20 1.00 - 4.80 K/uL    Monos (Absolute) 0.76 0.00 - 0.85 K/uL    Eos (Absolute) 0.05 0.00 - 0.51 K/uL    Baso (Absolute) 0.02 0.00 - 0.12 K/uL    Immature Granulocytes (abs) 0.08 0.00 - 0.11 K/uL    NRBC (Absolute) 0.00 K/uL   HOLD BLOOD BANK SPECIMEN (NOT TESTED)    Collection Time: 08/28/21  7:08 PM   Result Value Ref Range    Holding Tube - Bb DONE    Blood Culture,Hold    Collection Time: 08/29/21  1:41 AM   Result Value Ref Range    Blood Culture Hold Collected    CBC without differential    Collection Time: 08/29/21 10:37 AM   Result Value Ref  Range    WBC 12.5 (H) 4.8 - 10.8 K/uL    RBC 3.30 (L) 4.20 - 5.40 M/uL    Hemoglobin 10.7 (L) 12.0 - 16.0 g/dL    Hematocrit 32.4 (L) 37.0 - 47.0 %    MCV 98.2 (H) 81.4 - 97.8 fL    MCH 32.4 27.0 - 33.0 pg    MCHC 33.0 (L) 33.6 - 35.0 g/dL    RDW 50.4 (H) 35.9 - 50.0 fL    Platelet Count 87 (L) 164 - 446 K/uL    MPV 14.2 (H) 9.0 - 12.9 fL         normal postpartum course  No heavy bleeding or foul vaginal discharge       Discharge Instructions:  Discharge to home  Pelvic rest x6 weeks  Call or come to ED for: heavy vaginal bleeding, fever >100.4, severe abdominal pain, severe headache, chest pain, shortness of breath,  N/V, foul smelling vaginal discharge, or other concerns.  Follow up: .[facility] in 5 weeks for vaginal delivery         Medication List      CONTINUE taking these medications      Instructions   PRENATAL 1+1 PO   Take  by mouth.        STOP taking these medications    ondansetron 4 MG Tbdp  Commonly known as: Zofran ODT     pantoprazole 20 MG tablet  Commonly known as: PROTONIX     ursodiol 300 MG Caps  Commonly known as: HARRY Chong M.D.

## 2021-08-30 NOTE — PROGRESS NOTES
At approximately this time after mother went to NBN to attempt breast feeding, infant was able to come out to room with mother.

## 2021-09-08 ENCOUNTER — APPOINTMENT (OUTPATIENT)
Dept: RADIOLOGY | Facility: MEDICAL CENTER | Age: 26
DRG: 769 | End: 2021-09-08
Attending: NURSE PRACTITIONER
Payer: COMMERCIAL

## 2021-09-08 ENCOUNTER — HOSPITAL ENCOUNTER (INPATIENT)
Facility: MEDICAL CENTER | Age: 26
LOS: 2 days | DRG: 769 | End: 2021-09-11
Attending: OBSTETRICS & GYNECOLOGY | Admitting: OBSTETRICS & GYNECOLOGY
Payer: COMMERCIAL

## 2021-09-08 ENCOUNTER — HOSPITAL ENCOUNTER (EMERGENCY)
Facility: MEDICAL CENTER | Age: 26
End: 2021-09-08
Payer: COMMERCIAL

## 2021-09-08 DIAGNOSIS — Z98.890 POSTOPERATIVE STATE: ICD-10-CM

## 2021-09-08 LAB
APPEARANCE UR: CLEAR
BACTERIA #/AREA URNS HPF: NEGATIVE /HPF
BASOPHILS # BLD AUTO: 0.3 % (ref 0–1.8)
BASOPHILS # BLD: 0.04 K/UL (ref 0–0.12)
BILIRUB UR QL STRIP.AUTO: NEGATIVE
COLOR UR: YELLOW
EOSINOPHIL # BLD AUTO: 0.04 K/UL (ref 0–0.51)
EOSINOPHIL NFR BLD: 0.3 % (ref 0–6.9)
EPI CELLS #/AREA URNS HPF: ABNORMAL /HPF
ERYTHROCYTE [DISTWIDTH] IN BLOOD BY AUTOMATED COUNT: 43.8 FL (ref 35.9–50)
GLUCOSE UR STRIP.AUTO-MCNC: NEGATIVE MG/DL
HCT VFR BLD AUTO: 37.6 % (ref 37–47)
HGB BLD-MCNC: 12.5 G/DL (ref 12–16)
HYALINE CASTS #/AREA URNS LPF: ABNORMAL /LPF
IMM GRANULOCYTES # BLD AUTO: 0.03 K/UL (ref 0–0.11)
IMM GRANULOCYTES NFR BLD AUTO: 0.3 % (ref 0–0.9)
KETONES UR STRIP.AUTO-MCNC: NEGATIVE MG/DL
LEUKOCYTE ESTERASE UR QL STRIP.AUTO: ABNORMAL
LYMPHOCYTES # BLD AUTO: 1.39 K/UL (ref 1–4.8)
LYMPHOCYTES NFR BLD: 11.8 % (ref 22–41)
MCH RBC QN AUTO: 31.3 PG (ref 27–33)
MCHC RBC AUTO-ENTMCNC: 33.2 G/DL (ref 33.6–35)
MCV RBC AUTO: 94 FL (ref 81.4–97.8)
MICRO URNS: ABNORMAL
MONOCYTES # BLD AUTO: 0.38 K/UL (ref 0–0.85)
MONOCYTES NFR BLD AUTO: 3.2 % (ref 0–13.4)
NEUTROPHILS # BLD AUTO: 9.93 K/UL (ref 2–7.15)
NEUTROPHILS NFR BLD: 84.1 % (ref 44–72)
NITRITE UR QL STRIP.AUTO: NEGATIVE
NRBC # BLD AUTO: 0 K/UL
NRBC BLD-RTO: 0 /100 WBC
PH UR STRIP.AUTO: 5.5 [PH] (ref 5–8)
PLATELET # BLD AUTO: 180 K/UL (ref 164–446)
PMV BLD AUTO: 12.3 FL (ref 9–12.9)
PROT UR QL STRIP: NEGATIVE MG/DL
RBC # BLD AUTO: 4 M/UL (ref 4.2–5.4)
RBC # URNS HPF: ABNORMAL /HPF
RBC UR QL AUTO: ABNORMAL
SP GR UR STRIP.AUTO: 1.02
UROBILINOGEN UR STRIP.AUTO-MCNC: 0.2 MG/DL
WBC # BLD AUTO: 11.8 K/UL (ref 4.8–10.8)
WBC #/AREA URNS HPF: ABNORMAL /HPF

## 2021-09-08 PROCEDURE — 0241U HCHG SARS-COV-2 COVID-19 NFCT DS RESP RNA 4 TRGT MIC: CPT

## 2021-09-08 PROCEDURE — 700102 HCHG RX REV CODE 250 W/ 637 OVERRIDE(OP): Performed by: NURSE PRACTITIONER

## 2021-09-08 PROCEDURE — 76856 US EXAM PELVIC COMPLETE: CPT

## 2021-09-08 PROCEDURE — 87086 URINE CULTURE/COLONY COUNT: CPT

## 2021-09-08 PROCEDURE — 36415 COLL VENOUS BLD VENIPUNCTURE: CPT

## 2021-09-08 PROCEDURE — 85025 COMPLETE CBC W/AUTO DIFF WBC: CPT

## 2021-09-08 PROCEDURE — A9270 NON-COVERED ITEM OR SERVICE: HCPCS | Performed by: NURSE PRACTITIONER

## 2021-09-08 PROCEDURE — C9803 HOPD COVID-19 SPEC COLLECT: HCPCS | Performed by: NURSE PRACTITIONER

## 2021-09-08 PROCEDURE — 81001 URINALYSIS AUTO W/SCOPE: CPT

## 2021-09-08 PROCEDURE — 87077 CULTURE AEROBIC IDENTIFY: CPT

## 2021-09-08 RX ORDER — IBUPROFEN 800 MG/1
800 TABLET ORAL ONCE
Status: COMPLETED | OUTPATIENT
Start: 2021-09-08 | End: 2021-09-08

## 2021-09-08 RX ADMIN — IBUPROFEN 800 MG: 800 TABLET, FILM COATED ORAL at 23:01

## 2021-09-08 ASSESSMENT — FIBROSIS 4 INDEX: FIB4 SCORE: 3.14

## 2021-09-09 ENCOUNTER — ANESTHESIA EVENT (OUTPATIENT)
Dept: SURGERY | Facility: MEDICAL CENTER | Age: 26
DRG: 769 | End: 2021-09-09
Payer: COMMERCIAL

## 2021-09-09 ENCOUNTER — ANESTHESIA (OUTPATIENT)
Dept: SURGERY | Facility: MEDICAL CENTER | Age: 26
DRG: 769 | End: 2021-09-09
Payer: COMMERCIAL

## 2021-09-09 ENCOUNTER — APPOINTMENT (OUTPATIENT)
Dept: RADIOLOGY | Facility: MEDICAL CENTER | Age: 26
DRG: 769 | End: 2021-09-09
Attending: ADVANCED PRACTICE MIDWIFE
Payer: COMMERCIAL

## 2021-09-09 LAB
BASOPHILS # BLD AUTO: 0.2 % (ref 0–1.8)
BASOPHILS # BLD: 0.02 K/UL (ref 0–0.12)
EOSINOPHIL # BLD AUTO: 0.02 K/UL (ref 0–0.51)
EOSINOPHIL NFR BLD: 0.2 % (ref 0–6.9)
ERYTHROCYTE [DISTWIDTH] IN BLOOD BY AUTOMATED COUNT: 46.1 FL (ref 35.9–50)
FLUAV RNA SPEC QL NAA+PROBE: NEGATIVE
FLUBV RNA SPEC QL NAA+PROBE: NEGATIVE
HCT VFR BLD AUTO: 29.1 % (ref 37–47)
HGB BLD-MCNC: 9.6 G/DL (ref 12–16)
HOLDING TUBE BB 8507: NORMAL
IMM GRANULOCYTES # BLD AUTO: 0.04 K/UL (ref 0–0.11)
IMM GRANULOCYTES NFR BLD AUTO: 0.4 % (ref 0–0.9)
LYMPHOCYTES # BLD AUTO: 1.43 K/UL (ref 1–4.8)
LYMPHOCYTES NFR BLD: 15.8 % (ref 22–41)
MCH RBC QN AUTO: 32.1 PG (ref 27–33)
MCHC RBC AUTO-ENTMCNC: 33 G/DL (ref 33.6–35)
MCV RBC AUTO: 97.3 FL (ref 81.4–97.8)
MONOCYTES # BLD AUTO: 0.52 K/UL (ref 0–0.85)
MONOCYTES NFR BLD AUTO: 5.7 % (ref 0–13.4)
NEUTROPHILS # BLD AUTO: 7.04 K/UL (ref 2–7.15)
NEUTROPHILS NFR BLD: 77.7 % (ref 44–72)
NRBC # BLD AUTO: 0 K/UL
NRBC BLD-RTO: 0 /100 WBC
PATHOLOGY CONSULT NOTE: NORMAL
PLATELET # BLD AUTO: 166 K/UL (ref 164–446)
PMV BLD AUTO: 12.4 FL (ref 9–12.9)
RBC # BLD AUTO: 2.99 M/UL (ref 4.2–5.4)
RSV RNA SPEC QL NAA+PROBE: NEGATIVE
SARS-COV-2 RNA RESP QL NAA+PROBE: NOTDETECTED
SPECIMEN SOURCE: NORMAL
WBC # BLD AUTO: 9.1 K/UL (ref 4.8–10.8)

## 2021-09-09 PROCEDURE — 700102 HCHG RX REV CODE 250 W/ 637 OVERRIDE(OP): Performed by: NURSE PRACTITIONER

## 2021-09-09 PROCEDURE — 10D17Z9 MANUAL EXTRACTION OF PRODUCTS OF CONCEPTION, RETAINED, VIA NATURAL OR ARTIFICIAL OPENING: ICD-10-PCS | Performed by: OBSTETRICS & GYNECOLOGY

## 2021-09-09 PROCEDURE — 700111 HCHG RX REV CODE 636 W/ 250 OVERRIDE (IP): Performed by: ANESTHESIOLOGY

## 2021-09-09 PROCEDURE — 770002 HCHG ROOM/CARE - OB PRIVATE (112)

## 2021-09-09 PROCEDURE — A9270 NON-COVERED ITEM OR SERVICE: HCPCS | Performed by: ADVANCED PRACTICE MIDWIFE

## 2021-09-09 PROCEDURE — 85025 COMPLETE CBC W/AUTO DIFF WBC: CPT

## 2021-09-09 PROCEDURE — 160041 HCHG SURGERY MINUTES - EA ADDL 1 MIN LEVEL 4: Performed by: OBSTETRICS & GYNECOLOGY

## 2021-09-09 PROCEDURE — 502587 HCHG PACK, D&C: Performed by: OBSTETRICS & GYNECOLOGY

## 2021-09-09 PROCEDURE — 700105 HCHG RX REV CODE 258: Performed by: NURSE PRACTITIONER

## 2021-09-09 PROCEDURE — 160029 HCHG SURGERY MINUTES - 1ST 30 MINS LEVEL 4: Performed by: OBSTETRICS & GYNECOLOGY

## 2021-09-09 PROCEDURE — 88305 TISSUE EXAM BY PATHOLOGIST: CPT

## 2021-09-09 PROCEDURE — 700111 HCHG RX REV CODE 636 W/ 250 OVERRIDE (IP): Performed by: NURSE PRACTITIONER

## 2021-09-09 PROCEDURE — 700105 HCHG RX REV CODE 258: Performed by: OBSTETRICS & GYNECOLOGY

## 2021-09-09 PROCEDURE — 160035 HCHG PACU - 1ST 60 MINS PHASE I: Performed by: OBSTETRICS & GYNECOLOGY

## 2021-09-09 PROCEDURE — 501838 HCHG SUTURE GENERAL: Performed by: OBSTETRICS & GYNECOLOGY

## 2021-09-09 PROCEDURE — A9270 NON-COVERED ITEM OR SERVICE: HCPCS | Performed by: NURSE PRACTITIONER

## 2021-09-09 PROCEDURE — 36415 COLL VENOUS BLD VENIPUNCTURE: CPT

## 2021-09-09 PROCEDURE — 59160 D & C AFTER DELIVERY: CPT | Performed by: OBSTETRICS & GYNECOLOGY

## 2021-09-09 PROCEDURE — 160009 HCHG ANES TIME/MIN: Performed by: OBSTETRICS & GYNECOLOGY

## 2021-09-09 PROCEDURE — 700101 HCHG RX REV CODE 250: Performed by: OBSTETRICS & GYNECOLOGY

## 2021-09-09 PROCEDURE — 700101 HCHG RX REV CODE 250: Performed by: ANESTHESIOLOGY

## 2021-09-09 PROCEDURE — 160036 HCHG PACU - EA ADDL 30 MINS PHASE I: Performed by: OBSTETRICS & GYNECOLOGY

## 2021-09-09 PROCEDURE — 160048 HCHG OR STATISTICAL LEVEL 1-5: Performed by: OBSTETRICS & GYNECOLOGY

## 2021-09-09 PROCEDURE — 700102 HCHG RX REV CODE 250 W/ 637 OVERRIDE(OP): Performed by: ADVANCED PRACTICE MIDWIFE

## 2021-09-09 PROCEDURE — 160002 HCHG RECOVERY MINUTES (STAT): Performed by: OBSTETRICS & GYNECOLOGY

## 2021-09-09 RX ORDER — SODIUM CHLORIDE, SODIUM LACTATE, POTASSIUM CHLORIDE, CALCIUM CHLORIDE 600; 310; 30; 20 MG/100ML; MG/100ML; MG/100ML; MG/100ML
INJECTION, SOLUTION INTRAVENOUS CONTINUOUS
Status: DISCONTINUED | OUTPATIENT
Start: 2021-09-09 | End: 2021-09-10

## 2021-09-09 RX ORDER — HYDROCODONE BITARTRATE AND ACETAMINOPHEN 5; 325 MG/1; MG/1
1-2 TABLET ORAL EVERY 6 HOURS PRN
Status: DISCONTINUED | OUTPATIENT
Start: 2021-09-09 | End: 2021-09-10

## 2021-09-09 RX ORDER — CEFAZOLIN SODIUM 1 G/3ML
INJECTION, POWDER, FOR SOLUTION INTRAMUSCULAR; INTRAVENOUS PRN
Status: DISCONTINUED | OUTPATIENT
Start: 2021-09-09 | End: 2021-09-09 | Stop reason: SURG

## 2021-09-09 RX ORDER — OXYCODONE HCL 5 MG/5 ML
10 SOLUTION, ORAL ORAL
Status: DISCONTINUED | OUTPATIENT
Start: 2021-09-09 | End: 2021-09-09 | Stop reason: HOSPADM

## 2021-09-09 RX ORDER — MIDAZOLAM HYDROCHLORIDE 1 MG/ML
INJECTION INTRAMUSCULAR; INTRAVENOUS PRN
Status: DISCONTINUED | OUTPATIENT
Start: 2021-09-09 | End: 2021-09-09 | Stop reason: SURG

## 2021-09-09 RX ORDER — MISOPROSTOL 200 UG/1
600 TABLET ORAL
Status: DISCONTINUED | OUTPATIENT
Start: 2021-09-09 | End: 2021-09-11

## 2021-09-09 RX ORDER — SODIUM CHLORIDE, SODIUM LACTATE, POTASSIUM CHLORIDE, AND CALCIUM CHLORIDE .6; .31; .03; .02 G/100ML; G/100ML; G/100ML; G/100ML
1000 INJECTION, SOLUTION INTRAVENOUS
Status: DISCONTINUED | OUTPATIENT
Start: 2021-09-09 | End: 2021-09-09

## 2021-09-09 RX ORDER — VITAMIN A ACETATE, BETA CAROTENE, ASCORBIC ACID, CHOLECALCIFEROL, .ALPHA.-TOCOPHEROL ACETATE, DL-, THIAMINE MONONITRATE, RIBOFLAVIN, NIACINAMIDE, PYRIDOXINE HYDROCHLORIDE, FOLIC ACID, CYANOCOBALAMIN, CALCIUM CARBONATE, FERROUS FUMARATE, ZINC OXIDE, CUPRIC OXIDE 3080; 12; 120; 400; 1; 1.84; 3; 20; 22; 920; 25; 200; 27; 10; 2 [IU]/1; UG/1; MG/1; [IU]/1; MG/1; MG/1; MG/1; MG/1; MG/1; [IU]/1; MG/1; MG/1; MG/1; MG/1; MG/1
1 TABLET, FILM COATED ORAL
Status: DISCONTINUED | OUTPATIENT
Start: 2021-09-09 | End: 2021-09-11 | Stop reason: HOSPADM

## 2021-09-09 RX ORDER — DIPHENHYDRAMINE HYDROCHLORIDE 50 MG/ML
12.5 INJECTION INTRAMUSCULAR; INTRAVENOUS
Status: DISCONTINUED | OUTPATIENT
Start: 2021-09-09 | End: 2021-09-09 | Stop reason: HOSPADM

## 2021-09-09 RX ORDER — DOCUSATE SODIUM 100 MG/1
100 CAPSULE, LIQUID FILLED ORAL 2 TIMES DAILY PRN
Status: DISCONTINUED | OUTPATIENT
Start: 2021-09-09 | End: 2021-09-11 | Stop reason: HOSPADM

## 2021-09-09 RX ORDER — IBUPROFEN 800 MG/1
800 TABLET ORAL 3 TIMES DAILY PRN
Status: DISCONTINUED | OUTPATIENT
Start: 2021-09-09 | End: 2021-09-11 | Stop reason: HOSPADM

## 2021-09-09 RX ORDER — ONDANSETRON 2 MG/ML
INJECTION INTRAMUSCULAR; INTRAVENOUS PRN
Status: DISCONTINUED | OUTPATIENT
Start: 2021-09-09 | End: 2021-09-09 | Stop reason: SURG

## 2021-09-09 RX ORDER — ONDANSETRON 2 MG/ML
4 INJECTION INTRAMUSCULAR; INTRAVENOUS
Status: DISCONTINUED | OUTPATIENT
Start: 2021-09-09 | End: 2021-09-09 | Stop reason: HOSPADM

## 2021-09-09 RX ORDER — ACETAMINOPHEN 325 MG/1
650 TABLET ORAL EVERY 4 HOURS PRN
Status: DISCONTINUED | OUTPATIENT
Start: 2021-09-09 | End: 2021-09-11 | Stop reason: HOSPADM

## 2021-09-09 RX ORDER — OXYCODONE HCL 5 MG/5 ML
5 SOLUTION, ORAL ORAL
Status: DISCONTINUED | OUTPATIENT
Start: 2021-09-09 | End: 2021-09-09 | Stop reason: HOSPADM

## 2021-09-09 RX ORDER — HALOPERIDOL 5 MG/ML
1 INJECTION INTRAMUSCULAR
Status: DISCONTINUED | OUTPATIENT
Start: 2021-09-09 | End: 2021-09-09 | Stop reason: HOSPADM

## 2021-09-09 RX ORDER — SODIUM CHLORIDE, SODIUM LACTATE, POTASSIUM CHLORIDE, CALCIUM CHLORIDE 600; 310; 30; 20 MG/100ML; MG/100ML; MG/100ML; MG/100ML
INJECTION, SOLUTION INTRAVENOUS CONTINUOUS
Status: DISCONTINUED | OUTPATIENT
Start: 2021-09-09 | End: 2021-09-09 | Stop reason: HOSPADM

## 2021-09-09 RX ORDER — CLINDAMYCIN PHOSPHATE 900 MG/50ML
900 INJECTION, SOLUTION INTRAVENOUS EVERY 8 HOURS
Status: DISCONTINUED | OUTPATIENT
Start: 2021-09-09 | End: 2021-09-09 | Stop reason: HOSPADM

## 2021-09-09 RX ORDER — DEXAMETHASONE SODIUM PHOSPHATE 4 MG/ML
INJECTION, SOLUTION INTRA-ARTICULAR; INTRALESIONAL; INTRAMUSCULAR; INTRAVENOUS; SOFT TISSUE PRN
Status: DISCONTINUED | OUTPATIENT
Start: 2021-09-09 | End: 2021-09-09 | Stop reason: SURG

## 2021-09-09 RX ORDER — CLINDAMYCIN PHOSPHATE 900 MG/50ML
900 INJECTION, SOLUTION INTRAVENOUS EVERY 8 HOURS
Status: DISCONTINUED | OUTPATIENT
Start: 2021-09-09 | End: 2021-09-10

## 2021-09-09 RX ORDER — CLINDAMYCIN PHOSPHATE 150 MG/ML
INJECTION, SOLUTION INTRAVENOUS PRN
Status: DISCONTINUED | OUTPATIENT
Start: 2021-09-09 | End: 2021-09-09 | Stop reason: SURG

## 2021-09-09 RX ORDER — ROPIVACAINE HYDROCHLORIDE 2 MG/ML
INJECTION, SOLUTION EPIDURAL; INFILTRATION; PERINEURAL CONTINUOUS
Status: DISCONTINUED | OUTPATIENT
Start: 2021-09-09 | End: 2021-09-09

## 2021-09-09 RX ORDER — SODIUM CHLORIDE, SODIUM LACTATE, POTASSIUM CHLORIDE, CALCIUM CHLORIDE 600; 310; 30; 20 MG/100ML; MG/100ML; MG/100ML; MG/100ML
INJECTION, SOLUTION INTRAVENOUS PRN
Status: DISCONTINUED | OUTPATIENT
Start: 2021-09-09 | End: 2021-09-11 | Stop reason: HOSPADM

## 2021-09-09 RX ORDER — SODIUM CHLORIDE, SODIUM LACTATE, POTASSIUM CHLORIDE, AND CALCIUM CHLORIDE .6; .31; .03; .02 G/100ML; G/100ML; G/100ML; G/100ML
250 INJECTION, SOLUTION INTRAVENOUS PRN
Status: DISCONTINUED | OUTPATIENT
Start: 2021-09-09 | End: 2021-09-09

## 2021-09-09 RX ADMIN — MIDAZOLAM HYDROCHLORIDE 2 MG: 1 INJECTION, SOLUTION INTRAMUSCULAR; INTRAVENOUS at 03:33

## 2021-09-09 RX ADMIN — CLINDAMYCIN IN 5 PERCENT DEXTROSE 900 MG: 18 INJECTION, SOLUTION INTRAVENOUS at 14:53

## 2021-09-09 RX ADMIN — PROPOFOL 150 MG: 10 INJECTION, EMULSION INTRAVENOUS at 03:33

## 2021-09-09 RX ADMIN — CEFAZOLIN 2 G: 330 INJECTION, POWDER, FOR SOLUTION INTRAMUSCULAR; INTRAVENOUS at 03:34

## 2021-09-09 RX ADMIN — DEXAMETHASONE SODIUM PHOSPHATE 4 MG: 4 INJECTION, SOLUTION INTRA-ARTICULAR; INTRALESIONAL; INTRAMUSCULAR; INTRAVENOUS; SOFT TISSUE at 03:39

## 2021-09-09 RX ADMIN — SODIUM CHLORIDE, POTASSIUM CHLORIDE, SODIUM LACTATE AND CALCIUM CHLORIDE: 600; 310; 30; 20 INJECTION, SOLUTION INTRAVENOUS at 02:15

## 2021-09-09 RX ADMIN — GENTAMICIN SULFATE 290 MG: 40 INJECTION, SOLUTION INTRAMUSCULAR; INTRAVENOUS at 07:22

## 2021-09-09 RX ADMIN — FENTANYL CITRATE 100 MCG: 50 INJECTION, SOLUTION INTRAMUSCULAR; INTRAVENOUS at 03:33

## 2021-09-09 RX ADMIN — HYDROCODONE BITARTRATE AND ACETAMINOPHEN 2 TABLET: 5; 325 TABLET ORAL at 23:17

## 2021-09-09 RX ADMIN — ACETAMINOPHEN 650 MG: 325 TABLET, FILM COATED ORAL at 21:38

## 2021-09-09 RX ADMIN — FENTANYL CITRATE 100 MCG: 50 INJECTION, SOLUTION INTRAMUSCULAR; INTRAVENOUS at 03:39

## 2021-09-09 RX ADMIN — IBUPROFEN 800 MG: 800 TABLET, FILM COATED ORAL at 21:38

## 2021-09-09 RX ADMIN — PRENATAL WITH FERROUS FUM AND FOLIC ACID 1 TABLET: 3080; 920; 120; 400; 22; 1.84; 3; 20; 10; 1; 12; 200; 27; 25; 2 TABLET ORAL at 14:53

## 2021-09-09 RX ADMIN — CLINDAMYCIN PHOSPHATE 900 MG: 150 INJECTION, SOLUTION INTRAMUSCULAR; INTRAVENOUS at 03:41

## 2021-09-09 RX ADMIN — CLINDAMYCIN IN 5 PERCENT DEXTROSE 900 MG: 18 INJECTION, SOLUTION INTRAVENOUS at 21:38

## 2021-09-09 RX ADMIN — ONDANSETRON 4 MG: 2 INJECTION INTRAMUSCULAR; INTRAVENOUS at 03:33

## 2021-09-09 ASSESSMENT — PAIN DESCRIPTION - PAIN TYPE
TYPE: ACUTE PAIN

## 2021-09-09 ASSESSMENT — PAIN SCALES - GENERAL: PAIN_LEVEL: 0

## 2021-09-09 NOTE — CONSULTS
Met with MOB for an initial lactation visit.  MOB re-admitted for endometritis and has not breast fed and/or pumped for approximately 11 hours per RN, Kandy Linares.  Infant brought to bedside by FOB so that MOB can breastfeed.  MOB reported she has been exclusively breastfeeding through out the day, but does offer infant formula at night.  She reported she has been breastfeeding at the right breast only and pumping at the left breast per pediatrician instructions.  MOB stated she is unsure why she was provided with this instruction by pediatrician as infant can latch on both breasts without difficulty.    Breast assessment performed.  Both breasts are full with firm pockets of milk felt on the outer breast tissue of both breasts.  Areolas remain pliable.  Engorgement not present.  Milk observed leaking from each breast as breast assessment was performed.  Nipples remain intact with no signs of tissue damage present.  Infant put to the left breast in the cross cradle position.  Deep latch achieved immediately and milk observed at corners of infant's mouth.  Recommended for MOB to pump x 1 after this breastfeeding session to help soften breasts further.  She was then advised to breastfeed per feeding cues only.    OSIEL was provided with a hospital grade breast pump and pumping kit by RN.  MOB stated she was familiar with pump operation and cleaning of pump parts and declined assistance with pump operation or cleaning of pump parts.  ADRIÁN quickly reviewed pump settings with MOB.    MOB verbalized understanding of all information provided to her and denied having any further lactaiton questions and/or concerns at this time.  Encouraged MOB to call for lactation assistance as needed.

## 2021-09-09 NOTE — PROGRESS NOTES
"Pharmacy Gentamicin Kinetics Note for 9/9/2021     26 y.o. female on Gentamicin day #  1    Gentamicin Indication: Maternal fever     Provider specified end date: 09/12/21    Active Antibiotics (From admission, onward)    Ordered     Ordering Provider       Thu Sep 9, 2021  2:59 AM    09/09/21 0259  gentamicin (GARAMYCIN) 290 mg in  mL IVPB  EVERY 24 HOURS         Delia Mohan C.N.M.       Thu Sep 9, 2021  2:52 AM    09/09/21 0252  clindamycin (CLEOCIN) IVPB premix 900 mg  EVERY 8 HOURS         Delia Mohan C.N.M.    09/09/21 0252  MD Alert...Gentamicin per Pharmacy  PHARMACY TO DOSE        Question:  Indication(s) for aminoglycoside?  Answer:  Intra-abdominal infection    Delia Mohan C.N.M.          Dosing Weight: 58 kg (127 lb 13.9 oz)    Admission History: Admitted on 9/8/2021 for Pregnancy   Pertinent history: Pt admitted for worsening abdominal pain. Postpartum day 12 with fever, tachycardia, and fundal tenderness. Tmax 103.4degF    Allergies:     Patient has no known allergies.     Pertinent cultures to date:      Results     Procedure Component Value Units Date/Time    COV-2, FLU A/B, AND RSV BY PCR (2-4 HOURS [employer]): Collect NP swab in VTM [[id number]] Collected: 09/08/21 2245    Order Status: Completed Specimen: Respirate from Nasopharyngeal Updated: 09/09/21 0127     Influenza virus A RNA Negative     Influenza virus B, PCR Negative     RSV, PCR Negative     SARS-CoV-2 by PCR NotDetected     Comment: PATIENTS: Important information regarding your results and instructions can  be found at [email or web address]   \"After your  Covid-19 Test\"    [facility] providers: PLEASE REFER TO DE-ESCALATION AND RETESTING PROTOCOL  on [email or web address]    **The [facility] GeneXpert Xpress SARS-CoV-2 RT-PCR Test has been made  available for use under the Emergency Use Authorization (EUA) only.          SARS-CoV-2 Source NP Swab    Narrative:      Collected By:[id number] RAJIV Novoa " "you been in close contact with a person who is suspected  or known to be positive for COVID-19 within the last 30 days  (e.g. last seen that person < 30 days ago)->No    URINALYSIS [[id number]]  (Abnormal) Collected: 21    Order Status: Completed Specimen: Urine, Clean Catch Updated: 21 231     Color Yellow     Character Clear     Specific Gravity 1.025     Ph 5.5     Glucose Negative mg/dL      Ketones Negative mg/dL      Protein Negative mg/dL      Bilirubin Negative     Urobilinogen, Urine 0.2     Nitrite Negative     Leukocyte Esterase Small     Occult Blood Large     Micro Urine Req Microscopic    Narrative:      Collected By:[id number] RAJIV TANG  Indication for culture:->Patient WITHOUT an indwelling Salmon  catheter in place with new onset of Dysuria, Frequency,  Urgency, and/or Suprapubic pain    URINE CULTURE(NEW) [[id number]] Collected: 21    Order Status: Completed Specimen: Urine, Clean Catch Updated: 21    Narrative:      Collected By:[id number] RAJIV TANG  Indication for culture:->Patient WITHOUT an indwelling Salmon  catheter in place with new onset of Dysuria, Frequency,  Urgency, and/or Suprapubic pain          Labs:    CrCl cannot be calculated (Patient's most recent lab result is older than the maximum 7 days allowed.).  Recent Labs     21  230   WBC 11.8*   NEUTSPOLYS 84.10*     No results for input(s): BUN, CREATININE, ALBUMIN in the last 72 hours.  No results for input(s): GENTTROUGH, GENTPEAK, GENTRANDOM in the last 72 hours.  No intake or output data in the 24 hours ending 21 0308  /82   Pulse (!) 112   Temp (!) 38.2 °C (100.7 °F) (Oral)   Resp 17   Ht 1.448 m (4' 9.01\")   Wt 69.9 kg (154 lb)   SpO2 98%  Temp (24hrs), Av.7 °C (101.7 °F), Min:38.2 °C (100.7 °F), Max:39.7 °C (103.4 °F)      List concerns for Gentamicin clearance:     None    A/P:     - Gentamicin dose: 290 mg iv q24h     - Next gentamicin level: TBD by rounding " pharmacist should abx be continued.    - Goal trough: Undetectable    - Comments:  Pharmacy will continue to follow and recommend de-escalation of antibiotics as appropriate.        Dolly Roe, PharmD

## 2021-09-09 NOTE — H&P
History and Physical    Nataliia Falk is a 26 y.o. female  -Para:     Gestational Age:  40w3d  Admitted for:   Postpartum complications  Admitted to  [facility] Delivery.  [facility]    HPI: Patient is admitted with the above mentioned Chief Complaint and States she has has abdominal pain since delivery, but that the pain has been getting worse and she could tolerate it anymore.  The pain is similar to what she experienced with breast feeding following delivery but has acutely worsened in the past day.  It has become unbearable tonight so she came in.       Denies any abnormal discharge, bleeding, or odor. She states she has been lightly bleeding for the last few days. She states she has had a fever throughout the day. Has taken tylenol with little relief.    Patient denies fever, chills, nausea, vomiting , headache, visual disturbance, or dysuria  Patient's last menstrual period was 2020 (exact date).  Estimated Date of Delivery: 21  Final EDWIGE: 2021, by Last Menstrual Period    Patient Active Problem List    Diagnosis Date Noted   • Cholelithiasis affecting pregnancy, antepartum 2021   • History of thrombocytopenia- plt 113 2021   • History of PCOS 2021   • Anemia 10/16/2012   • Supervision of other normal pregnancy, antepartum 2012       Admitting DX: Postpartum fever and tachycardia with uterine tenderness    History:   has a past medical history of Anemia (10/16/2012), Decreased platelet count (HCC) (2012), and Pregnancy. She also has no past medical history of Addisons disease ([facility]), Adrenal disorder (HCC), Allergy, Anxiety, Arrhythmia, Arthritis, Asthma, Blood transfusion, Cancer (HCC), Cataract, CHF (congestive heart failure) ([facility]), Clotting disorder (HCC), COPD, Cushings syndrome ([facility]), Depression, Diabetes (HCC), Diabetic neuropathy ([facility]), GERD (gastroesophageal reflux disease),  "Glaucoma, Goiter, Head ache, Headache(784.0), Heart attack (HCC), Heart murmur, HIV (human immunodeficiency virus infection), Hyperlipidemia, Hypertension, IBD (inflammatory bowel disease), Kidney disease, Meningitis, Migraine, Muscle disorder, OSTEOPOROSIS, Parathyroid disorder (HCC), Pituitary disease (HCC), Pulmonary emphysema (HCC), Seizure (HCC), Sickle cell disease (HCC), Stroke (HCC), Substance abuse (HCC), Thyroid disease, Tuberculosis, Ulcer, or Urinary tract infection, site not specified.     has no past surgical history on file.    OB History    Para Term  AB Living   2 2 2     2   SAB TAB Ectopic Molar Multiple Live Births           0 2      # Outcome Date GA Lbr Memo/2nd Weight Sex Delivery Anes PTL Lv   2 Term 21 40w3d / 00:22 3.52 kg (7 lb 12.2 oz) M Vag-Spont EPI N TAYLOR   1 Term 12   3.204 kg (7 lb 1 oz) F Vag-Spont   TAYLOR      Birth Comments: System Generated. Please review and update pregnancy details.       Medications:  No current facility-administered medications on file prior to encounter.     Current Outpatient Medications on File Prior to Encounter   Medication Sig Dispense Refill   • Prenatal Vit-Fe Fumarate-FA (PRENATAL 1+1 PO) Take  by mouth.         Allergies:  Patient has no known allergies.    ROS:   Neuro: negative    Cardiovascular: negative  Gastro intestinal: negative  Genitourinary: negative            Physical Exam:  /82   Pulse (!) 112   Temp (!) 38.2 °C (100.7 °F) (Oral)   Resp 17   Ht 1.448 m (4' 9.01\")   Wt 69.9 kg (154 lb)   LMP 2020 (Exact Date)   SpO2 98%   BMI 33.32 kg/m²   Constitutional: healthy-appearing, Well-developed, well-nourished, in moderately distress  No JVD: while supine  HEENT: Neck supple with midline trachea  Breast Exam: Inspection negative. No nipple discharge or bleeding. No masses or nodularity palpable, positive for engorgement related to current breastfeeding status  Cardio: regular rate and rhythm  Lung: " unlabored respirations, no intercostal retractions or accessory muscle use, clear to auscultation without rales or wheezes  Abdomen: marked and involuntary guarding tenderness in the lower abdomen and fundal area  Extremity: extremities, peripheral pulses and reflexes normal, no edema, redness or tenderness in the calves or thighs, Homans sign is negative, no sign of DVT, feet normal, good pulses, normal color, temperature and sensation    Fundus firm at U-2 with marked tenderness on palpation. Fundus feels to be deviated to the left      Labs:  Recent Labs     09/08/21  2301   WBC 11.8*   RBC 4.00*   HEMOGLOBIN 12.5   HEMATOCRIT 37.6   MCV 94.0   MCH 31.3   MCHC 33.2*   RDW 43.8   PLATELETCT 180   MPV 12.3     Prenatal Results     General (Most Recent Result)     Test Value Date Time    ABO  O  03/16/21 1115    Rh  POS  03/16/21 1115    Antibody screen  NEG  03/16/21 1115    HbA1c       Chlamydia by PCR  Negative  02/16/21 1218    Gonorrhea by PCR  Negative  02/16/21 1218    RPR/Syphilus  Non-Reactive  06/08/21 1346    HSV 1/2 by PCR (non-serum)       HSV 1/2 (serum)       HSV 1       HSV 2       HPV (16)  Negative  05/03/19 1000    HBsAg  Non-Reactive  03/16/21 1115    HIV-1 HIV-2 Antibodies  Non-Reactive  03/16/21 1115    Rubella  49.00 IU/mL 03/16/21 1115    Tb             Pap Smear (Most Recent Result)     Test Value Date Time    Pap smear       Pap smear w/HPV       Pap smear w/CTNG       Pap smar w/HPV CTNG  (See Report)   05/03/19 1000    Pap smear (reflex HPV ACUS)       Pap smear (reflex HPV ASCUS w/CTNG)       Pathology gyn specimen  (See Report)   02/16/21 1218          Urinalysis (Most Recent Result)     Test Value Date Time    Urinalysis   Abnormal    (See Report)   09/08/21 2245    POC urinalysis       Urine drug screen (w/ conf)       Urine culture ([id number])  (See Report)   09/08/21 2245    Urine Protein/Creatinine Ratio             Urinalysis, Culture if indicated     Test Value Date Time     Color  Yellow  09/08/21 2245    Appearance  Clear  09/08/21 2245    Specific Gravity  1.025  09/08/21 2245    PH  5.5  09/08/21 2245    Glucose  Negative mg/dL 09/08/21 2245    Ketones  Negative mg/dL 09/08/21 2245    Protein  Negative mg/dL 09/08/21 2245    Bilirubin  Negative  09/08/21 2245    Nitrites  Negative  09/08/21 2245    Leukocytes Esterase  Small  09/08/21 2245    Blood  Large  09/08/21 2245    Comment  Microscopic  09/08/21 2245    Culture  No UA Culture 11/15/16 0314          Urine Drug Screen     Test Value Date Time    Amphetamines  Negative  11/15/16 0344    Barbiturates  Negative  11/15/16 0344    Benzodiazepines  Negative  11/15/16 0344    Cocaine  Negative  11/15/16 0344    Methadone  Negative  11/15/16 0344    Opiates  Positive  11/15/16 0344    Oxycodone  Negative  11/15/16 0344    Phencylidine  Negative  11/15/16 0344    Propoxyphene  Negative  11/15/16 0344    Marijuana Metabolite  Negative  11/15/16 0344          1st Trimester     Test Value Date Time    Hgb       Hct       Fasting Glucose Tolerance       GTT, 1 hour       GTT, 2 hours       GTT, 3 hours             2nd Trimester     Test Value Date Time    Hgb  11.8 g/dL 05/17/21 2023       12.0 g/dL 05/09/21 0503       13.6 g/dL 03/16/21 1115    Hct  35.9 % 05/17/21 2023       35.1 % 05/09/21 0503       40.0 % 03/16/21 1115    AST  122 U/L 05/17/21 2023       183 U/L 05/09/21 0503    ALT  79 U/L 05/17/21 2023       99 U/L 05/09/21 0503    Uric Acid  3.7 mg/dL 05/17/21 2023    Fasting Glucose Tolerance       GTT, 1 hour       GTT, 2 hours       GTT, 3 hours             3rd Trimester     Test Value Date Time    Hgb  12.5 g/dL 09/08/21 2301       10.7 g/dL 08/29/21 1037       12.5 g/dL 08/28/21 1908       11.7 g/dL 06/17/21 0555       12.4 g/dL 06/08/21 1346    Hct  37.6 % 09/08/21 2301       32.4 % 08/29/21 1037       37.5 % 08/28/21 1908       34.9 % 06/17/21 0555       37.2 % 06/08/21 1346    Platelet count  180 K/uL 09/08/21 2301       87  K/uL 21 1037       98 K/uL 21 1908       109 K/uL 21 0555       113 K/uL 21 1346    GBS (BLANKENSHIP BROTH)  POSITIVE  21 0859    Fasting Glucose Tolerance       GTT, 1 hour  93 mg/dL 21 1346    GTT, 2 hous       GTT, 3hours             Congenital Disease Screening     Test Value Date Time    First Trimester Screen       Quad Screen       BH Electrophoresis       Cystic Fibrosis Carrier Study       SMA       AFP Maternal Serum        AFP Tetra       NIPT             Legend    ^: Historical                          Assessment:    Labor State:   Postpartum day 12 with fever, tachycardia, and fundal tenderness  Risk Factors:   n/a  Pregnancy Complications: None     2021 with no lacerations and no placental abnormalities. EBL was 150.  CBC on discharge with WBC of 12.5 and H&H of 10.7/32.4  Today, WBC is 11.8 with H&H of 12.5/37.6    Temp on admit was 101.5F with max temp of 103.4F. Ibuprofen was given for pain and fever at 2301. Last temp was 100.7 at 0220.  She has been persistently tachycardic averaging 105-120 BPM.    Patient Active Problem List    Diagnosis Date Noted   • Cholelithiasis affecting pregnancy, antepartum 2021   • History of thrombocytopenia-6/8 plt 113 2021   • History of PCOS 2021   • Anemia 10/16/2012   • Supervision of other normal pregnancy, antepartum 2012     Labs and UA were WNL  US with unclear etiology, but possible RPOC. Exam and review of images, it is highly likely that this may be the cause of her fever, pain, and tachycardia.    Plan:   Admitted for: D&C with IV antibiotics  Surgical D&C  CBC, UA and hold clot  routine urinalysis- no infection noted  Endometritis: will start IVF and plan for Gent/Clinda for at least 24hrs   Retained POCs: plan for suction D&C    Will await OR team  Plan to transfer to  after procedure for continued maternal and infant care    Delia Mohan C.N.M.    Dr Cyr is the surgeon/attending  today

## 2021-09-09 NOTE — OR NURSING
0845-pt has been very sleepy but arouses to verbal stimuli, pt has not wanted anything to drink, denies pain, reynaldo pad has just spots of bloody drainage, VSS

## 2021-09-09 NOTE — OR NURSING
S/p D&C for remain POC after birth 1 week ago. RASS 0. VSS. Pt on RA. Pt alert and oriented x4. Pt denies pain or nausea at this time. Mary pad in place. No blood noted to pad.    Attempted to call pts  but he did not answer.

## 2021-09-09 NOTE — ANESTHESIA TIME REPORT
Anesthesia Start and Stop Event Times     Date Time Event    9/9/2021 0305 Ready for Procedure     0328 Anesthesia Start     0411 Anesthesia Stop        Responsible Staff  09/09/21    Name Role Begin End    Amilcar Frias M.D. Anesth 0328 0411        Preop Diagnosis (Free Text):  Pre-op Diagnosis     Endometritis        Preop Diagnosis (Codes):    Post op Diagnosis  Retained products of conception after delivery without hemorrhage      Premium Reason  B. 1st Call    Comments:

## 2021-09-09 NOTE — LACTATION NOTE
"Returned to room to assess the fullness of MOB's breasts following recent breastfeeding session.  Breasts felt softer, but were only partially emptied.  MOB stated infant became full \"fast.\"  Firm pockets of milk no longer present on either breast.  This LC asked MOB if she was able to pump following breastfeeding session, but she stated she was feeling tired and wanted to nap first before pumping.    MOB encouraged to feed back expressed breast milk to infant or have it stored in  nursery to be fed to infant at a later time.  MOB instructed to date and time stored breast milk.    MOB encouraged to feed infant 8 or more times in a 24 hour period.  She was encouraged to wake infant up within 3 hours from the previous feed a few times throughout the day instead of 4 so that he gets at least 8 feeds daily.  MOB was also encouraged to breastfeed with time restrictions.    MOB verbalized understanding of all information provided to her and denied having any lactation questions and/or concerns at this time. Encouraged MOB to call for lactation assistance as needed.  "

## 2021-09-09 NOTE — ANESTHESIA PREPROCEDURE EVALUATION
Relevant Problems   NEURO   (positive) History of PCOS   (positive) History of thrombocytopenia-6/8 plt 113       Physical Exam    Airway   Mallampati: II  TM distance: >3 FB  Neck ROM: full       Cardiovascular - normal exam  Rhythm: regular  Rate: normal  (-) murmur     Dental - normal exam           Pulmonary - normal exam  Breath sounds clear to auscultation     Abdominal    Neurological - normal exam                 Anesthesia Plan    ASA 2- EMERGENT   ASA physical status emergent criteria: acute hemorrhage and acute deteriorating condition due to infection    Plan - general       Airway plan will be LMA          Induction: intravenous    Postoperative Plan: Postoperative administration of opioids is intended.    Pertinent diagnostic labs and testing reviewed    Informed Consent:    Anesthetic plan and risks discussed with patient.    Use of blood products discussed with: patient whom consented to blood products.

## 2021-09-09 NOTE — PROGRESS NOTES
2228-Pt presents to L&D c/o fever, body aches, chills, lower abdominal tenderness and back pain that started today. Pt delivered on 8/28/21 vaginally. VS taken.  2232-Phoned Delia MCCLAIN & Joanna SNOW, updated on pt arrival/complaint/status & elevated temperature, orders for COVID swab, provider to see pt at bedside  2240-Delia MCCLAIN and Joanna SNOW at bedside, POC discussed. Additional labs & US ordered  2318-US tech at bedside  0135-Phoned Delai MCCLAIN, updated on negative COVID/Flu/RSV results, will consult with Dr. Cyr  0150-Dr. Cyr at bedside, POC discussed, pt will need D&C due to retained products & endometritis   0240-Report given to main OR RN, transport arranged to transfer pt to main OR. Pt will recover on postpartum  0310-Pt transferred via gurney to preop

## 2021-09-09 NOTE — OR NURSING
Reynaldo pad and mesh underwear changed. Small amount of blood noted to reynaldo pad. No clots noted.

## 2021-09-09 NOTE — ANESTHESIA PROCEDURE NOTES
Airway    Date/Time: 9/9/2021 3:34 AM  Performed by: Amilcar Frias M.D.  Authorized by: Amilcar Frias M.D.     Location:  OR  Urgency:  Elective  Difficult Airway: No    Indications for Airway Management:  Anesthesia      Spontaneous Ventilation: absent    Sedation Level:  Deep  Preoxygenated: Yes    Mask Difficulty Assessment:  0 - not attempted  Final Airway Type:  Supraglottic airway  Final Supraglottic Airway:  Standard LMA    SGA Size:  4  Number of Attempts at Approach:  1  Number of Other Approaches Attempted:  0

## 2021-09-09 NOTE — OP REPORT
PREOPERATIVE DIAGNOSES:  Retained products of conception  Endometritis    POSTOPERATIVE DIAGNOSES:  same    PROCEDURE:  Suction D and C under ultrasound guidance    SURGEON:  Belinda Cyr DO    ASSISTANT:  Delia Mohan CNM      FINDINGS:  Fundal height at umbilicus prior to procedure.  On ultrasound endometrial lining measures 2.6cm with heterogenous echogenicity c/w POCs.  NEFG, cervical os dilated with some blood extruding.  At conclusion of procedure fundus is just above pubic symphysis and endometrial lining in homogenous measuring 0.5cm.    ANESTHESIA:  General LMA.    URINE OUTPUT:  300 milliliters.    ESTIMATED BLOOD LOSS:  500cc evacuated from uterus w/ POCs    SPECIMENS:  Products of conception    ANTIBIOTICS:  Ancef, clindamycin    COMPLICATIONS:  None.    CONDITION:  Stable to PACU.    PROCEDURE IN DETAIL:  The patient was taken to the operating room where general LMA anesthesia was administered without difficulty. She was placed in the dorsal lithotomy position in the [facility]. A final timeout was performed with the entire OR staff confirming the correct patient and procedure. Exam under anesthesia revealed the findings above. She was prepped and draped in the usual sterile fashion. The bladder was straight catheterized for 300 milliliters of clear yellow urine. A bivalve speculum was placed in the vagina and the anterior lip of the cervix was grasped with a single tooth tenaculum. US was placed on adomen outside sterile field and remainder of procedure was done under ultrasound guidance. Uterus was sounded to 11cm.  Cervix was already dilated and a size 8 curved suction curette was easily advanced into uterine cavity.   Suction device was utilized using #8 flexible curette under 60mmhg pressure, contents of the uterus was evacuated. Multiple passes with the curette were made to evacuate the products of conception from the endometrial cavity. Uterine cri was appreciated throughout the  cavity at the end of the procedure. Uterus clamped down to a normal contour, without bleeding and was firm. Endometrial lining was thin and homogenous on ultrasound. The tenaculum was removed from the cervix and the tenaculum sites were made hemostatic with pressure. The speculum was then removed.    All counts were correct per the OR staff. The patient tolerated the procedure well and was awakened in the supine position. She was taken to the recovery room in stable condition.    MOISES

## 2021-09-09 NOTE — ANESTHESIA POSTPROCEDURE EVALUATION
Patient: Nataliia Falk    Procedure Summary     Date: 09/09/21 Room / Location: [facility] OR 09 / SURGERY [facility]    Anesthesia Start: 0328 Anesthesia Stop: 0411    Procedure: DILATION AND CURETTAGE (N/A Vagina ) Diagnosis: (Endometritis)    Surgeons: Belinda Cyr D.O. Responsible Provider: Amilcar Frias M.D.    Anesthesia Type: general ASA Status: 2 - Emergent          Final Anesthesia Type: general  Last vitals  BP   Blood Pressure: 112/67    Temp   36.5 °C (97.7 °F)    Pulse   93   Resp   18    SpO2   97 %      Anesthesia Post Evaluation    Patient location during evaluation: PACU  Patient participation: complete - patient participated  Level of consciousness: awake and alert  Pain score: 0    Airway patency: patent  Anesthetic complications: no  Cardiovascular status: hemodynamically stable  Respiratory status: acceptable  Hydration status: euvolemic    PONV: none          There were no known complications for this encounter.     Nurse Pain Score: 8 (NPRS)

## 2021-09-10 ENCOUNTER — APPOINTMENT (OUTPATIENT)
Dept: RADIOLOGY | Facility: MEDICAL CENTER | Age: 26
DRG: 769 | End: 2021-09-10
Attending: ADVANCED PRACTICE MIDWIFE
Payer: COMMERCIAL

## 2021-09-10 ENCOUNTER — APPOINTMENT (OUTPATIENT)
Dept: RADIOLOGY | Facility: MEDICAL CENTER | Age: 26
DRG: 769 | End: 2021-09-10
Payer: COMMERCIAL

## 2021-09-10 LAB
ALBUMIN SERPL BCP-MCNC: 3.1 G/DL (ref 3.2–4.9)
ALBUMIN/GLOB SERPL: 1.3 G/DL
ALP SERPL-CCNC: 162 U/L (ref 30–99)
ALT SERPL-CCNC: 287 U/L (ref 2–50)
ANION GAP SERPL CALC-SCNC: 8 MMOL/L (ref 7–16)
AST SERPL-CCNC: 472 U/L (ref 12–45)
BASOPHILS # BLD AUTO: 0.5 % (ref 0–1.8)
BASOPHILS # BLD: 0.03 K/UL (ref 0–0.12)
BILIRUB SERPL-MCNC: 1.4 MG/DL (ref 0.1–1.5)
BUN SERPL-MCNC: 11 MG/DL (ref 8–22)
CALCIUM SERPL-MCNC: 8.1 MG/DL (ref 8.5–10.5)
CHLORIDE SERPL-SCNC: 110 MMOL/L (ref 96–112)
CO2 SERPL-SCNC: 23 MMOL/L (ref 20–33)
CREAT SERPL-MCNC: 0.69 MG/DL (ref 0.5–1.4)
EOSINOPHIL # BLD AUTO: 0.04 K/UL (ref 0–0.51)
EOSINOPHIL NFR BLD: 0.6 % (ref 0–6.9)
ERYTHROCYTE [DISTWIDTH] IN BLOOD BY AUTOMATED COUNT: 45.9 FL (ref 35.9–50)
GLOBULIN SER CALC-MCNC: 2.4 G/DL (ref 1.9–3.5)
GLUCOSE SERPL-MCNC: 104 MG/DL (ref 65–99)
HCT VFR BLD AUTO: 27.5 % (ref 37–47)
HGB BLD-MCNC: 8.9 G/DL (ref 12–16)
IMM GRANULOCYTES # BLD AUTO: 0.03 K/UL (ref 0–0.11)
IMM GRANULOCYTES NFR BLD AUTO: 0.5 % (ref 0–0.9)
LIPASE SERPL-CCNC: 119 U/L (ref 11–82)
LYMPHOCYTES # BLD AUTO: 2.36 K/UL (ref 1–4.8)
LYMPHOCYTES NFR BLD: 36.8 % (ref 22–41)
MCH RBC QN AUTO: 31.2 PG (ref 27–33)
MCHC RBC AUTO-ENTMCNC: 32.4 G/DL (ref 33.6–35)
MCV RBC AUTO: 96.5 FL (ref 81.4–97.8)
MONOCYTES # BLD AUTO: 0.49 K/UL (ref 0–0.85)
MONOCYTES NFR BLD AUTO: 7.6 % (ref 0–13.4)
NEUTROPHILS # BLD AUTO: 3.47 K/UL (ref 2–7.15)
NEUTROPHILS NFR BLD: 54 % (ref 44–72)
NRBC # BLD AUTO: 0 K/UL
NRBC BLD-RTO: 0 /100 WBC
PLATELET # BLD AUTO: 155 K/UL (ref 164–446)
PMV BLD AUTO: 12.8 FL (ref 9–12.9)
POTASSIUM SERPL-SCNC: 3.8 MMOL/L (ref 3.6–5.5)
PROT SERPL-MCNC: 5.5 G/DL (ref 6–8.2)
RBC # BLD AUTO: 2.85 M/UL (ref 4.2–5.4)
SODIUM SERPL-SCNC: 141 MMOL/L (ref 135–145)
WBC # BLD AUTO: 6.4 K/UL (ref 4.8–10.8)

## 2021-09-10 PROCEDURE — 80053 COMPREHEN METABOLIC PANEL: CPT

## 2021-09-10 PROCEDURE — A9270 NON-COVERED ITEM OR SERVICE: HCPCS | Performed by: NURSE PRACTITIONER

## 2021-09-10 PROCEDURE — 83690 ASSAY OF LIPASE: CPT

## 2021-09-10 PROCEDURE — 85025 COMPLETE CBC W/AUTO DIFF WBC: CPT

## 2021-09-10 PROCEDURE — 700111 HCHG RX REV CODE 636 W/ 250 OVERRIDE (IP): Performed by: ADVANCED PRACTICE MIDWIFE

## 2021-09-10 PROCEDURE — 74181 MRI ABDOMEN W/O CONTRAST: CPT

## 2021-09-10 PROCEDURE — 700105 HCHG RX REV CODE 258: Performed by: OBSTETRICS & GYNECOLOGY

## 2021-09-10 PROCEDURE — 700102 HCHG RX REV CODE 250 W/ 637 OVERRIDE(OP): Performed by: NURSE PRACTITIONER

## 2021-09-10 PROCEDURE — 700102 HCHG RX REV CODE 250 W/ 637 OVERRIDE(OP): Performed by: ADVANCED PRACTICE MIDWIFE

## 2021-09-10 PROCEDURE — A9270 NON-COVERED ITEM OR SERVICE: HCPCS | Performed by: ADVANCED PRACTICE MIDWIFE

## 2021-09-10 PROCEDURE — 76705 ECHO EXAM OF ABDOMEN: CPT

## 2021-09-10 PROCEDURE — 700101 HCHG RX REV CODE 250: Performed by: OBSTETRICS & GYNECOLOGY

## 2021-09-10 PROCEDURE — 700111 HCHG RX REV CODE 636 W/ 250 OVERRIDE (IP): Performed by: OBSTETRICS & GYNECOLOGY

## 2021-09-10 PROCEDURE — 36415 COLL VENOUS BLD VENIPUNCTURE: CPT

## 2021-09-10 PROCEDURE — 700105 HCHG RX REV CODE 258: Performed by: NURSE PRACTITIONER

## 2021-09-10 PROCEDURE — 99252 IP/OBS CONSLTJ NEW/EST SF 35: CPT | Performed by: SURGERY

## 2021-09-10 PROCEDURE — 770002 HCHG ROOM/CARE - OB PRIVATE (112)

## 2021-09-10 PROCEDURE — 700111 HCHG RX REV CODE 636 W/ 250 OVERRIDE (IP): Performed by: NURSE PRACTITIONER

## 2021-09-10 RX ORDER — MORPHINE SULFATE 10 MG/ML
4 INJECTION, SOLUTION INTRAMUSCULAR; INTRAVENOUS 3 TIMES DAILY PRN
Status: DISCONTINUED | OUTPATIENT
Start: 2021-09-10 | End: 2021-09-10

## 2021-09-10 RX ORDER — DEXTROSE, SODIUM CHLORIDE, SODIUM LACTATE, POTASSIUM CHLORIDE, AND CALCIUM CHLORIDE 5; .6; .31; .03; .02 G/100ML; G/100ML; G/100ML; G/100ML; G/100ML
INJECTION, SOLUTION INTRAVENOUS CONTINUOUS
Status: DISCONTINUED | OUTPATIENT
Start: 2021-09-10 | End: 2021-09-11 | Stop reason: HOSPADM

## 2021-09-10 RX ORDER — CEFAZOLIN SODIUM 2 G/100ML
2 INJECTION, SOLUTION INTRAVENOUS EVERY 8 HOURS
Status: DISCONTINUED | OUTPATIENT
Start: 2021-09-10 | End: 2021-09-11 | Stop reason: HOSPADM

## 2021-09-10 RX ORDER — IBUPROFEN 800 MG/1
800 TABLET ORAL 3 TIMES DAILY PRN
Qty: 30 TABLET | Refills: 0 | Status: SHIPPED
Start: 2021-09-10 | End: 2021-10-11

## 2021-09-10 RX ORDER — ACETAMINOPHEN 325 MG/1
650 TABLET ORAL EVERY 4 HOURS PRN
Qty: 30 TABLET | Refills: 0 | Status: SHIPPED
Start: 2021-09-10 | End: 2021-10-11

## 2021-09-10 RX ORDER — ONDANSETRON 2 MG/ML
4 INJECTION INTRAMUSCULAR; INTRAVENOUS EVERY 6 HOURS PRN
Status: DISCONTINUED | OUTPATIENT
Start: 2021-09-10 | End: 2021-09-11 | Stop reason: HOSPADM

## 2021-09-10 RX ADMIN — FAMOTIDINE 20 MG: 10 INJECTION INTRAVENOUS at 03:08

## 2021-09-10 RX ADMIN — SODIUM CHLORIDE, SODIUM LACTATE, POTASSIUM CHLORIDE, CALCIUM CHLORIDE AND DEXTROSE MONOHYDRATE: 5; 600; 310; 30; 20 INJECTION, SOLUTION INTRAVENOUS at 18:18

## 2021-09-10 RX ADMIN — ONDANSETRON 4 MG: 2 INJECTION INTRAMUSCULAR; INTRAVENOUS at 03:08

## 2021-09-10 RX ADMIN — CEFAZOLIN SODIUM 2 G: 2 INJECTION, SOLUTION INTRAVENOUS at 21:57

## 2021-09-10 RX ADMIN — CLINDAMYCIN IN 5 PERCENT DEXTROSE 900 MG: 18 INJECTION, SOLUTION INTRAVENOUS at 14:09

## 2021-09-10 RX ADMIN — LIDOCAINE HYDROCHLORIDE 30 ML: 20 SOLUTION OROPHARYNGEAL at 01:31

## 2021-09-10 RX ADMIN — CLINDAMYCIN IN 5 PERCENT DEXTROSE 900 MG: 18 INJECTION, SOLUTION INTRAVENOUS at 07:25

## 2021-09-10 RX ADMIN — GENTAMICIN SULFATE 290 MG: 40 INJECTION, SOLUTION INTRAMUSCULAR; INTRAVENOUS at 08:07

## 2021-09-10 RX ADMIN — PRENATAL WITH FERROUS FUM AND FOLIC ACID 1 TABLET: 3080; 920; 120; 400; 22; 1.84; 3; 20; 10; 1; 12; 200; 27; 25; 2 TABLET ORAL at 08:07

## 2021-09-10 RX ADMIN — MORPHINE SULFATE 4 MG: 10 INJECTION INTRAVENOUS at 03:07

## 2021-09-10 RX ADMIN — IBUPROFEN 800 MG: 800 TABLET, FILM COATED ORAL at 09:52

## 2021-09-10 RX ADMIN — METRONIDAZOLE 500 MG: 500 INJECTION, SOLUTION INTRAVENOUS at 22:28

## 2021-09-10 ASSESSMENT — PAIN DESCRIPTION - PAIN TYPE
TYPE: ACUTE PAIN

## 2021-09-10 NOTE — PROGRESS NOTES
S: Pt is laying in bed upon entry to room with warm pack to right side of abdomen. She reports she continues to have 8/10. Recently vomited up all NSAIDs that were previously prescribed for her pain She reports that she did have a caesar salad earlier in the evening and she is concerned that this aggravated her pain. She was also given GI cocktail which she did not tolerate.  She has know history of gallstones in pregnancy.      O:    Vitals:    21 1800 21 2125 09/10/21 0200 09/10/21 0600   BP: (!) 98/60 108/64 116/75 106/70   Pulse: 86 82 75 86   Resp: 16 18 18 19   Temp: 37.1 °C (98.8 °F) 37.3 °C (99.2 °F) 36.2 °C (97.2 °F) 36.8 °C (98.3 °F)   TempSrc: Temporal Temporal Temporal Temporal   SpO2: 96% 95% 97% 97%   Weight:       Height:               A/P:    1.  PPD #13 s/p   2.  Suspected Endometritis- continue antibiotics per protocol  3.  Cholelithiasis - due to history, GI consultation. At current, will treat pain, nausea, and heartburn. Patient agrees with this plan.   4.  Unsure of D/C plan at this time.     BRAULIO Coffman, SARAHI

## 2021-09-10 NOTE — PROGRESS NOTES
Stable pt.  Pleasant.  No complaints of pain on assessment.  Scant rubra discharge.  Voiding without problems.  Tolerating diet well.  POC care discussed for the day.  Verbalizes understanding.

## 2021-09-10 NOTE — PROGRESS NOTES
Pt assessed,  lochia light. No s/s of distress. Bonding well w/ infant. Fob @ bs, pt up and voided. Denies pain at this time.

## 2021-09-10 NOTE — NON-PROVIDER
S: Called to bedside by Maribeth FIGUEREDO for increase in pain. Patient resting in bed with SO and infant at bedside. Patient reports abdominal pain starting about 30 minutes rated 7/10 on pain scale. Reports pain in umbilical area that is constant. She denies taking anything for pain since her D&C early this AM. Denies increase in bleeding, changes in LOC, dizziness, diaphoresis or other symptoms. She denies breastfeeding when onset of pain occurred. She states prior to this episode her pain was minimal rated 4/10 and she did not need pain medication.     O:    Vitals:    09/09/21 0900 09/09/21 1000 09/09/21 1400 09/09/21 1800   BP: 118/72 (!) 95/54 (!) 98/52 (!) 98/60   Pulse: 70 80 92 86   Resp: 12 16 16 16   Temp:  37 °C (98.6 °F) 37.1 °C (98.7 °F) 37.1 °C (98.8 °F)   TempSrc:  Temporal Temporal Temporal   SpO2: 93% 97% 98% 96%   Weight:       Height:         Fundus firm, 3 below umbilicus. Moderately Tender with palpation. Vaginal bleeding scant. Abdomen non rigid.     New set of VS requested from RN        A/P:    1. Postpartum endometritis  2. Post Op D&C   3.  Acute pain    4.  ***    Joanna Sweeney, Student Nurse Midwife

## 2021-09-10 NOTE — PROGRESS NOTES
Pt ambulates in room, takes sequentials off to use the restroom. Passing gas, denies needs for medication.

## 2021-09-10 NOTE — CARE PLAN
The patient is Stable - Low risk of patient condition declining or worsening    Shift Goals  Clinical Goals: maintain stable VS and tolerable pain level    Progress made toward(s) clinical / shift goals:  States no pain since ms administration    Patient is not progressing towards the following goals:

## 2021-09-10 NOTE — LACTATION NOTE
LC met with patient for follow-up visit, she states baby breast fed well on her right breast approximately three hours ago but would not latch to her left breast, LC encouraged her to attempt to latch baby, the patient had some difficulty latching baby, her left breast was very full but was still pliable, no signs of mastitis noted at this time,drops of milk easily hand expressed, LC provided education/reinforced the importance of emptying both breasts by breastfeeding and/or breast pump use on a frequent schedule, educated on risk of mastitis if milk is not regularly/adequately removed from the breast, instructed not to go longer than three hours between feeding and/or pumping to help down regulate the fullness of her breasts,  LC provided assistance with positioning, encouraged her to turn baby toward her belly to belly, a deep latch with widely-flanged lips and a nutritive suck were noted, she denies pain while breastfeeding, LC encouraged hand expression to soften breast if breasts are too full/baby has difficulty latching    Plan:  Ad scottie breastfeeding as often and long as baby demonstrates feeding cues  For no/suboptimal feeding pump for 15 minutes Q 3 hours    Patient states she has an electric Medela breast pump for home use if needed    She denies having any additional questions or concerns for LC at this time    Encouraged to call for assistance as needed

## 2021-09-10 NOTE — DISCHARGE SUMMARY
Discharge Summary:     Date of Admission: 2021  Date of Discharge: 09/10/21      Admitting diagnosis:    1.  Endometritis    Discharge Diagnosis:   1. Status post D&C secondary to retained products of conception  2.  Endometritis      Past Medical History:   Diagnosis Date   • Anemia 10/16/2012   • Decreased platelet count (HCC) 2012   • Pregnancy      OB History    Para Term  AB Living   2 2 2     2   SAB TAB Ectopic Molar Multiple Live Births           0 2      # Outcome Date GA Lbr Memo/2nd Weight Sex Delivery Anes PTL Lv   2 Term 21 40w3d / 00:22 3.52 kg (7 lb 12.2 oz) M Vag-Spont EPI N TAYLOR   1 Term 12   3.204 kg (7 lb 1 oz) F Vag-Spont   TAYLOR      Birth Comments: System Generated. Please review and update pregnancy details.     Past Surgical History:   Procedure Laterality Date   • DILATION AND CURETTAGE N/A 2021    Procedure: DILATION AND CURETTAGE;  Surgeon: Belinda Cyr D.O.;  Location: SURGERY [facility];  Service: Obstetrics     Patient has no known allergies.    Patient Active Problem List   Diagnosis   • Supervision of other normal pregnancy, antepartum   • Anemia   • History of thrombocytopenia- plt 113   • History of PCOS   • Cholelithiasis affecting pregnancy, antepartum       Hospital Course:   26 y.o. now , was admitted endometritis.  Patient presents to the hospital status post day 12 of vaginal delivery with signs and symptoms consistent with endometritis.  Patient underwent a D&C for the removal of retained products of conception.  Patient was treated with antibiotic regimen and pain control.  Today patient is feeling much better, no fevers or chills, or pain.  Patient desires discharge to home.    Physical Exam:  Temp:  [36.2 °C (97.2 °F)-37.3 °C (99.2 °F)] 36.8 °C (98.3 °F)  Pulse:  [67-92] 86  Resp:  [12-19] 19  BP: ()/(52-78) 106/70  SpO2:  [93 %-98 %] 97 %  Physical Exam  General: Well-appearing, no acute  distress  Chest/Breasts: No visible trauma  Abdomen:  Fundus: Nontender this morning, below level of the umbilicus  Extremities:  calves nontender, nonedematous    Current Facility-Administered Medications   Medication Dose   • ondansetron (ZOFRAN) syringe/vial injection 4 mg  4 mg   • famotidine (PEPCID) injection 20 mg  20 mg   • morphine (pf) 10 mg/mL injection 4 mg  4 mg   • lactated ringers infusion     • gentamicin (GARAMYCIN) 290 mg in  mL IVPB  5 mg/kg (Order-Specific)   • LR infusion     • PRN oxytocin (PITOCIN) (20 Units/1000 mL) PRN for excessive uterine bleeding - See Admin Instr  125-999 mL/hr   • miSOPROStol (CYTOTEC) tablet 600 mcg  600 mcg   • docusate sodium (COLACE) capsule 100 mg  100 mg   • magnesium hydroxide (MILK OF MAGNESIA) suspension 30 mL  30 mL   • prenatal plus vitamin (STUARTNATAL 1+1) 27-1 MG tablet 1 Tablet  1 Tablet   • clindamycin (CLEOCIN) IVPB premix 900 mg  900 mg   • ibuprofen (MOTRIN) tablet 800 mg  800 mg   • acetaminophen (Tylenol) tablet 650 mg  650 mg   • HYDROcodone-acetaminophen (NORCO) 5-325 MG per tablet 1-2 Tablet  1-2 Tablet       Recent Labs     09/08/21  2301 09/09/21  1800   WBC 11.8* 9.1   RBC 4.00* 2.99*   HEMOGLOBIN 12.5 9.6*   HEMATOCRIT 37.6 29.1*   MCV 94.0 97.3   MCH 31.3 32.1   MCHC 33.2* 33.0*   RDW 43.8 46.1   PLATELETCT 180 166   MPV 12.3 12.4         Activity/ Discharge Instructions::   Patient able to discharge home this afternoon if she remains afebrile   Patient instructed to return to the hospital if she notes signs of continued infection including fevers and chills, heart racing, or shortness of breath.  Patient has also been instructed to return if she appreciates increase abdominal pain.  Patient does have a home thermometer, has been instructed to monitor her temperature.    Discharge Meds:   No current outpatient medications on file.       Ailyn Santos M.D. PhD      Patient seen and examined. She reports she had some upper abdominal  pain around midnight, associated with vomiting. She has history of cholelithiasis. Ultrasound ordered, and shows possible biliary obstruction. We will hold discharge until evaluation with GI consult.

## 2021-09-10 NOTE — PROGRESS NOTES
Call placed to Rick San and gave her pt update: GI cocktail given at 0130; pt called now and stated she had vomited in the bathroom. Per pt, it was a lot. Pt states pain is still the same. Telephone orders received to give pt pepcid 20 mg IV and zofran 4 mg IV now. Orders repeated and placed.

## 2021-09-10 NOTE — CARE PLAN
The patient is Stable - Low risk of patient condition declining or worsening    Shift Goals  Clinical Goals: maintain stable VS and tolerable pain level    Progress made toward(s) clinical / shift goals:  pt VSS. PRN pain medications given to patient to try to bring down pain from an 8. Once morphine was given, pain went down to a 0. Pt feeling more comfortable this morning and was able to get some sleep.    Patient is not progressing towards the following goals:

## 2021-09-10 NOTE — PROGRESS NOTES
Post Partum Progress Note    Called to bedside by Maribeth FIGUEREDO for patient report of increased pain.   Patient states her pain has increased in the past hour from 4/10 to 7/10. Pain at umbilicus and suprapubic region. Tender on palpation with no report of increase in bleeding. She denies any changes in LOC, dizziness, diaphoresis or other complaints. Patient has infant at bedside and has been breastfeeding and breast pumping through the day. She has not had anything for pain since her D&C early this AM for suspected RPOC and no current orders for pain medication.     Name:   Nataliia Falk   Date/Time:  9/9/2021 - 9:35 PM  Chief Admitting Dx:  Postpartum endometritis [O86.12]  Delivery Type:  vaginal, spontaneous      Subjective:  Abdominal pain: yes  Ambulating:   yes  Tolerating liquids:  yes  Tolerating food:  yes common adult  Flatus:   yes  BM:    yes  Bleeding:   with a small amount of bleeding  Voiding:   yes  Dizziness:   no  Feeding:   breast    Vitals:    09/09/21 1000 09/09/21 1400 09/09/21 1800 09/09/21 2125   BP: (!) 95/54 (!) 98/52 (!) 98/60 108/64   Pulse: 80 92 86 82   Resp: 16 16 16 18   Temp: 37 °C (98.6 °F) 37.1 °C (98.7 °F) 37.1 °C (98.8 °F) 37.3 °C (99.2 °F)   TempSrc: Temporal Temporal Temporal Temporal   SpO2: 97% 98% 96% 95%   Weight:       Height:         Updated VS requested from RN:  BP: 108/64  HR: 82  T: 99.2  O2: 95%  R 18      Exam:    Abdomen: Abdomen soft, BS normal. No masses,  No organomegaly, positive findings:  tenderness moderate suprapubic, generalized and umbilical, appropriate for post operative pain following D&C.  Fundal Tenderness:  yes  Fundus Firm: yes  Incision: none  Below umbilicus: yes  Lochia: mild  Extremities: Normal     Meds:  Current Facility-Administered Medications   Medication Dose   • lactated ringers infusion     • gentamicin (GARAMYCIN) 290 mg in  mL IVPB  5 mg/kg (Order-Specific)   • LR infusion     • PRN oxytocin (PITOCIN) (20 Units/1000  mL) PRN for excessive uterine bleeding - See Admin Instr  125-999 mL/hr   • miSOPROStol (CYTOTEC) tablet 600 mcg  600 mcg   • docusate sodium (COLACE) capsule 100 mg  100 mg   • magnesium hydroxide (MILK OF MAGNESIA) suspension 30 mL  30 mL   • prenatal plus vitamin (STUARTNATAL 1+1) 27-1 MG tablet 1 Tablet  1 Tablet   • clindamycin (CLEOCIN) IVPB premix 900 mg  900 mg   • ibuprofen (MOTRIN) tablet 800 mg  800 mg   • acetaminophen (Tylenol) tablet 650 mg  650 mg       Labs:   Recent Labs     09/08/21  2301 09/09/21  1800   WBC 11.8* 9.1   RBC 4.00* 2.99*   HEMOGLOBIN 12.5 9.6*   HEMATOCRIT 37.6 29.1*   MCV 94.0 97.3   MCH 31.3 32.1   MCHC 33.2* 33.0*   RDW 43.8 46.1   PLATELETCT 180 166   MPV 12.3 12.4       Assessment:  Chief Admitting Dx:  Postpartum endometritis [O86.12]  Delivery Type:  vaginal, spontaneous      Plan:  IBU and Tylenol orders placed.   Monitor pain and VS   Discussed with patient expectations related to healing. She verbalizes understanding.       NIYA Madison

## 2021-09-10 NOTE — PROGRESS NOTES
Call placed to Rick San CNM and informed her of pt pain. Pt does not have pain medication. Rick MCCLAIN will come up to see pt.

## 2021-09-10 NOTE — CONSULTS
Gastroenterology Consult Note     Date of Consult: 09/10/2021     Reason for consult: Elevated Liver Enzymes, Cholelithiasis     HPI: 26 year old female with past medical history of polycystic ovarian syndrome, cholelithiasis, thrombocytopenia and recently s/p vaginal delivery on 8/28/21 - (GBS (+) - treated with Penicillin upon delivery) no delivery complications but with pregnancy complications of cholelithiasis, anemia admitted on 9/9/21 to Labor and Delivery due to progressively worsening lower abdominal pain accompanied by subjective fever. She was found to have retained products of conception, diagnosed with Endometritis and is s/p D&C on 9/9/21. On this admission, she developed epigastric and RUQ abdominal pain x1 day after eating a salad with ranch dressing. This pain is similar to her previous RUQ pain with gallstones during her pregnancy and is associated with nausea and non-bilious vomiting. Her CMP was pertinent for ALT elevation 472, AST elevation 287, Normocytic Anemia (9.6 hbg, MCV 97.3). A RUQ ultrasound showed hepatomegaly, cholelithiasis without cholecystitis and common bile duct dilation at ~9mm. Her pain improved with morphine.      PMHX:  Past Medical History:   Diagnosis Date   • Anemia 10/16/2012   • Decreased platelet count (HCC) 6/20/2012   • Pregnancy           PSurgHx:   Past Surgical History:   Procedure Laterality Date   • DILATION AND CURETTAGE N/A 9/9/2021    Procedure: DILATION AND CURETTAGE;  Surgeon: Belinda Cyr D.O.;  Location: SURGERY [facility];  Service: Obstetrics        ALLERGIES:Patient has no known allergies.     SocHx:   Social History     Socioeconomic History   • Marital status:      Spouse name: Not on file   • Number of children: Not on file   • Years of education: Not on file   • Highest education level: Not on file   Occupational History   • Not on file   Tobacco Use   • Smoking status: Never Smoker   •  Smokeless tobacco: Never Used   Vaping Use   • Vaping Use: Never used   Substance and Sexual Activity   • Alcohol use: No   • Drug use: Not Currently   • Sexual activity: Yes     Partners: Male   Other Topics Concern   • Not on file   Social History Narrative   • Not on file     Social Determinants of Health     Financial Resource Strain:    • Difficulty of Paying Living Expenses:    Food Insecurity:    • Worried About Running Out of Food in the Last Year:    • Ran Out of Food in the Last Year:    Transportation Needs:    • Lack of Transportation (Medical):    • Lack of Transportation (Non-Medical):    Physical Activity:    • Days of Exercise per Week:    • Minutes of Exercise per Session:    Stress:    • Feeling of Stress :    Social Connections:    • Frequency of Communication with Friends and Family:    • Frequency of Social Gatherings with Friends and Family:    • Attends Je[employer] witness Services:    • Active Member of Clubs or Organizations:    • Attends Club or Organization Meetings:    • Marital Status:    Intimate Partner Violence:    • Fear of Current or Ex-Partner:    • Emotionally Abused:    • Physically Abused:    • Sexually Abused:         FAMHx:   Family History   Problem Relation Age of Onset   • Cancer Paternal Grandmother         breast cancer    • Cancer Paternal Grandfather         ROS:  Constitutional: Reports fevers, no chills, no night sweats, no weight changes  HEENT: no vision or hearing changes, no dry mouth, no change in smell  CARDIO: no palpitations, no orthopnea, no chest pain  PULM: no cough, no shortness of breath  NEURO: no Seizures, no memory impairment, no change in sensation  GI: as above  : no dysuria, no hematuria  HEME: reports anemia, no easy brusing  MUSCULOSKELETAL: no muscle aches, no back pain, no arthritis  PSYCH: no anxiety or depression  SKIN: no rashes     PE:  Vitals:    09/09/21 1800 09/09/21 2125 09/10/21 0200 09/10/21 0600   BP: (!) 98/60 108/64 116/75 106/70   Pulse:  86 82 75 86   Resp: 16 18 18 19   Temp: 37.1 °C (98.8 °F) 37.3 °C (99.2 °F) 36.2 °C (97.2 °F) 36.8 °C (98.3 °F)   TempSrc: Temporal Temporal Temporal Temporal   SpO2: 96% 95% 97% 97%   Weight:       Height:         Gen: AAOx3, NAD, lying in bed  HEENT: PERRL, EOMI, nares patent, Mucous membranes moist  Neck: supple, no cervical or supraclavicular adenopathy  CVS: regular rhythm, normal rate, no MRG  Pulm: CTAB, no crackles  Abd: soft, Nd, mild epigastric tenderness, no guarding or rebound  Ext: no edema, normal sensation  NEURO: grossly normal, no weakness  Skin: warm, no rash  Psych: normal Affect, no anxiety     LABS:  Lab Results   Component Value Date/Time    SODIUM 141 09/10/2021 08:22 AM    POTASSIUM 3.8 09/10/2021 08:22 AM    CHLORIDE 110 09/10/2021 08:22 AM    CO2 23 09/10/2021 08:22 AM    GLUCOSE 104 (H) 09/10/2021 08:22 AM    BUN 11 09/10/2021 08:22 AM    CREATININE 0.69 09/10/2021 08:22 AM      Lab Results   Component Value Date/Time    WBC 6.4 09/10/2021 08:22 AM    RBC 2.85 (L) 09/10/2021 08:22 AM    HEMOGLOBIN 8.9 (L) 09/10/2021 08:22 AM    HEMATOCRIT 27.5 (L) 09/10/2021 08:22 AM    MCV 96.5 09/10/2021 08:22 AM    MCH 31.2 09/10/2021 08:22 AM    MCHC 32.4 (L) 09/10/2021 08:22 AM    MPV 12.8 09/10/2021 08:22 AM    NEUTSPOLYS 54.00 09/10/2021 08:22 AM    LYMPHOCYTES 36.80 09/10/2021 08:22 AM    MONOCYTES 7.60 09/10/2021 08:22 AM    EOSINOPHILS 0.60 09/10/2021 08:22 AM    BASOPHILS 0.50 09/10/2021 08:22 AM    ANISOCYTOSIS 1+ 11/16/2012 05:15 AM        No results found for: PROTHROMBTM, INR   Recent Labs     09/10/21  0822   ASTSGOT 472*   ALTSGPT 287*   TBILIRUBIN 1.4   GLOBULIN 2.4       IMAGING: Reviewed personally and summarized in HPI    Problem List Items Addressed This Visit     None             ASSESSMENT:   26 year old female post-vaginal delivery day 12, admitted on 9/9/21 due to Endometritis, found to have epigastric and right-upper quadrant abdominal pain after eating lunch associated with  nausea and vomiting. Given her history of cholelithiasis, abdominal pain presentation, RUQ US and elevated liver enzymes, choledocholithiasis is on the differential.       PROBLEMS:  1. Cholecystitis  2. Common Bile Duct Dilation - no CBD stone  3. Hepatomegaly  4. Endometritis - followed by primary team    PLAN:   MRCP negative for common bile duct stone, but showing gallbladder wall thickening and pericholecystic fluid concerning for acute cholecystitis. CBD dilation could be due to passing of stone     - IV antibiotics: Would prefer ANCEF/Flagyl although she is already on Gentamicin/Clindamycin for Endometritis  - Consider general surgery consult for Cholecystectomy   - Repeat CMP in the AM    Thank you for this consult.

## 2021-09-11 ENCOUNTER — ANESTHESIA EVENT (OUTPATIENT)
Dept: SURGERY | Facility: MEDICAL CENTER | Age: 26
DRG: 769 | End: 2021-09-11
Payer: COMMERCIAL

## 2021-09-11 ENCOUNTER — ANESTHESIA (OUTPATIENT)
Dept: SURGERY | Facility: MEDICAL CENTER | Age: 26
DRG: 769 | End: 2021-09-11
Payer: COMMERCIAL

## 2021-09-11 VITALS
SYSTOLIC BLOOD PRESSURE: 114 MMHG | DIASTOLIC BLOOD PRESSURE: 75 MMHG | OXYGEN SATURATION: 93 % | BODY MASS INDEX: 33.22 KG/M2 | TEMPERATURE: 98.2 F | HEIGHT: 57 IN | HEART RATE: 80 BPM | WEIGHT: 154 LBS | RESPIRATION RATE: 18 BRPM

## 2021-09-11 LAB
ALBUMIN SERPL BCP-MCNC: 3.2 G/DL (ref 3.2–4.9)
ALBUMIN/GLOB SERPL: 1.4 G/DL
ALP SERPL-CCNC: 159 U/L (ref 30–99)
ALT SERPL-CCNC: 236 U/L (ref 2–50)
ANION GAP SERPL CALC-SCNC: 9 MMOL/L (ref 7–16)
AST SERPL-CCNC: 152 U/L (ref 12–45)
BACTERIA UR CULT: ABNORMAL
BACTERIA UR CULT: ABNORMAL
BASOPHILS # BLD AUTO: 0.7 % (ref 0–1.8)
BASOPHILS # BLD: 0.04 K/UL (ref 0–0.12)
BILIRUB SERPL-MCNC: 0.2 MG/DL (ref 0.1–1.5)
BUN SERPL-MCNC: 13 MG/DL (ref 8–22)
CALCIUM SERPL-MCNC: 8.3 MG/DL (ref 8.5–10.5)
CHLORIDE SERPL-SCNC: 107 MMOL/L (ref 96–112)
CO2 SERPL-SCNC: 25 MMOL/L (ref 20–33)
CREAT SERPL-MCNC: 0.78 MG/DL (ref 0.5–1.4)
EOSINOPHIL # BLD AUTO: 0.18 K/UL (ref 0–0.51)
EOSINOPHIL NFR BLD: 3.2 % (ref 0–6.9)
ERYTHROCYTE [DISTWIDTH] IN BLOOD BY AUTOMATED COUNT: 46.4 FL (ref 35.9–50)
GLOBULIN SER CALC-MCNC: 2.3 G/DL (ref 1.9–3.5)
GLUCOSE SERPL-MCNC: 98 MG/DL (ref 65–99)
HCT VFR BLD AUTO: 29.3 % (ref 37–47)
HGB BLD-MCNC: 9.5 G/DL (ref 12–16)
IMM GRANULOCYTES # BLD AUTO: 0.02 K/UL (ref 0–0.11)
IMM GRANULOCYTES NFR BLD AUTO: 0.4 % (ref 0–0.9)
LIPASE SERPL-CCNC: 65 U/L (ref 11–82)
LYMPHOCYTES # BLD AUTO: 2.64 K/UL (ref 1–4.8)
LYMPHOCYTES NFR BLD: 47.7 % (ref 22–41)
MCH RBC QN AUTO: 31.5 PG (ref 27–33)
MCHC RBC AUTO-ENTMCNC: 32.4 G/DL (ref 33.6–35)
MCV RBC AUTO: 97 FL (ref 81.4–97.8)
MONOCYTES # BLD AUTO: 0.33 K/UL (ref 0–0.85)
MONOCYTES NFR BLD AUTO: 6 % (ref 0–13.4)
NEUTROPHILS # BLD AUTO: 2.33 K/UL (ref 2–7.15)
NEUTROPHILS NFR BLD: 42 % (ref 44–72)
NRBC # BLD AUTO: 0 K/UL
NRBC BLD-RTO: 0 /100 WBC
PLATELET # BLD AUTO: 148 K/UL (ref 164–446)
PMV BLD AUTO: 12.7 FL (ref 9–12.9)
POTASSIUM SERPL-SCNC: 3.7 MMOL/L (ref 3.6–5.5)
PROT SERPL-MCNC: 5.5 G/DL (ref 6–8.2)
RBC # BLD AUTO: 3.02 M/UL (ref 4.2–5.4)
SIGNIFICANT IND 70042: ABNORMAL
SITE SITE: ABNORMAL
SODIUM SERPL-SCNC: 141 MMOL/L (ref 135–145)
SOURCE SOURCE: ABNORMAL
WBC # BLD AUTO: 5.5 K/UL (ref 4.8–10.8)

## 2021-09-11 PROCEDURE — 501838 HCHG SUTURE GENERAL: Performed by: SURGERY

## 2021-09-11 PROCEDURE — 700102 HCHG RX REV CODE 250 W/ 637 OVERRIDE(OP): Performed by: ADVANCED PRACTICE MIDWIFE

## 2021-09-11 PROCEDURE — 80053 COMPREHEN METABOLIC PANEL: CPT

## 2021-09-11 PROCEDURE — 700111 HCHG RX REV CODE 636 W/ 250 OVERRIDE (IP): Performed by: ANESTHESIOLOGY

## 2021-09-11 PROCEDURE — 501572 HCHG TROCAR, SHIELD OBTU 5X100: Performed by: SURGERY

## 2021-09-11 PROCEDURE — 83690 ASSAY OF LIPASE: CPT

## 2021-09-11 PROCEDURE — 700111 HCHG RX REV CODE 636 W/ 250 OVERRIDE (IP)

## 2021-09-11 PROCEDURE — 160002 HCHG RECOVERY MINUTES (STAT): Performed by: SURGERY

## 2021-09-11 PROCEDURE — A9270 NON-COVERED ITEM OR SERVICE: HCPCS | Performed by: SURGERY

## 2021-09-11 PROCEDURE — 700101 HCHG RX REV CODE 250: Performed by: SURGERY

## 2021-09-11 PROCEDURE — A9270 NON-COVERED ITEM OR SERVICE: HCPCS | Performed by: ADVANCED PRACTICE MIDWIFE

## 2021-09-11 PROCEDURE — 501399 HCHG SPECIMAN BAG, ENDO CATC: Performed by: SURGERY

## 2021-09-11 PROCEDURE — 47562 LAPAROSCOPIC CHOLECYSTECTOMY: CPT | Performed by: SURGERY

## 2021-09-11 PROCEDURE — 502571 HCHG PACK, LAP CHOLE: Performed by: SURGERY

## 2021-09-11 PROCEDURE — 160035 HCHG PACU - 1ST 60 MINS PHASE I: Performed by: SURGERY

## 2021-09-11 PROCEDURE — 160009 HCHG ANES TIME/MIN: Performed by: SURGERY

## 2021-09-11 PROCEDURE — 700101 HCHG RX REV CODE 250: Performed by: OBSTETRICS & GYNECOLOGY

## 2021-09-11 PROCEDURE — 700111 HCHG RX REV CODE 636 W/ 250 OVERRIDE (IP): Performed by: OBSTETRICS & GYNECOLOGY

## 2021-09-11 PROCEDURE — 160041 HCHG SURGERY MINUTES - EA ADDL 1 MIN LEVEL 4: Performed by: SURGERY

## 2021-09-11 PROCEDURE — 700102 HCHG RX REV CODE 250 W/ 637 OVERRIDE(OP): Performed by: ANESTHESIOLOGY

## 2021-09-11 PROCEDURE — 88304 TISSUE EXAM BY PATHOLOGIST: CPT

## 2021-09-11 PROCEDURE — 500002 HCHG ADHESIVE, DERMABOND: Performed by: SURGERY

## 2021-09-11 PROCEDURE — 501582 HCHG TROCAR, THRD BLADED: Performed by: SURGERY

## 2021-09-11 PROCEDURE — 700102 HCHG RX REV CODE 250 W/ 637 OVERRIDE(OP): Performed by: SURGERY

## 2021-09-11 PROCEDURE — 700105 HCHG RX REV CODE 258: Performed by: OBSTETRICS & GYNECOLOGY

## 2021-09-11 PROCEDURE — 160029 HCHG SURGERY MINUTES - 1ST 30 MINS LEVEL 4: Performed by: SURGERY

## 2021-09-11 PROCEDURE — A9270 NON-COVERED ITEM OR SERVICE: HCPCS | Performed by: ANESTHESIOLOGY

## 2021-09-11 PROCEDURE — 85025 COMPLETE CBC W/AUTO DIFF WBC: CPT

## 2021-09-11 PROCEDURE — 0FT44ZZ RESECTION OF GALLBLADDER, PERCUTANEOUS ENDOSCOPIC APPROACH: ICD-10-PCS | Performed by: SURGERY

## 2021-09-11 PROCEDURE — 501338 HCHG SHEARS, ENDO: Performed by: SURGERY

## 2021-09-11 PROCEDURE — 36415 COLL VENOUS BLD VENIPUNCTURE: CPT

## 2021-09-11 PROCEDURE — 700101 HCHG RX REV CODE 250: Performed by: ANESTHESIOLOGY

## 2021-09-11 PROCEDURE — 160048 HCHG OR STATISTICAL LEVEL 1-5: Performed by: SURGERY

## 2021-09-11 PROCEDURE — 501568 HCHG TROCAR, BLUNTPORT 12MM: Performed by: SURGERY

## 2021-09-11 PROCEDURE — 501583 HCHG TROCAR, THRD CAN&SEAL 5X100: Performed by: SURGERY

## 2021-09-11 PROCEDURE — 700105 HCHG RX REV CODE 258: Performed by: ANESTHESIOLOGY

## 2021-09-11 RX ORDER — HYDROMORPHONE HYDROCHLORIDE 1 MG/ML
0.1 INJECTION, SOLUTION INTRAMUSCULAR; INTRAVENOUS; SUBCUTANEOUS
Status: DISCONTINUED | OUTPATIENT
Start: 2021-09-11 | End: 2021-09-11 | Stop reason: HOSPADM

## 2021-09-11 RX ORDER — MIDAZOLAM HYDROCHLORIDE 1 MG/ML
INJECTION INTRAMUSCULAR; INTRAVENOUS PRN
Status: DISCONTINUED | OUTPATIENT
Start: 2021-09-11 | End: 2021-09-11 | Stop reason: SURG

## 2021-09-11 RX ORDER — ONDANSETRON 2 MG/ML
INJECTION INTRAMUSCULAR; INTRAVENOUS PRN
Status: DISCONTINUED | OUTPATIENT
Start: 2021-09-11 | End: 2021-09-11 | Stop reason: SURG

## 2021-09-11 RX ORDER — OXYCODONE HYDROCHLORIDE 5 MG/1
5 TABLET ORAL EVERY 4 HOURS PRN
Qty: 12 TABLET | Refills: 0 | Status: SHIPPED | OUTPATIENT
Start: 2021-09-11 | End: 2021-09-16

## 2021-09-11 RX ORDER — OXYCODONE HYDROCHLORIDE 5 MG/1
5-10 TABLET ORAL
Status: DISCONTINUED | OUTPATIENT
Start: 2021-09-11 | End: 2021-09-11 | Stop reason: HOSPADM

## 2021-09-11 RX ORDER — LABETALOL HYDROCHLORIDE 5 MG/ML
5 INJECTION, SOLUTION INTRAVENOUS
Status: DISCONTINUED | OUTPATIENT
Start: 2021-09-11 | End: 2021-09-11 | Stop reason: HOSPADM

## 2021-09-11 RX ORDER — LIDOCAINE HYDROCHLORIDE 20 MG/ML
INJECTION, SOLUTION EPIDURAL; INFILTRATION; INTRACAUDAL; PERINEURAL PRN
Status: DISCONTINUED | OUTPATIENT
Start: 2021-09-11 | End: 2021-09-11 | Stop reason: SURG

## 2021-09-11 RX ORDER — LIDOCAINE HYDROCHLORIDE 10 MG/ML
INJECTION, SOLUTION EPIDURAL; INFILTRATION; INTRACAUDAL; PERINEURAL
Status: COMPLETED
Start: 2021-09-11 | End: 2021-09-11

## 2021-09-11 RX ORDER — BUPIVACAINE HYDROCHLORIDE AND EPINEPHRINE 5; 5 MG/ML; UG/ML
INJECTION, SOLUTION EPIDURAL; INTRACAUDAL; PERINEURAL
Status: DISCONTINUED | OUTPATIENT
Start: 2021-09-11 | End: 2021-09-11 | Stop reason: HOSPADM

## 2021-09-11 RX ORDER — HYDROMORPHONE HYDROCHLORIDE 1 MG/ML
0.4 INJECTION, SOLUTION INTRAMUSCULAR; INTRAVENOUS; SUBCUTANEOUS
Status: DISCONTINUED | OUTPATIENT
Start: 2021-09-11 | End: 2021-09-11 | Stop reason: HOSPADM

## 2021-09-11 RX ORDER — SODIUM CHLORIDE, SODIUM LACTATE, POTASSIUM CHLORIDE, CALCIUM CHLORIDE 600; 310; 30; 20 MG/100ML; MG/100ML; MG/100ML; MG/100ML
INJECTION, SOLUTION INTRAVENOUS CONTINUOUS
Status: DISCONTINUED | OUTPATIENT
Start: 2021-09-11 | End: 2021-09-11 | Stop reason: HOSPADM

## 2021-09-11 RX ORDER — METOCLOPRAMIDE HYDROCHLORIDE 5 MG/ML
INJECTION INTRAMUSCULAR; INTRAVENOUS PRN
Status: DISCONTINUED | OUTPATIENT
Start: 2021-09-11 | End: 2021-09-11 | Stop reason: SURG

## 2021-09-11 RX ORDER — ONDANSETRON 2 MG/ML
4 INJECTION INTRAMUSCULAR; INTRAVENOUS
Status: DISCONTINUED | OUTPATIENT
Start: 2021-09-11 | End: 2021-09-11 | Stop reason: HOSPADM

## 2021-09-11 RX ORDER — DIPHENHYDRAMINE HYDROCHLORIDE 50 MG/ML
12.5 INJECTION INTRAMUSCULAR; INTRAVENOUS
Status: DISCONTINUED | OUTPATIENT
Start: 2021-09-11 | End: 2021-09-11 | Stop reason: HOSPADM

## 2021-09-11 RX ORDER — HYDROMORPHONE HYDROCHLORIDE 1 MG/ML
0.2 INJECTION, SOLUTION INTRAMUSCULAR; INTRAVENOUS; SUBCUTANEOUS
Status: DISCONTINUED | OUTPATIENT
Start: 2021-09-11 | End: 2021-09-11 | Stop reason: HOSPADM

## 2021-09-11 RX ORDER — HYDRALAZINE HYDROCHLORIDE 20 MG/ML
5 INJECTION INTRAMUSCULAR; INTRAVENOUS
Status: DISCONTINUED | OUTPATIENT
Start: 2021-09-11 | End: 2021-09-11 | Stop reason: HOSPADM

## 2021-09-11 RX ORDER — DEXAMETHASONE SODIUM PHOSPHATE 4 MG/ML
INJECTION, SOLUTION INTRA-ARTICULAR; INTRALESIONAL; INTRAMUSCULAR; INTRAVENOUS; SOFT TISSUE PRN
Status: DISCONTINUED | OUTPATIENT
Start: 2021-09-11 | End: 2021-09-11 | Stop reason: SURG

## 2021-09-11 RX ORDER — SODIUM CHLORIDE, SODIUM LACTATE, POTASSIUM CHLORIDE, CALCIUM CHLORIDE 600; 310; 30; 20 MG/100ML; MG/100ML; MG/100ML; MG/100ML
INJECTION, SOLUTION INTRAVENOUS
Status: DISCONTINUED | OUTPATIENT
Start: 2021-09-11 | End: 2021-09-11 | Stop reason: SURG

## 2021-09-11 RX ADMIN — ONDANSETRON 4 MG: 2 INJECTION INTRAMUSCULAR; INTRAVENOUS at 10:40

## 2021-09-11 RX ADMIN — METRONIDAZOLE 500 MG: 500 INJECTION, SOLUTION INTRAVENOUS at 06:03

## 2021-09-11 RX ADMIN — METOCLOPRAMIDE 10 MG: 5 INJECTION, SOLUTION INTRAMUSCULAR; INTRAVENOUS at 10:09

## 2021-09-11 RX ADMIN — FENTANYL CITRATE 100 MCG: 50 INJECTION, SOLUTION INTRAMUSCULAR; INTRAVENOUS at 10:09

## 2021-09-11 RX ADMIN — MIDAZOLAM HYDROCHLORIDE 2 MG: 1 INJECTION, SOLUTION INTRAMUSCULAR; INTRAVENOUS at 10:09

## 2021-09-11 RX ADMIN — DEXAMETHASONE SODIUM PHOSPHATE 4 MG: 4 INJECTION, SOLUTION INTRA-ARTICULAR; INTRALESIONAL; INTRAMUSCULAR; INTRAVENOUS; SOFT TISSUE at 10:15

## 2021-09-11 RX ADMIN — Medication 0.5 ML: at 09:45

## 2021-09-11 RX ADMIN — PROPOFOL 150 MG: 10 INJECTION, EMULSION INTRAVENOUS at 10:11

## 2021-09-11 RX ADMIN — Medication 100 MG: at 10:11

## 2021-09-11 RX ADMIN — LIDOCAINE HYDROCHLORIDE 0.5 ML: 10 INJECTION, SOLUTION EPIDURAL; INFILTRATION; INTRACAUDAL; PERINEURAL at 09:45

## 2021-09-11 RX ADMIN — SODIUM CHLORIDE, SODIUM LACTATE, POTASSIUM CHLORIDE, CALCIUM CHLORIDE AND DEXTROSE MONOHYDRATE: 5; 600; 310; 30; 20 INJECTION, SOLUTION INTRAVENOUS at 04:59

## 2021-09-11 RX ADMIN — LIDOCAINE HYDROCHLORIDE 60 MG: 20 INJECTION, SOLUTION EPIDURAL; INFILTRATION; INTRACAUDAL at 10:09

## 2021-09-11 RX ADMIN — ROCURONIUM BROMIDE 25 MG: 10 INJECTION, SOLUTION INTRAVENOUS at 10:14

## 2021-09-11 RX ADMIN — FENTANYL CITRATE 50 MCG: 50 INJECTION, SOLUTION INTRAMUSCULAR; INTRAVENOUS at 11:05

## 2021-09-11 RX ADMIN — ROCURONIUM BROMIDE 5 MG: 10 INJECTION, SOLUTION INTRAVENOUS at 10:11

## 2021-09-11 RX ADMIN — FENTANYL CITRATE 50 MCG: 50 INJECTION, SOLUTION INTRAMUSCULAR; INTRAVENOUS at 10:22

## 2021-09-11 RX ADMIN — GLYCOPYRROLATE 0.1 MG: 0.2 INJECTION INTRAMUSCULAR; INTRAVENOUS at 10:09

## 2021-09-11 RX ADMIN — HYDROCODONE BITARTRATE AND ACETAMINOPHEN 7.5 MG: 7.5; 325 SOLUTION ORAL at 11:09

## 2021-09-11 RX ADMIN — CEFAZOLIN SODIUM 2 G: 2 INJECTION, SOLUTION INTRAVENOUS at 07:37

## 2021-09-11 RX ADMIN — FENTANYL CITRATE 50 MCG: 50 INJECTION, SOLUTION INTRAMUSCULAR; INTRAVENOUS at 11:12

## 2021-09-11 RX ADMIN — OXYCODONE 5 MG: 5 TABLET ORAL at 14:05

## 2021-09-11 RX ADMIN — ACETAMINOPHEN 650 MG: 325 TABLET, FILM COATED ORAL at 14:01

## 2021-09-11 RX ADMIN — SUGAMMADEX 200 MG: 100 INJECTION, SOLUTION INTRAVENOUS at 10:50

## 2021-09-11 RX ADMIN — SODIUM CHLORIDE, POTASSIUM CHLORIDE, SODIUM LACTATE AND CALCIUM CHLORIDE: 600; 310; 30; 20 INJECTION, SOLUTION INTRAVENOUS at 10:06

## 2021-09-11 ASSESSMENT — PAIN SCALES - GENERAL: PAIN_LEVEL: 3

## 2021-09-11 ASSESSMENT — PAIN DESCRIPTION - PAIN TYPE
TYPE: SURGICAL PAIN

## 2021-09-11 NOTE — ANESTHESIA PREPROCEDURE EVALUATION
Anes H&P:  PAST MEDICAL HISTORY:   26 y.o. female who presents for Procedure(s):  CHOLECYSTECTOMY, LAPAROSCOPIC.  She has current and past medical problems significant for:    NPO >12hrs  No nasuea  Delivered baby 2 months ago    Patient denies history of seizures or strokes  Patient denies history of diabetes or thyroid disease  Patient denies history of GERD or esophageal disease  Patient denies history of LISET  Patient denies history of previous MI, chest pain or shortness of breath  Patient denies history of renal failure  Patient denies history of upper or lower extremity motor/sensory deficits   Patient denies history of bleeding disorders  Patient denies history of complications with anesthesia    Able to climb 2 flights of stair without dyspnea or angina, > 4 METs     Past Medical History:   Diagnosis Date   • Anemia 10/16/2012   • Decreased platelet count (HCC) 6/20/2012   • Pregnancy        SMOKING/ALCOHOL/RECREATIONAL DRUG USE:  Social History     Tobacco Use   • Smoking status: Never Smoker   • Smokeless tobacco: Never Used   Vaping Use   • Vaping Use: Never used   Substance Use Topics   • Alcohol use: No   • Drug use: Not Currently     Social History     Substance and Sexual Activity   Drug Use Not Currently       PAST SURGICAL HISTORY:  Past Surgical History:   Procedure Laterality Date   • DILATION AND CURETTAGE N/A 9/9/2021    Procedure: DILATION AND CURETTAGE;  Surgeon: Belinda Cyr D.O.;  Location: SURGERY [facility];  Service: Obstetrics       ALLERGIES:   No Known Allergies    MEDICATIONS:  No current facility-administered medications on file prior to encounter.     No current outpatient medications on file prior to encounter.       LABS:  Lab Results   Component Value Date/Time    HEMOGLOBIN 9.5 (L) 09/11/2021 0437    HEMATOCRIT 29.3 (L) 09/11/2021 0437    WBC 5.5 09/11/2021 0437     Lab Results   Component Value Date/Time    SODIUM 141 09/11/2021 0437    POTASSIUM 3.7 09/11/2021 0437     CHLORIDE 107 09/11/2021 0437    CO2 25 09/11/2021 0437    GLUCOSE 98 09/11/2021 0437    BUN 13 09/11/2021 0437    CALCIUM 8.3 (L) 09/11/2021 0437       SARS-CoV2 result: Negative      PREVIOUS ANESTHETICS: See EMR  __________________________________________      Relevant Problems   NEURO   (positive) History of PCOS   (positive) History of thrombocytopenia-6/8 plt 113       Physical Exam    Airway   Mallampati: II  TM distance: >3 FB  Neck ROM: full       Cardiovascular - normal exam  Rhythm: regular  Rate: normal  (-) murmur     Dental - normal exam           Pulmonary - normal exam  Breath sounds clear to auscultation     Abdominal    Neurological - normal exam                 Anesthesia Plan    ASA 1- EMERGENT   ASA physical status emergent criteria: acutely contaminated wound or identified infection source    Plan - general       Airway plan will be ETT          Induction: intravenous    Postoperative Plan: Postoperative administration of opioids is intended.    Pertinent diagnostic labs and testing reviewed    Informed Consent:    Anesthetic plan and risks discussed with patient.    Use of blood products discussed with: patient whom consented to blood products.

## 2021-09-11 NOTE — OP REPORT
Surgeon: Noman Aguirre MD   Assistant: None  Preoperative diagnosis: Acute cholecystitis   Postop diagnosis: Acute cholecystitis   Procedure: Laparoscopic cholecystectomy   Anesthesia: General endotracheal anesthesia   Anesthesiologist: Lucio Culp MD  Indications: 26-year-old woman 2 weeks postpartum now with evidence of chronic and potentially acute cholecystitis.  A cholecystectomy is indicated.  Narrative: The procedure was discussed in detail with the patient including the laparoscopic approach, the potential for converting to an open procedure, and the associated risk of bleeding, infection, abscess, and hematoma. Also discussed the risk of postoperative bile leak, common bile duct stricture, common bile duct transection, and the significance of these complications. The patient understood all of the above and wished to proceed. The patient was placed under anesthesia by Dr. Culp. Patient's abdomen was prepped with chlorhexidine prep and sterile drapes. After a time out an infraumbilical incision was made. Through this incision the peritoneal cavity was entered using the open Hason entry technique. pneumoperitoneum was achieved with co2 insufflation to a pressure of 15 mmhg mercury. under direct visualization a 12 millimeters subxiphoid and two 5mm subcostal ports were placed. the gallbladder was identified and retracted superiorly and laterally. multiple adhesions were noted and these were carefully taken down. the base of the gallbladder was carefully dissected. the cystic duct was identified and could be seen to clearly exit the gallbladder and retract laterally along the gallbladder. In  a similar fashion the cystic artery was identified and dissected away from surrounding connective tissue. a critical view was obtained. subsequent to this 3 clips were placed proximally on the duct and 1 distally and it was divided with scissors. Similarly, the cystic artery was clipped twice proximally and one  distally and divided sharply with scissors. The gallbladder was then dissected off the liver bed and placed in a bag retrieval device. Hemostasis was assured with cautery. There was no evidence of bleeding or bile leak. Ports removed and the pneumoperitoneum was released. The infraumbilical fascia was approximated with an 0 Vicryl stitch. The subcutaneous tissue was irrigated and the skin on all incisions was approximatedwith 4.0 vicryl  stitches. Dermabond was placed. The patient tolerated procedure well without apparent complication. Final counts were reported as correct.  Wound class is class II.

## 2021-09-11 NOTE — ANESTHESIA TIME REPORT
Anesthesia Start and Stop Event Times     Date Time Event    9/11/2021 1003 Ready for Procedure     1006 Anesthesia Start     1113 Anesthesia Stop        Responsible Staff  09/11/21    Name Role Begin End    Lucio Culp M.D. Anesth 1006 1113        Preop Diagnosis (Free Text):  Pre-op Diagnosis     acute cholecystitis        Preop Diagnosis (Codes):    Post op Diagnosis  Acute cholecystitis      Premium Reason  A. 3PM - 7AM    Comments: emergency

## 2021-09-11 NOTE — ANESTHESIA POSTPROCEDURE EVALUATION
Patient: Nataliia Falk    Procedure Summary     Date: 09/11/21 Room / Location: [place] [id number] / SURGERY [facility]    Anesthesia Start: 1006 Anesthesia Stop: 1113    Procedure: CHOLECYSTECTOMY, LAPAROSCOPIC (Abdomen) Diagnosis: (acue cholecystitis per Dr Aguirre)    Surgeons: Noman Aguirre M.D. Responsible Provider: Lucio Culp M.D.    Anesthesia Type: general ASA Status: 1 - Emergent          Final Anesthesia Type: general  Last vitals  BP   Blood Pressure: 106/66    Temp   36.5 °C (97.7 °F)    Pulse   74   Resp   20    SpO2   95 %      Anesthesia Post Evaluation    Patient location during evaluation: PACU  Patient participation: complete - patient participated  Level of consciousness: awake and alert  Pain score: 3    Airway patency: patent  Anesthetic complications: no  Cardiovascular status: hemodynamically stable  Respiratory status: acceptable  Hydration status: euvolemic    PONV: none          No complications documented.     Nurse Pain Score: 5 (NPRS)

## 2021-09-11 NOTE — DISCHARGE INSTRUCTIONS
PATIENT INSTRUCTIONS  GALL BLADDER SURGERY  (CHOLECYSTECTOMY)    FOLLOW-UP:  Please make an appointment with your physician in 1 week.  Call your physician immediately if you have any fevers greater than 102.5, drainage from your wound that is not clear or looks infected, persistent bleeding, increasing abdominal pain, problems urinating, or persistent nausea/vomiting.  You should be aware that you may have right shoulder pain after surgery and that this will progressively go away.  This is called 'referred pain' and is from the area of the gallbladder.  It can also be caused by gas that may be trapped under the diaphragm from the surgery, especially if it was performed laparoscopically through mini-incisions.  This gas will progressively get reabsorbed by your body.     WOUND CARE INSTRUCTIONS:  Keep a dry clean dressing on the wound if there is drainage. The initial bandage may be removed after 24 hours.  Once the wound has quit draining you may leave it open to air.  If clothing rubs against the wound or causes irritation and the wound is not draining you may cover it with a dry dressing during the daytime.  Try to keep the wound dry and avoid ointments on the wound unless directed to do so.  If the wound becomes bright red and painful or starts to drain infected material that is not clear, please contact your physician immediately.   You should also call if you begin to drain fluid that is thin and greenish-brown from the wound and appears to look like bile.  If the wound though is mildly pink and has a thick firm ridge underneath it, this is normal, and is referred to as a healing ridge.  This will resolve over the next 4-6 weeks.    DIET:  You may eat any foods that you can tolerate.  It is a good idea to eat a high fiber diet and take in plenty of fluids to prevent constipation.  If you do become constipated you may want to take a mild laxative or take ducolax tablets on a daily basis until your bowel habits  are regular.  Constipation can be very uncomfortable, along with straining, after recent abdominal surgery.    ACTIVITY:  You are encouraged to cough and deep breath or use your incentive spirometer if you were given one, every 15-30 minutes when awake.  This will help prevent respiratory complications and low grade fevers post-operatively.  You may want to hug a pillow when coughing and sneezing to add additional support to the surgical area(s) which will decrease pain during these times.  You are encouraged to walk and engage in light activity for the next two weeks.  You should not lift more than 20 pounds during this time frame as it could put you at increased risk for a post-operative hernia.  Twenty pounds is roughly equivalent to a plastic bag of groceries.      MEDICATIONS:  Try to take narcotic medications and anti-inflammatory medications, such as tylenol, ibuprofen, naprosyn, etc., with food.  This will minimize stomach upset from the medication.  Should you develop nausea and vomiting from the pain medication, or develop a rash, please discontinue the medication and contact your physician.  You should not drive, make important decisions, or operate machinery when taking narcotic pain medication.    QUESTIONS:  Please feel free to call your physician or the hospital  if you have any questions, and they will be glad to assist you.

## 2021-09-11 NOTE — PROGRESS NOTES
POSTPARTUM    PROGRESS  NOTE;    PATIENT ID:  NAME:  Nataliia Falk  MRN:               [id number]  YOB: 1995         26 y.o. female  at 40w3d PPD#14 s/p  and readmitted for presumed endometritis treated with IV antibiotics/D&C now with right upper quadrant pain    Subjective: Still having right upper quadrant pain    Objective:    Vitals:    09/10/21 1400 09/10/21 1800 09/10/21 2200 21 0200   BP: 112/68 (!) 91/58 101/70 (!) 99/53   Pulse: 81 72 71 81   Resp: 18 16 18 17   Temp: 36.3 °C (97.4 °F) 36.6 °C (97.8 °F) 36.5 °C (97.7 °F) 36.7 °C (98 °F)   TempSrc: Temporal Temporal Temporal Temporal   SpO2: 98% 98% 98% 97%   Weight:       Height:         General: No acute distress, resting comfortably in bed.  HEENT: normocephalic, nontraumatic, PERRLA, EOMI  Cardiovascular: Heart RRR with no murmurs, rubs or gallops. Distal Pulses 2+  Respiratory: symmetric chest expansion, lungs CTA bilaterally with no wheezes rales or rhonci  Abdomen: soft, mildly tender, fundus firm, +BS  Genitourinary: lochia light, denies excessive vaginal bleeding  Musculoskeletal: strength 5/5 in four extremities  Neuro: non focal with no numbness, tingling or changes in sensation    Recent Labs     21  1800 09/10/21  0822 21  0437   WBC 9.1 6.4 5.5   RBC 2.99* 2.85* 3.02*   HEMOGLOBIN 9.6* 8.9* 9.5*   HEMATOCRIT 29.1* 27.5* 29.3*   MCV 97.3 96.5 97.0   MCH 32.1 31.2 31.5   RDW 46.1 45.9 46.4   PLATELETCT 166 155* 148*   MPV 12.4 12.8 12.7   NEUTSPOLYS 77.70* 54.00 42.00*   LYMPHOCYTES 15.80* 36.80 47.70*   MONOCYTES 5.70 7.60 6.00   EOSINOPHILS 0.20 0.60 3.20   BASOPHILS 0.20 0.50 0.70     Recent Labs     09/10/21  0822 21  0437   SODIUM 141 141   POTASSIUM 3.8 3.7   CHLORIDE 110 107   CO2 23 25   GLUCOSE 104* 98   BUN 11 13       Current Meds:   Current Facility-Administered Medications   Medication Dose Frequency Provider Last Rate Last Admin   • ondansetron (ZOFRAN) syringe/vial injection  4 mg  4 mg Q6HRS PRN SHELLY CoffmanNP   4 mg at 09/10/21 0308   • famotidine (PEPCID) injection 20 mg  20 mg BID PRN SHELLY CoffmanNP   20 mg at 09/10/21 0308   • ceFAZolin in dextrose (ANCEF) IVPB premix 2 g  2 g Q8HRS Johnnie Fish M.D.   Stopped at 09/10/21 2227   • metroNIDAZOLE (FLAGYL) IVPB 500 mg  500 mg Q8HRS Johnnie Fish M.D.   Stopped at 09/10/21 2328   • D5LR infusion   Continuous Johnnie Fish M.D. 125 mL/hr at 21 0459 New Bag at 21 045   • LR infusion   PRN Delia Mohan, C.N.M.       • PRN oxytocin (PITOCIN) (20 Units/1000 mL) PRN for excessive uterine bleeding - See Admin Instr  125-999 mL/hr Once PRN Delia Mohan, C.N.M.       • miSOPROStol (CYTOTEC) tablet 600 mcg  600 mcg Once PRN Delia Mohan, C.N.M.       • docusate sodium (COLACE) capsule 100 mg  100 mg BID PRN Delia Mohan, C.N.M.       • magnesium hydroxide (MILK OF MAGNESIA) suspension 30 mL  30 mL Q6HRS PRN Delia Mohan, C.N.M.       • prenatal plus vitamin (STUARTNATAL 1+1) 27-1 MG tablet 1 Tablet  1 Tablet Daily-0800 Delia Mohan C.N.M.   1 Tablet at 09/10/21 0807   • ibuprofen (MOTRIN) tablet 800 mg  800 mg TID PRN SHELLY CoffmanNP   800 mg at 09/10/21 0952   • acetaminophen (Tylenol) tablet 650 mg  650 mg Q4HRS PRN SHELLY CoffmanNP   650 mg at 21   Last reviewed on 2021 12:29 AM by Letha Pedro R.N.       Assessment:  26 y.o. female  at 40w3d PPD#14 s/p /endometritis/right upper quadrant pain-likely secondary to choledocholithiasis    Plan:   1. Per general surgery/GI-liver function test and lipase to be repeated this morning results pending-General surgery likely to proceed with laparoscopic cholecystectomy today  2.       Johnnie Fish MD

## 2021-09-11 NOTE — PROGRESS NOTES
Spoke to general surgeon Dr. Aguirre for postop instructions and care.  Per our phone conversation,     1.  General surgery has signed off  2.  No postop antibiotic regimen recommended  3.  Patient expected to follow-up with general surgery 1 week postop    Ailyn Santos MD PhD

## 2021-09-11 NOTE — ANESTHESIA PROCEDURE NOTES
Airway    Date/Time: 9/11/2021 10:13 AM  Performed by: Lucio Culp M.D.  Authorized by: Lucio Culp M.D.     Location:  OR  Urgency:  Elective  Indications for Airway Management:  Anesthesia      Spontaneous Ventilation: absent    Sedation Level:  Deep  Preoxygenated: Yes    Patient Position:  Sniffing  Final Airway Type:  Endotracheal airway  Final Endotracheal Airway:  ETT  Cuffed: Yes    Technique Used for Successful ETT Placement:  Direct laryngoscopy    Insertion Site:  Oral  Blade Type:  Gerald  Laryngoscope Blade/Videolaryngoscope Blade Size:  3  ETT Size (mm):  6.5  Measured from:  Lips  ETT to Lips (cm):  20  Placement Verified by: auscultation and capnometry    Cormack-Lehane Classification:  Grade IIa - partial view of glottis  Number of Attempts at Approach:  1   Atraumatic x1 attempt

## 2021-09-11 NOTE — CONSULTS
Surgery General History & Physical Note    Date  9/10/2021    Primary Care Physician  Pcp Pt States None    CC  Abdominal pain    Requesting physician: Dr. Johnnie Fish    HPI  This is a 26 y.o. female who presented initially on the ninth with lower abdominal and fundal pain.  She was taken for a D&C and has been treated for endometritis empirically.  On the night of anticipated discharge she began having right upper quadrant pain radiating around to her back.  This was similar pain to her previous biliary colic which she experienced throughout pregnancy.  She had never had problems with that before, it only started when she got pregnant.  She had an episode of vomiting yesterday after a GI cocktail.  She has been able to eat today but has not eaten anything fatty.  What she did eat did not make her abdominal pain worse.  She feels fairly comfortable sitting, but has increased pain with pressure on the right upper quadrant.  She has been needing narcotic pain medications to control the upper abdominal pain.    Past Medical History:   Diagnosis Date   • Anemia 10/16/2012   • Decreased platelet count (HCC) 6/20/2012   • Pregnancy        Past Surgical History:   Procedure Laterality Date   • DILATION AND CURETTAGE N/A 9/9/2021    Procedure: DILATION AND CURETTAGE;  Surgeon: Belinda Cyr D.O.;  Location: SURGERY [facility];  Service: Obstetrics       Current Facility-Administered Medications   Medication Dose Route Frequency Provider Last Rate Last Admin   • ondansetron (ZOFRAN) syringe/vial injection 4 mg  4 mg Intravenous Q6HRS PRN SHELLY CoffmanNP   4 mg at 09/10/21 0308   • famotidine (PEPCID) injection 20 mg  20 mg Intravenous BID PRN Jet Catherine OGNP   20 mg at 09/10/21 0308   • ceFAZolin in dextrose (ANCEF) IVPB premix 2 g  2 g Intravenous Q8HRS Johnnie Fish M.D. 200 mL/hr at 09/10/21 2157 2 g at 09/10/21 2157   • metroNIDAZOLE (FLAGYL) IVPB 500 mg  500 mg Intravenous Q8HRS Johnnie GALLARDO  MIGNON Fish 100 mL/hr at 09/10/21 2228 500 mg at 09/10/21 2228   • D5LR infusion   Intravenous Continuous Johnnie Fish M.D. 125 mL/hr at 09/10/21 1818 New Bag at 09/10/21 1818   • LR infusion   Intravenous PRN FAMILIA QuinteroN.M.       • PRN oxytocin (PITOCIN) (20 Units/1000 mL) PRN for excessive uterine bleeding - See Admin Instr  125-999 mL/hr Intravenous Once PRN Delia Mohan C.N.M.       • miSOPROStol (CYTOTEC) tablet 600 mcg  600 mcg Rectal Once PRN SOWMYA Quintero.N.M.       • docusate sodium (COLACE) capsule 100 mg  100 mg Oral BID PRN SOWMYA Quintero.N.M.       • magnesium hydroxide (MILK OF MAGNESIA) suspension 30 mL  30 mL Oral Q6HRS PRN SOWMYA Quintero.N.M.       • prenatal plus vitamin (STUARTNATAL 1+1) 27-1 MG tablet 1 Tablet  1 Tablet Oral Daily-0800 SOWMYA Quintero.N.M.   1 Tablet at 09/10/21 0807   • ibuprofen (MOTRIN) tablet 800 mg  800 mg Oral TID PRN Jet Catherine, OGNP   800 mg at 09/10/21 0952   • acetaminophen (Tylenol) tablet 650 mg  650 mg Oral Q4HRS PRN Jet Catherine, OGNP   650 mg at 09/09/21 2138       Social History     Socioeconomic History   • Marital status:      Spouse name: Not on file   • Number of children: Not on file   • Years of education: Not on file   • Highest education level: Not on file   Occupational History   • Not on file   Tobacco Use   • Smoking status: Never Smoker   • Smokeless tobacco: Never Used   Vaping Use   • Vaping Use: Never used   Substance and Sexual Activity   • Alcohol use: No   • Drug use: Not Currently   • Sexual activity: Yes     Partners: Male   Other Topics Concern   • Not on file   Social History Narrative   • Not on file     Social Determinants of Health     Financial Resource Strain:    • Difficulty of Paying Living Expenses:    Food Insecurity:    • Worried About Running Out of Food in the Last Year:    • Ran Out of Food in the Last Year:    Transportation Needs:    • Lack of Transportation (Medical):    • Lack of  Transportation (Non-Medical):    Physical Activity:    • Days of Exercise per Week:    • Minutes of Exercise per Session:    Stress:    • Feeling of Stress :    Social Connections:    • Frequency of Communication with Friends and Family:    • Frequency of Social Gatherings with Friends and Family:    • Attends [employer] Services:    • Active Member of Clubs or Organizations:    • Attends Club or Organization Meetings:    • Marital Status:    Intimate Partner Violence:    • Fear of Current or Ex-Partner:    • Emotionally Abused:    • Physically Abused:    • Sexually Abused:        Family History   Problem Relation Age of Onset   • Cancer Paternal Grandmother         breast cancer    • Cancer Paternal Grandfather        Allergies  Patient has no known allergies.    Review of Systems  General: No weight changes or fatigue   HEENT: No changes in vision or trouble swallowing  CV: No chest pain or palpitations  Pulm: No shortness of breath or cough  GI: As above in HPI, no melena or hematochezia, no hematemesis  : No dysuria or hematuria  MSK: No joint or muscle pain  Skin: No new rashes or lesions  Lymph/Heme: No swelling or easy bruising, no lymphadenopathy  Neuro: No headaches or seizures  Psych: No SI/HI    Physical Exam  Constitutional:       General: She is not in acute distress.     Appearance: Normal appearance. She is not ill-appearing, toxic-appearing or diaphoretic.   HENT:      Head: Normocephalic and atraumatic.      Right Ear: External ear normal.      Left Ear: External ear normal.      Mouth/Throat:      Mouth: Mucous membranes are moist.   Eyes:      Extraocular Movements: Extraocular movements intact.   Cardiovascular:      Rate and Rhythm: Normal rate and regular rhythm.   Pulmonary:      Effort: Pulmonary effort is normal.   Abdominal:      General: Abdomen is flat. There is no distension.      Tenderness: There is abdominal tenderness.      Comments: Mild right upper quadrant tenderness with an  equivocal Ramirez sign   Musculoskeletal:         General: Normal range of motion.   Skin:     General: Skin is warm and dry.      Coloration: Skin is not jaundiced.   Neurological:      General: No focal deficit present.      Mental Status: She is alert and oriented to person, place, and time.   Psychiatric:         Mood and Affect: Mood normal.         Behavior: Behavior normal.         Vital Signs  Blood Pressure: (!) 91/58   Temperature: 36.6 °C (97.8 °F)   Pulse: 72   Respiration: 16   Pulse Oximetry: 98 %       Labs:  Recent Labs     09/08/21  2301 09/09/21  1800 09/10/21  0822   WBC 11.8* 9.1 6.4   RBC 4.00* 2.99* 2.85*   HEMOGLOBIN 12.5 9.6* 8.9*   HEMATOCRIT 37.6 29.1* 27.5*   MCV 94.0 97.3 96.5   MCH 31.3 32.1 31.2   MCHC 33.2* 33.0* 32.4*   RDW 43.8 46.1 45.9   PLATELETCT 180 166 155*   MPV 12.3 12.4 12.8     Recent Labs     09/10/21  0822   SODIUM 141   POTASSIUM 3.8   CHLORIDE 110   CO2 23   GLUCOSE 104*   BUN 11   CREATININE 0.69   CALCIUM 8.1*         Recent Labs     09/10/21  0822   ASTSGOT 472*   ALTSGPT 287*   TBILIRUBIN 1.4   ALKPHOSPHAT 162*   GLOBULIN 2.4       Radiology:  [id number]   Final Result         1. Dilated CBD with tapering distally. No CBD stone identified. Stricture cannot be excluded.      2. Mild gallbladder wall thickening and pericholecystic fluid is new since prior ultrasound and early acute cholecystitis is possible.      US-RUQ   Final Result         1.  Hepatomegaly and echogenic liver, compatible with fatty change versus fibrosis.   2.  Cholelithiasis without additional sonographic findings of acute cholecystitis.   3.  Common bile duct dilatation, new since prior study, concerning for biliary obstruction, workup and evaluation for causes of biliary obstruction including ERCP or MRCP recommended.      US-PELVIC TRANSABDOMINAL ONLY   Final Result         1.  Thickened heterogeneous endometrium, could represent retained products of conception however there is no visualized  increased vascularity.   2.  Nonvisualization of the right ovary limits evaluation.        On my review of the images the right upper quadrant ultrasound shows no evidence of acute cholecystitis, with a gallbladder wall thickness of 1.8 mm.  However on her MRI there is slightly thickened gallbladder wall with some pericholecystic fluid which was not obvious on the ultrasound.  No choledocholithiasis noted on MRI currently, however the common bile duct is dilated.    Assessment/Plan:  Likely choledocholithiasis that has possibly passed with possible early acute cholecystitis    Surgical plan: Laparoscopic cholecystectomy    On reading the note from Dr. Saucedo today, he recommends checking liver function tests again tomorrow.  Those have been ordered by I also added on a lipase which was high today.  The pattern of her liver function tests increased suggests choledocholithiasis, and the MRCP without current choledocholithiasis suggest that that has passed.    Dr. Saucedo recommended an ERCP if the patient's liver function tests were to worsen tomorrow, to allow for evaluation and treatment of any occult stones or sludge in the common bile duct.  If the liver function tests are better we will take her for laparoscopic cholecystectomy with Dr. Aguirre.    We discussed risks, benefits, and alternatives with risks, including, but not limited to, bleeding, infection, damage to surrounding structures such as small or large intestine, damage to the common bile duct possibly requiring hepaticojejunostomy, possible cystic duct stump leak requiring further procedures, hernia, need for an open procedure. All questions were answered to the patient's apparent satisfaction and they agreed to proceed.     Her last Covid test was on 9/8/2021 and that was negative.  She will be n.p.o. after midnight tonight.

## 2021-09-11 NOTE — DISCHARGE SUMMARY
Discharge Summary:     Date of Admission: 2021  Date of Discharge: 2021      Admitting diagnosis:    1.  Status post vaginal delivery  2.  Endometritis      Discharge Diagnosis:   1. Status post vaginal delivery  2.  Status post D&C  3.  Endometritis  4.  Acute cholecystitis  5.  Cholecystectomy, laparoscopic      Past Medical History:   Diagnosis Date   • Anemia 10/16/2012   • Decreased platelet count (HCC) 2012   • Pregnancy      OB History    Para Term  AB Living   2 2 2     2   SAB TAB Ectopic Molar Multiple Live Births           0 2      # Outcome Date GA Lbr Memo/2nd Weight Sex Delivery Anes PTL Lv   2 Term 21 40w3d / 00:22 3.52 kg (7 lb 12.2 oz) M Vag-Spont EPI N TAYLOR   1 Term 12   3.204 kg (7 lb 1 oz) F Vag-Spont   TAYLOR      Birth Comments: System Generated. Please review and update pregnancy details.     Past Surgical History:   Procedure Laterality Date   • DILATION AND CURETTAGE N/A 2021    Procedure: DILATION AND CURETTAGE;  Surgeon: Belinda Cyr D.O.;  Location: SURGERY [facility];  Service: Obstetrics     Patient has no known allergies.    Patient Active Problem List   Diagnosis   • Supervision of other normal pregnancy, antepartum   • Anemia   • History of thrombocytopenia- plt 113   • History of PCOS   • Cholelithiasis affecting pregnancy, antepartum       Hospital Course:   26 y.o. now , was admitted on 2021, 12 days status post  who was readmitted for presumed endometritis treated with IV antibiotics as well as a D&C.  Patient's hospital course was significant for the development of right upper quadrant pain coupled with nausea and vomiting.  Per patient history this was persistent post D&C and described as wrapping around her back.  Right upper quadrant ultrasound revealed dilated common bile duct with findings consistent with possible cholecystitis.  GI consult was placed, MRI revealed slightly thickened gallbladder wall  with pericholecystic fluid which was not obvious on ultrasound.  Given the likelihood of choledocholithiasis, general surgery was consulted and a laparoscopic cholecystectomy was planned. Patient tolerated surgical procedure well.  Gallbladder was removed with no complications.  Patient has recovered well and desires discharge.  Patient to discharge home on stable condition with home pain medication.  Patient instructed to return should she note fevers chills, bleeding from surgical incisions, increased pain, shortness of breath or dizziness.     Physical Exam:  Temp:  [36.4 °C (97.5 °F)-36.9 °C (98.4 °F)] 36.8 °C (98.2 °F)  Pulse:  [67-84] 80  Resp:  [13-20] 18  BP: ()/(53-75) 114/75  SpO2:  [93 %-100 %] 93 %  Physical Exam  General: Well-appearing, no acute distress  Chest/Breasts: No visible trauma  Abdomen: Incisions: Consistent with laparoscopic cholecystectomy  Fundus: Nontender at the level of the umbilicus   Extremities: Non edematous, calves nontender    Current Facility-Administered Medications   Medication Dose   • oxyCODONE immediate-release (ROXICODONE) tablet 5-10 mg  5-10 mg   • FENTANYL CITRATE (PF) 0.05 MG/ML INJ SOLN (WRAPPED)     • ondansetron (ZOFRAN) syringe/vial injection 4 mg  4 mg   • famotidine (PEPCID) injection 20 mg  20 mg   • ceFAZolin in dextrose (ANCEF) IVPB premix 2 g  2 g   • metroNIDAZOLE (FLAGYL) IVPB 500 mg  500 mg   • D5LR infusion     • LR infusion     • PRN oxytocin (PITOCIN) (20 Units/1000 mL) PRN for excessive uterine bleeding - See Admin Instr  125-999 mL/hr   • docusate sodium (COLACE) capsule 100 mg  100 mg   • magnesium hydroxide (MILK OF MAGNESIA) suspension 30 mL  30 mL   • prenatal plus vitamin (STUARTNATAL 1+1) 27-1 MG tablet 1 Tablet  1 Tablet   • ibuprofen (MOTRIN) tablet 800 mg  800 mg   • acetaminophen (Tylenol) tablet 650 mg  650 mg       Recent Labs     09/09/21  1800 09/10/21  0822 09/11/21  0437   WBC 9.1 6.4 5.5   RBC 2.99* 2.85* 3.02*   HEMOGLOBIN 9.6*  8.9* 9.5*   HEMATOCRIT 29.1* 27.5* 29.3*   MCV 97.3 96.5 97.0   MCH 32.1 31.2 31.5   MCHC 33.0* 32.4* 32.4*   RDW 46.1 45.9 46.4   PLATELETCT 166 155* 148*   MPV 12.4 12.8 12.7         Activity/ Discharge Instructions::   Discharge to home, continue post partum care   Pelvic Rest x 6 weeks  No heavy lifting x4 weeks  Call or come to ED for: heavy vaginal bleeding, fever >100.4, severe abdominal pain, severe headache, chest pain, shortness of breath,  N/V, incisional drainage, or other concerns.       Follow up:  [facility] in 5 weeks for vaginal delivery  Follow-up with surgical team,  Dr. Noman Aguirre, 1 week postop.    Discharge Meds:   Current Outpatient Medications   Medication Sig Dispense Refill   • acetaminophen (TYLENOL) 325 MG Tab Take 2 Tablets by mouth every four hours as needed. 30 Tablet 0   • ibuprofen (MOTRIN) 800 MG Tab Take 1 Tablet by mouth 3 times a day as needed. 30 Tablet 0       Ailyn Santos M.D.PhD

## 2021-09-11 NOTE — PROGRESS NOTES
Received bedside report from TERELL Anguiano. IV patent running D5LR at 125 ml/hr. Patient aware and is NPO, awaiting general surgery consult. Patient declines pain at this time and will call if needing pain medication. Call light within reach. Patient encouraged to call with any needs and or concerns.

## 2021-09-13 LAB — PATHOLOGY CONSULT NOTE: NORMAL

## 2021-09-22 ENCOUNTER — TELEPHONE (OUTPATIENT)
Dept: OBGYN | Facility: CLINIC | Age: 26
End: 2021-09-22

## 2021-09-22 DIAGNOSIS — N30.00 ACUTE CYSTITIS WITHOUT HEMATURIA: Primary | ICD-10-CM

## 2021-09-22 RX ORDER — AMOXICILLIN 500 MG/1
500 CAPSULE ORAL 3 TIMES DAILY
Qty: 15 CAPSULE | Refills: 0 | Status: SHIPPED | OUTPATIENT
Start: 2021-09-22 | End: 2021-09-27

## 2021-09-22 NOTE — TELEPHONE ENCOUNTER
----- Message from Delia Mohan C.N.M. sent at 9/22/2021 10:10 AM PDT -----  Please call patient, and let her know I sent RX for UTI.      9/22/2021 1129 Left message for pt to call back regarding UA results.   1243 pt called back and notified of Pt notified of UTI and needs to start on antibiotics, instructions given. Rx sent by provider. Pharmacy verified with pt. Pt verbalized understanding.   Pt requesting to schedule a f/u appt after D&C done by Dr. Cyr on 9/9/2021 and pt was told to f/u in 2 weeks. Pt scheduled with Dr. Francois for tomorrow at 1530. Pt agreed and verbalized understanding.

## 2021-09-23 ENCOUNTER — POST PARTUM (OUTPATIENT)
Dept: OBGYN | Facility: CLINIC | Age: 26
End: 2021-09-23
Payer: COMMERCIAL

## 2021-09-23 VITALS — DIASTOLIC BLOOD PRESSURE: 61 MMHG | WEIGHT: 149.9 LBS | BODY MASS INDEX: 32.43 KG/M2 | SYSTOLIC BLOOD PRESSURE: 91 MMHG

## 2021-09-23 PROCEDURE — 99213 OFFICE O/P EST LOW 20 MIN: CPT | Performed by: OBSTETRICS & GYNECOLOGY

## 2021-09-23 ASSESSMENT — EDINBURGH POSTNATAL DEPRESSION SCALE (EPDS)
TOTAL SCORE: 0
I HAVE BEEN SO UNHAPPY THAT I HAVE HAD DIFFICULTY SLEEPING: NOT AT ALL
I HAVE BEEN SO UNHAPPY THAT I HAVE BEEN CRYING: NO, NEVER
I HAVE BLAMED MYSELF UNNECESSARILY WHEN THINGS WENT WRONG: NO, NEVER
I HAVE LOOKED FORWARD WITH ENJOYMENT TO THINGS: AS MUCH AS I EVER DID
I HAVE FELT SAD OR MISERABLE: NO, NOT AT ALL
I HAVE FELT SCARED OR PANICKY FOR NO GOOD REASON: NO, NOT AT ALL
THINGS HAVE BEEN GETTING ON TOP OF ME: NO, I HAVE BEEN COPING AS WELL AS EVER
THE THOUGHT OF HARMING MYSELF HAS OCCURRED TO ME: NEVER
I HAVE BEEN ABLE TO LAUGH AND SEE THE FUNNY SIDE OF THINGS: AS MUCH AS I ALWAYS COULD
I HAVE BEEN ANXIOUS OR WORRIED FOR NO GOOD REASON: NO, NOT AT ALL

## 2021-09-23 ASSESSMENT — FIBROSIS 4 INDEX: FIB4 SCORE: 1.67

## 2021-09-23 NOTE — PROGRESS NOTES
Pt is here for postpartum exam  Pharmacy verified.  Good Phone #:[phone]  Delivery date:8/28/2021  Breast feeding.  LMP:Not yet  Last Pap:2/16/2021, NEG  BCM:Pt states she does not want birth control at the moment.  Pt states no other complaints for today.  EPDS:0

## 2021-09-23 NOTE — PROGRESS NOTES
SUBJECTIVE:  Nataliia Falk presents after SDC for retained POCs on 9/9/21.     Eating a regular diet without difficulty. Bowel movement are Normal.  The patient is not having any pain. Light. Patient Denies Incisional pain, drainage or redness    OBJECTIVE:  BP (!) 91/61   Wt 68 kg (149 lb 14.4 oz)   LMP 11/18/2020 (Exact Date)   BMI 32.43 kg/m²   Current Outpatient Medications on File Prior to Visit   Medication Sig Dispense Refill   • amoxicillin (AMOXIL) 500 MG Cap Take 1 Capsule by mouth 3 times a day for 5 days. 15 Capsule 0   • acetaminophen (TYLENOL) 325 MG Tab Take 2 Tablets by mouth every four hours as needed. 30 Tablet 0   • ibuprofen (MOTRIN) 800 MG Tab Take 1 Tablet by mouth 3 times a day as needed. 30 Tablet 0     No current facility-administered medications on file prior to visit.       Constitutional:  alert, healthy, no distress.  Abdomen:  soft, bowel sounds active, non-tender.  Incision:  healing well, no drainage, no erythema, no hernia, no seroma, no swelling, no dehiscence, incision well approximated.    IMPRESSION: Doing well postoperatively.  FU in 3 weeks for postpartum exam    Lab:   Recent Results (from the past 1008 hour(s))   SARS-CoV-2 PCR (24 hour In-House): Collect NP swab in VTM    Collection Time: 08/23/21  5:45 PM    Specimen: Nasopharyngeal; Respirate   Result Value Ref Range    SARS-CoV-2 Source NP Swab     SARS-CoV-2 by PCR NotDetected    CBC WITH DIFFERENTIAL    Collection Time: 08/28/21  7:08 PM   Result Value Ref Range    WBC 15.3 (H) 4.8 - 10.8 K/uL    RBC 3.87 (L) 4.20 - 5.40 M/uL    Hemoglobin 12.5 12.0 - 16.0 g/dL    Hematocrit 37.5 37.0 - 47.0 %    MCV 96.9 81.4 - 97.8 fL    MCH 32.3 27.0 - 33.0 pg    MCHC 33.3 (L) 33.6 - 35.0 g/dL    RDW 49.3 35.9 - 50.0 fL    Platelet Count 98 (L) 164 - 446 K/uL    MPV 13.8 (H) 9.0 - 12.9 fL    Neutrophils-Polys 79.70 (H) 44.00 - 72.00 %    Lymphocytes 14.40 (L) 22.00 - 41.00 %    Monocytes 5.00 0.00 - 13.40 %     Eosinophils 0.30 0.00 - 6.90 %    Basophils 0.10 0.00 - 1.80 %    Immature Granulocytes 0.50 0.00 - 0.90 %    Nucleated RBC 0.00 /100 WBC    Neutrophils (Absolute) 12.17 (H) 2.00 - 7.15 K/uL    Lymphs (Absolute) 2.20 1.00 - 4.80 K/uL    Monos (Absolute) 0.76 0.00 - 0.85 K/uL    Eos (Absolute) 0.05 0.00 - 0.51 K/uL    Baso (Absolute) 0.02 0.00 - 0.12 K/uL    Immature Granulocytes (abs) 0.08 0.00 - 0.11 K/uL    NRBC (Absolute) 0.00 K/uL   HOLD BLOOD BANK SPECIMEN (NOT TESTED)    Collection Time: 08/28/21  7:08 PM   Result Value Ref Range    Holding Tube - Bb DONE    Blood Culture,Hold    Collection Time: 08/29/21  1:41 AM   Result Value Ref Range    Blood Culture Hold Collected    CBC without differential    Collection Time: 08/29/21 10:37 AM   Result Value Ref Range    WBC 12.5 (H) 4.8 - 10.8 K/uL    RBC 3.30 (L) 4.20 - 5.40 M/uL    Hemoglobin 10.7 (L) 12.0 - 16.0 g/dL    Hematocrit 32.4 (L) 37.0 - 47.0 %    MCV 98.2 (H) 81.4 - 97.8 fL    MCH 32.4 27.0 - 33.0 pg    MCHC 33.0 (L) 33.6 - 35.0 g/dL    RDW 50.4 (H) 35.9 - 50.0 fL    Platelet Count 87 (L) 164 - 446 K/uL    MPV 14.2 (H) 9.0 - 12.9 fL   COV-2, FLU A/B, AND RSV BY PCR (2-4 HOURS CEPHEID): Collect NP swab in VTM    Collection Time: 09/08/21 10:45 PM    Specimen: Nasopharyngeal; Respirate   Result Value Ref Range    Influenza virus A RNA Negative Negative    Influenza virus B, PCR Negative Negative    RSV, PCR Negative Negative    SARS-CoV-2 by PCR NotDetected     SARS-CoV-2 Source NP Swab    URINALYSIS    Collection Time: 09/08/21 10:45 PM    Specimen: Urine, Clean Catch   Result Value Ref Range    Color Yellow     Character Clear     Specific Gravity 1.025 <1.035    Ph 5.5 5.0 - 8.0    Glucose Negative Negative mg/dL    Ketones Negative Negative mg/dL    Protein Negative Negative mg/dL    Bilirubin Negative Negative    Urobilinogen, Urine 0.2 Negative    Nitrite Negative Negative    Leukocyte Esterase Small (A) Negative    Occult Blood Large (A) Negative     Micro Urine Req Microscopic    URINE CULTURE(NEW)    Collection Time: 09/08/21 10:45 PM    Specimen: Urine, Clean Catch   Result Value Ref Range    Significant Indicator POS (POS)     Source UR     Site URINE, CLEAN CATCH     Culture Result Usual skin kathrine 10-50,000 cfu/mL (A)     Culture Result (A)      Streptococcus agalactiae (Group B)  <10,000 cfu/mL     URINE MICROSCOPIC (W/UA)    Collection Time: 09/08/21 10:45 PM   Result Value Ref Range    WBC 10-20 (A) /hpf    RBC 10-20 (A) /hpf    Bacteria Negative None /hpf    Epithelial Cells Few /hpf    Hyaline Cast 3-5 (A) /lpf   CBC WITH DIFFERENTIAL    Collection Time: 09/08/21 11:01 PM   Result Value Ref Range    WBC 11.8 (H) 4.8 - 10.8 K/uL    RBC 4.00 (L) 4.20 - 5.40 M/uL    Hemoglobin 12.5 12.0 - 16.0 g/dL    Hematocrit 37.6 37.0 - 47.0 %    MCV 94.0 81.4 - 97.8 fL    MCH 31.3 27.0 - 33.0 pg    MCHC 33.2 (L) 33.6 - 35.0 g/dL    RDW 43.8 35.9 - 50.0 fL    Platelet Count 180 164 - 446 K/uL    MPV 12.3 9.0 - 12.9 fL    Neutrophils-Polys 84.10 (H) 44.00 - 72.00 %    Lymphocytes 11.80 (L) 22.00 - 41.00 %    Monocytes 3.20 0.00 - 13.40 %    Eosinophils 0.30 0.00 - 6.90 %    Basophils 0.30 0.00 - 1.80 %    Immature Granulocytes 0.30 0.00 - 0.90 %    Nucleated RBC 0.00 /100 WBC    Neutrophils (Absolute) 9.93 (H) 2.00 - 7.15 K/uL    Lymphs (Absolute) 1.39 1.00 - 4.80 K/uL    Monos (Absolute) 0.38 0.00 - 0.85 K/uL    Eos (Absolute) 0.04 0.00 - 0.51 K/uL    Baso (Absolute) 0.04 0.00 - 0.12 K/uL    Immature Granulocytes (abs) 0.03 0.00 - 0.11 K/uL    NRBC (Absolute) 0.00 K/uL   HOLD BLOOD BANK SPECIMEN (NOT TESTED)    Collection Time: 09/09/21  2:10 AM   Result Value Ref Range    Holding Tube - Bb DONE    Histology Request    Collection Time: 09/09/21  8:39 AM   Result Value Ref Range    Pathology Request Sent to [facility]    CBC WITH DIFFERENTIAL    Collection Time: 09/09/21  6:00 PM   Result Value Ref Range    WBC 9.1 4.8 - 10.8 K/uL    RBC 2.99 (L) 4.20 - 5.40 M/uL     Hemoglobin 9.6 (L) 12.0 - 16.0 g/dL    Hematocrit 29.1 (L) 37.0 - 47.0 %    MCV 97.3 81.4 - 97.8 fL    MCH 32.1 27.0 - 33.0 pg    MCHC 33.0 (L) 33.6 - 35.0 g/dL    RDW 46.1 35.9 - 50.0 fL    Platelet Count 166 164 - 446 K/uL    MPV 12.4 9.0 - 12.9 fL    Neutrophils-Polys 77.70 (H) 44.00 - 72.00 %    Lymphocytes 15.80 (L) 22.00 - 41.00 %    Monocytes 5.70 0.00 - 13.40 %    Eosinophils 0.20 0.00 - 6.90 %    Basophils 0.20 0.00 - 1.80 %    Immature Granulocytes 0.40 0.00 - 0.90 %    Nucleated RBC 0.00 /100 WBC    Neutrophils (Absolute) 7.04 2.00 - 7.15 K/uL    Lymphs (Absolute) 1.43 1.00 - 4.80 K/uL    Monos (Absolute) 0.52 0.00 - 0.85 K/uL    Eos (Absolute) 0.02 0.00 - 0.51 K/uL    Baso (Absolute) 0.02 0.00 - 0.12 K/uL    Immature Granulocytes (abs) 0.04 0.00 - 0.11 K/uL    NRBC (Absolute) 0.00 K/uL   Comp Metabolic Panel    Collection Time: 09/10/21  8:22 AM   Result Value Ref Range    Sodium 141 135 - 145 mmol/L    Potassium 3.8 3.6 - 5.5 mmol/L    Chloride 110 96 - 112 mmol/L    Co2 23 20 - 33 mmol/L    Anion Gap 8.0 7.0 - 16.0    Glucose 104 (H) 65 - 99 mg/dL    Bun 11 8 - 22 mg/dL    Creatinine 0.69 0.50 - 1.40 mg/dL    Calcium 8.1 (L) 8.5 - 10.5 mg/dL    AST(SGOT) 472 (H) 12 - 45 U/L    ALT(SGPT) 287 (H) 2 - 50 U/L    Alkaline Phosphatase 162 (H) 30 - 99 U/L    Total Bilirubin 1.4 0.1 - 1.5 mg/dL    Albumin 3.1 (L) 3.2 - 4.9 g/dL    Total Protein 5.5 (L) 6.0 - 8.2 g/dL    Globulin 2.4 1.9 - 3.5 g/dL    A-G Ratio 1.3 g/dL   CBC WITH DIFFERENTIAL    Collection Time: 09/10/21  8:22 AM   Result Value Ref Range    WBC 6.4 4.8 - 10.8 K/uL    RBC 2.85 (L) 4.20 - 5.40 M/uL    Hemoglobin 8.9 (L) 12.0 - 16.0 g/dL    Hematocrit 27.5 (L) 37.0 - 47.0 %    MCV 96.5 81.4 - 97.8 fL    MCH 31.2 27.0 - 33.0 pg    MCHC 32.4 (L) 33.6 - 35.0 g/dL    RDW 45.9 35.9 - 50.0 fL    Platelet Count 155 (L) 164 - 446 K/uL    MPV 12.8 9.0 - 12.9 fL    Neutrophils-Polys 54.00 44.00 - 72.00 %    Lymphocytes 36.80 22.00 - 41.00 %    Monocytes  7.60 0.00 - 13.40 %    Eosinophils 0.60 0.00 - 6.90 %    Basophils 0.50 0.00 - 1.80 %    Immature Granulocytes 0.50 0.00 - 0.90 %    Nucleated RBC 0.00 /100 WBC    Neutrophils (Absolute) 3.47 2.00 - 7.15 K/uL    Lymphs (Absolute) 2.36 1.00 - 4.80 K/uL    Monos (Absolute) 0.49 0.00 - 0.85 K/uL    Eos (Absolute) 0.04 0.00 - 0.51 K/uL    Baso (Absolute) 0.03 0.00 - 0.12 K/uL    Immature Granulocytes (abs) 0.03 0.00 - 0.11 K/uL    NRBC (Absolute) 0.00 K/uL   ESTIMATED GFR    Collection Time: 09/10/21  8:22 AM   Result Value Ref Range    GFR If African American >60 >60 mL/min/1.73 m 2    GFR If Non African American >60 >60 mL/min/1.73 m 2   LIPASE    Collection Time: 09/10/21  8:22 AM   Result Value Ref Range    Lipase 119 (H) 11 - 82 U/L   Comp Metabolic Panel    Collection Time: 09/11/21  4:37 AM   Result Value Ref Range    Sodium 141 135 - 145 mmol/L    Potassium 3.7 3.6 - 5.5 mmol/L    Chloride 107 96 - 112 mmol/L    Co2 25 20 - 33 mmol/L    Anion Gap 9.0 7.0 - 16.0    Glucose 98 65 - 99 mg/dL    Bun 13 8 - 22 mg/dL    Creatinine 0.78 0.50 - 1.40 mg/dL    Calcium 8.3 (L) 8.5 - 10.5 mg/dL    AST(SGOT) 152 (H) 12 - 45 U/L    ALT(SGPT) 236 (H) 2 - 50 U/L    Alkaline Phosphatase 159 (H) 30 - 99 U/L    Total Bilirubin 0.2 0.1 - 1.5 mg/dL    Albumin 3.2 3.2 - 4.9 g/dL    Total Protein 5.5 (L) 6.0 - 8.2 g/dL    Globulin 2.3 1.9 - 3.5 g/dL    A-G Ratio 1.4 g/dL   CBC WITH DIFFERENTIAL    Collection Time: 09/11/21  4:37 AM   Result Value Ref Range    WBC 5.5 4.8 - 10.8 K/uL    RBC 3.02 (L) 4.20 - 5.40 M/uL    Hemoglobin 9.5 (L) 12.0 - 16.0 g/dL    Hematocrit 29.3 (L) 37.0 - 47.0 %    MCV 97.0 81.4 - 97.8 fL    MCH 31.5 27.0 - 33.0 pg    MCHC 32.4 (L) 33.6 - 35.0 g/dL    RDW 46.4 35.9 - 50.0 fL    Platelet Count 148 (L) 164 - 446 K/uL    MPV 12.7 9.0 - 12.9 fL    Neutrophils-Polys 42.00 (L) 44.00 - 72.00 %    Lymphocytes 47.70 (H) 22.00 - 41.00 %    Monocytes 6.00 0.00 - 13.40 %    Eosinophils 3.20 0.00 - 6.90 %    Basophils  0.70 0.00 - 1.80 %    Immature Granulocytes 0.40 0.00 - 0.90 %    Nucleated RBC 0.00 /100 WBC    Neutrophils (Absolute) 2.33 2.00 - 7.15 K/uL    Lymphs (Absolute) 2.64 1.00 - 4.80 K/uL    Monos (Absolute) 0.33 0.00 - 0.85 K/uL    Eos (Absolute) 0.18 0.00 - 0.51 K/uL    Baso (Absolute) 0.04 0.00 - 0.12 K/uL    Immature Granulocytes (abs) 0.02 0.00 - 0.11 K/uL    NRBC (Absolute) 0.00 K/uL   LIPASE    Collection Time: 09/11/21  4:37 AM   Result Value Ref Range    Lipase 65 11 - 82 U/L   ESTIMATED GFR    Collection Time: 09/11/21  4:37 AM   Result Value Ref Range    GFR If African American >60 >60 mL/min/1.73 m 2    GFR If Non African American >60 >60 mL/min/1.73 m 2   Histology Request    Collection Time: 09/13/21  7:20 AM   Result Value Ref Range    Pathology Request Sent to [facility]        PLAN:  Continue any current medications.  (See Med List for details.)  Return to clinic  : in 3 week(s).  For postpartum exam.  Did counseling regarding contraception today.  Given brochure for IUD.  Patient is considering Mirena IUD.  She is breast-feeding.

## 2021-10-11 ENCOUNTER — POST PARTUM (OUTPATIENT)
Dept: OBGYN | Facility: CLINIC | Age: 26
End: 2021-10-11
Payer: COMMERCIAL

## 2021-10-11 VITALS — BODY MASS INDEX: 32.67 KG/M2 | WEIGHT: 151 LBS | SYSTOLIC BLOOD PRESSURE: 102 MMHG | DIASTOLIC BLOOD PRESSURE: 60 MMHG

## 2021-10-11 DIAGNOSIS — E28.2 PCOS (POLYCYSTIC OVARIAN SYNDROME): ICD-10-CM

## 2021-10-11 PROBLEM — Z12.4 SCREENING FOR MALIGNANT NEOPLASM OF CERVIX: Status: ACTIVE | Noted: 2021-10-11

## 2021-10-11 PROBLEM — O26.619 CHOLELITHIASIS AFFECTING PREGNANCY, ANTEPARTUM: Status: RESOLVED | Noted: 2021-05-17 | Resolved: 2021-10-11

## 2021-10-11 PROBLEM — K80.20 CHOLELITHIASIS AFFECTING PREGNANCY, ANTEPARTUM: Status: RESOLVED | Noted: 2021-05-17 | Resolved: 2021-10-11

## 2021-10-11 PROBLEM — Z86.2 HISTORY OF THROMBOCYTOPENIA: Status: RESOLVED | Noted: 2021-05-07 | Resolved: 2021-10-11

## 2021-10-11 PROCEDURE — 90050 PR POSTPARTUM VISIT: CPT | Performed by: OBSTETRICS & GYNECOLOGY

## 2021-10-11 ASSESSMENT — EDINBURGH POSTNATAL DEPRESSION SCALE (EPDS)
I HAVE BEEN ABLE TO LAUGH AND SEE THE FUNNY SIDE OF THINGS: AS MUCH AS I ALWAYS COULD
I HAVE LOOKED FORWARD WITH ENJOYMENT TO THINGS: AS MUCH AS I EVER DID
I HAVE FELT SCARED OR PANICKY FOR NO GOOD REASON: NO, NOT AT ALL
I HAVE BEEN SO UNHAPPY THAT I HAVE BEEN CRYING: NO, NEVER
I HAVE BEEN ANXIOUS OR WORRIED FOR NO GOOD REASON: NO, NOT AT ALL
I HAVE BEEN SO UNHAPPY THAT I HAVE HAD DIFFICULTY SLEEPING: NOT AT ALL
TOTAL SCORE: 0
THE THOUGHT OF HARMING MYSELF HAS OCCURRED TO ME: NEVER
THINGS HAVE BEEN GETTING ON TOP OF ME: NO, I HAVE BEEN COPING AS WELL AS EVER
I HAVE FELT SAD OR MISERABLE: NO, NOT AT ALL
I HAVE BLAMED MYSELF UNNECESSARILY WHEN THINGS WENT WRONG: NO, NEVER

## 2021-10-11 ASSESSMENT — FIBROSIS 4 INDEX: FIB4 SCORE: 1.67

## 2021-10-11 NOTE — NON-PROVIDER
Pt here today for postpartum exam.  Delivery type: vaginal   Currently :breast feeding  Desired BCM: none  LMP: not yet   Last pap: 2/16/21  Phone # [phone]  Pharmacy verified   EPDS 0

## 2021-10-11 NOTE — PROGRESS NOTES
POST PARTUM VISIT NOTE    SUBJECTIVE:  Nataliia Falk is a 26 y.o.  who presents for postpartum exam.     I have reviewed the prenatal and intrapartum course    Date of delivery :  21  Type of delivery:      ROS:  She feels well healed.   She is getting along well with her partner  She reports her mood is good. Denies postpartum blues.   She has not had sex.   Denies urinary or stool incontinence.    Last pap date: 2021- wnl     Perineum: no lacerations  Feeding: breast almost exclusively; some formula supplementation  Postpartum course: complicated by retained POC requiring D&C and had gallbladder removed at the same time.  Contraceptive plans: condom use for now    I have reviewed the patient's PMH for contraceptive risk factors, and she has no contraindications to contraception as planned.     OBJECTIVE:    Vitals:    10/11/21 1041   BP: 102/60       Appears well, vital signs normal.  Breast Exam: exam deferred.  Perineum: perineum intact.  Abdomen: soft, nontender  Laparoscopic incisions well healed    Pelvic Exam: deferred    EPDS score:  0    ASSESSMENT:  Normal pp visit    PLAN:  RTO for annual exam due in 4 months (February)    Contraception planned:  Condoms. See explanation below    PCOS: Pt states she has PCOS and would like to see what her body does not that she's had a baby. She reports being lowest weight she has been, including lower than before when she was trying to conceive. She reports being on metformin 500mg BID given by [facility] and got pregnant with femara/HCG trigger.  At that workup she had elevated fasting insulin but did not have GDMin pregnancy.  Discussed that postpartum while breastfeeding may be challenging to really tell if/when she begins ovulation as that can be variable with breastfeeding and of course with PCOS.  Discussed how contraception can help with hormone regulation that is abberant with PCOS.  Nevertheless, I support her decision to be off  contraception for now but previously she went 3 years without menses.  Discussed that IUD would also be uterine protective.      Encouraged continued diet, exercise and wt loss as BMI is still 32.  Will restart metformin for PCOS metabolic treatment and optimization for wt loss.     Return in February for annual exam.      Tanisha Denton D.O.

## 2022-10-16 ENCOUNTER — APPOINTMENT (OUTPATIENT)
Dept: RADIOLOGY | Facility: MEDICAL CENTER | Age: 27
DRG: 831 | End: 2022-10-16

## 2022-10-16 ENCOUNTER — HOSPITAL ENCOUNTER (INPATIENT)
Facility: MEDICAL CENTER | Age: 27
LOS: 2 days | DRG: 831 | End: 2022-10-18
Attending: EMERGENCY MEDICINE | Admitting: INTERNAL MEDICINE

## 2022-10-16 DIAGNOSIS — R00.0 TACHYCARDIA, UNSPECIFIED: ICD-10-CM

## 2022-10-16 DIAGNOSIS — R07.89 CHEST WALL PAIN: ICD-10-CM

## 2022-10-16 DIAGNOSIS — J10.1 INFLUENZA A: ICD-10-CM

## 2022-10-16 PROBLEM — A41.9 SEPSIS (HCC): Status: ACTIVE | Noted: 2022-10-16

## 2022-10-16 LAB
ALBUMIN SERPL BCP-MCNC: 3.9 G/DL (ref 3.2–4.9)
ALBUMIN/GLOB SERPL: 1.3 G/DL
ALP SERPL-CCNC: 79 U/L (ref 30–99)
ALT SERPL-CCNC: 28 U/L (ref 2–50)
ANION GAP SERPL CALC-SCNC: 11 MMOL/L (ref 7–16)
APPEARANCE UR: CLEAR
AST SERPL-CCNC: 37 U/L (ref 12–45)
BASOPHILS # BLD AUTO: 0.2 % (ref 0–1.8)
BASOPHILS # BLD: 0.02 K/UL (ref 0–0.12)
BILIRUB SERPL-MCNC: 0.5 MG/DL (ref 0.1–1.5)
BILIRUB UR QL STRIP.AUTO: NEGATIVE
BUN SERPL-MCNC: 5 MG/DL (ref 8–22)
CALCIUM SERPL-MCNC: 8.9 MG/DL (ref 8.5–10.5)
CHLORIDE SERPL-SCNC: 106 MMOL/L (ref 96–112)
CO2 SERPL-SCNC: 18 MMOL/L (ref 20–33)
COLOR UR: YELLOW
CREAT SERPL-MCNC: 0.43 MG/DL (ref 0.5–1.4)
EOSINOPHIL # BLD AUTO: 0.01 K/UL (ref 0–0.51)
EOSINOPHIL NFR BLD: 0.1 % (ref 0–6.9)
ERYTHROCYTE [DISTWIDTH] IN BLOOD BY AUTOMATED COUNT: 48.5 FL (ref 35.9–50)
FLUAV RNA SPEC QL NAA+PROBE: POSITIVE
FLUBV RNA SPEC QL NAA+PROBE: NEGATIVE
GFR SERPLBLD CREATININE-BSD FMLA CKD-EPI: 137 ML/MIN/1.73 M 2
GLOBULIN SER CALC-MCNC: 2.9 G/DL (ref 1.9–3.5)
GLUCOSE SERPL-MCNC: 94 MG/DL (ref 65–99)
GLUCOSE UR STRIP.AUTO-MCNC: NEGATIVE MG/DL
HCT VFR BLD AUTO: 34 % (ref 37–47)
HGB BLD-MCNC: 11.8 G/DL (ref 12–16)
IMM GRANULOCYTES # BLD AUTO: 0.13 K/UL (ref 0–0.11)
IMM GRANULOCYTES NFR BLD AUTO: 1.6 % (ref 0–0.9)
KETONES UR STRIP.AUTO-MCNC: NEGATIVE MG/DL
LACTATE SERPL-SCNC: 1.3 MMOL/L (ref 0.5–2)
LACTATE SERPL-SCNC: 1.6 MMOL/L (ref 0.5–2)
LEUKOCYTE ESTERASE UR QL STRIP.AUTO: NEGATIVE
LYMPHOCYTES # BLD AUTO: 0.86 K/UL (ref 1–4.8)
LYMPHOCYTES NFR BLD: 10.7 % (ref 22–41)
MCH RBC QN AUTO: 33.6 PG (ref 27–33)
MCHC RBC AUTO-ENTMCNC: 34.7 G/DL (ref 33.6–35)
MCV RBC AUTO: 96.9 FL (ref 81.4–97.8)
MICRO URNS: NORMAL
MONOCYTES # BLD AUTO: 0.64 K/UL (ref 0–0.85)
MONOCYTES NFR BLD AUTO: 8 % (ref 0–13.4)
NEUTROPHILS # BLD AUTO: 6.35 K/UL (ref 2–7.15)
NEUTROPHILS NFR BLD: 79.4 % (ref 44–72)
NITRITE UR QL STRIP.AUTO: NEGATIVE
NRBC # BLD AUTO: 0 K/UL
NRBC BLD-RTO: 0 /100 WBC
PH UR STRIP.AUTO: 6.5 [PH] (ref 5–8)
PLATELET # BLD AUTO: 91 K/UL (ref 164–446)
PMV BLD AUTO: 13.4 FL (ref 9–12.9)
POTASSIUM SERPL-SCNC: 3.3 MMOL/L (ref 3.6–5.5)
PROT SERPL-MCNC: 6.8 G/DL (ref 6–8.2)
PROT UR QL STRIP: NEGATIVE MG/DL
RBC # BLD AUTO: 3.51 M/UL (ref 4.2–5.4)
RBC UR QL AUTO: NEGATIVE
RSV RNA SPEC QL NAA+PROBE: NEGATIVE
SARS-COV-2 RNA RESP QL NAA+PROBE: NOTDETECTED
SODIUM SERPL-SCNC: 135 MMOL/L (ref 135–145)
SP GR UR STRIP.AUTO: 1
SPECIMEN SOURCE: ABNORMAL
UROBILINOGEN UR STRIP.AUTO-MCNC: 0.2 MG/DL
WBC # BLD AUTO: 8 K/UL (ref 4.8–10.8)

## 2022-10-16 PROCEDURE — 83605 ASSAY OF LACTIC ACID: CPT | Mod: 91

## 2022-10-16 PROCEDURE — A9270 NON-COVERED ITEM OR SERVICE: HCPCS | Performed by: EMERGENCY MEDICINE

## 2022-10-16 PROCEDURE — 81003 URINALYSIS AUTO W/O SCOPE: CPT

## 2022-10-16 PROCEDURE — 99285 EMERGENCY DEPT VISIT HI MDM: CPT

## 2022-10-16 PROCEDURE — 700102 HCHG RX REV CODE 250 W/ 637 OVERRIDE(OP): Performed by: EMERGENCY MEDICINE

## 2022-10-16 PROCEDURE — 85025 COMPLETE CBC W/AUTO DIFF WBC: CPT

## 2022-10-16 PROCEDURE — 94760 N-INVAS EAR/PLS OXIMETRY 1: CPT

## 2022-10-16 PROCEDURE — C9803 HOPD COVID-19 SPEC COLLECT: HCPCS | Performed by: EMERGENCY MEDICINE

## 2022-10-16 PROCEDURE — 0241U HCHG SARS-COV-2 COVID-19 NFCT DS RESP RNA 4 TRGT MIC: CPT

## 2022-10-16 PROCEDURE — 87086 URINE CULTURE/COLONY COUNT: CPT

## 2022-10-16 PROCEDURE — 87040 BLOOD CULTURE FOR BACTERIA: CPT | Mod: 91

## 2022-10-16 PROCEDURE — 770020 HCHG ROOM/CARE - TELE (206)

## 2022-10-16 PROCEDURE — 700105 HCHG RX REV CODE 258: Performed by: EMERGENCY MEDICINE

## 2022-10-16 PROCEDURE — 80053 COMPREHEN METABOLIC PANEL: CPT

## 2022-10-16 PROCEDURE — 36415 COLL VENOUS BLD VENIPUNCTURE: CPT

## 2022-10-16 PROCEDURE — 8E0ZXY6 ISOLATION: ICD-10-PCS | Performed by: EMERGENCY MEDICINE

## 2022-10-16 RX ORDER — ALBUTEROL SULFATE 90 UG/1
4 AEROSOL, METERED RESPIRATORY (INHALATION)
Status: COMPLETED | OUTPATIENT
Start: 2022-10-16 | End: 2022-10-16

## 2022-10-16 RX ORDER — ENOXAPARIN SODIUM 100 MG/ML
40 INJECTION SUBCUTANEOUS DAILY
Status: DISCONTINUED | OUTPATIENT
Start: 2022-10-17 | End: 2022-10-18 | Stop reason: HOSPADM

## 2022-10-16 RX ORDER — BISACODYL 10 MG
10 SUPPOSITORY, RECTAL RECTAL
Status: DISCONTINUED | OUTPATIENT
Start: 2022-10-16 | End: 2022-10-18 | Stop reason: HOSPADM

## 2022-10-16 RX ORDER — OSELTAMIVIR PHOSPHATE 75 MG/1
75 CAPSULE ORAL 2 TIMES DAILY
Status: DISCONTINUED | OUTPATIENT
Start: 2022-10-17 | End: 2022-10-18 | Stop reason: HOSPADM

## 2022-10-16 RX ORDER — AMOXICILLIN 250 MG
2 CAPSULE ORAL 2 TIMES DAILY
Status: DISCONTINUED | OUTPATIENT
Start: 2022-10-17 | End: 2022-10-18 | Stop reason: HOSPADM

## 2022-10-16 RX ORDER — SODIUM CHLORIDE 9 MG/ML
1000 INJECTION, SOLUTION INTRAVENOUS ONCE
Status: COMPLETED | OUTPATIENT
Start: 2022-10-16 | End: 2022-10-16

## 2022-10-16 RX ORDER — SODIUM CHLORIDE 9 MG/ML
INJECTION, SOLUTION INTRAVENOUS CONTINUOUS
Status: DISCONTINUED | OUTPATIENT
Start: 2022-10-17 | End: 2022-10-18 | Stop reason: HOSPADM

## 2022-10-16 RX ORDER — OSELTAMIVIR PHOSPHATE 75 MG/1
75 CAPSULE ORAL ONCE
Status: COMPLETED | OUTPATIENT
Start: 2022-10-16 | End: 2022-10-16

## 2022-10-16 RX ORDER — SODIUM CHLORIDE, SODIUM LACTATE, POTASSIUM CHLORIDE, AND CALCIUM CHLORIDE .6; .31; .03; .02 G/100ML; G/100ML; G/100ML; G/100ML
30 INJECTION, SOLUTION INTRAVENOUS ONCE
Status: COMPLETED | OUTPATIENT
Start: 2022-10-16 | End: 2022-10-16

## 2022-10-16 RX ORDER — ACETAMINOPHEN 325 MG/1
650 TABLET ORAL ONCE
Status: COMPLETED | OUTPATIENT
Start: 2022-10-16 | End: 2022-10-16

## 2022-10-16 RX ORDER — POLYETHYLENE GLYCOL 3350 17 G/17G
1 POWDER, FOR SOLUTION ORAL
Status: DISCONTINUED | OUTPATIENT
Start: 2022-10-16 | End: 2022-10-18 | Stop reason: HOSPADM

## 2022-10-16 RX ADMIN — ACETAMINOPHEN 650 MG: 325 TABLET, FILM COATED ORAL at 22:36

## 2022-10-16 RX ADMIN — SODIUM CHLORIDE, POTASSIUM CHLORIDE, SODIUM LACTATE AND CALCIUM CHLORIDE 2328 ML: 600; 310; 30; 20 INJECTION, SOLUTION INTRAVENOUS at 20:17

## 2022-10-16 RX ADMIN — OSELTAMIVIR PHOSPHATE 75 MG: 75 CAPSULE ORAL at 20:17

## 2022-10-16 RX ADMIN — ALBUTEROL SULFATE 4 PUFF: 90 AEROSOL, METERED RESPIRATORY (INHALATION) at 18:14

## 2022-10-16 RX ADMIN — ACETAMINOPHEN 650 MG: 325 TABLET, FILM COATED ORAL at 18:06

## 2022-10-16 RX ADMIN — SODIUM CHLORIDE 1000 ML: 9 INJECTION, SOLUTION INTRAVENOUS at 17:57

## 2022-10-16 ASSESSMENT — FIBROSIS 4 INDEX: FIB4 SCORE: 1.74

## 2022-10-17 PROBLEM — E87.6 HYPOKALEMIA: Status: ACTIVE | Noted: 2022-10-17

## 2022-10-17 PROBLEM — Z3A.20 20 WEEKS GESTATION OF PREGNANCY: Status: ACTIVE | Noted: 2022-10-17

## 2022-10-17 PROBLEM — J10.1 INFLUENZA A: Status: ACTIVE | Noted: 2022-10-17

## 2022-10-17 PROBLEM — R00.0 TACHYCARDIA: Status: ACTIVE | Noted: 2022-10-17

## 2022-10-17 LAB
ANION GAP SERPL CALC-SCNC: 12 MMOL/L (ref 7–16)
BASOPHILS # BLD AUTO: 0.4 % (ref 0–1.8)
BASOPHILS # BLD: 0.03 K/UL (ref 0–0.12)
BUN SERPL-MCNC: 3 MG/DL (ref 8–22)
CALCIUM SERPL-MCNC: 8.4 MG/DL (ref 8.5–10.5)
CHLORIDE SERPL-SCNC: 110 MMOL/L (ref 96–112)
CO2 SERPL-SCNC: 19 MMOL/L (ref 20–33)
CREAT SERPL-MCNC: 0.41 MG/DL (ref 0.5–1.4)
CRP SERPL HS-MCNC: 4.62 MG/DL (ref 0–0.75)
EKG IMPRESSION: NORMAL
EOSINOPHIL # BLD AUTO: 0.01 K/UL (ref 0–0.51)
EOSINOPHIL NFR BLD: 0.1 % (ref 0–6.9)
ERYTHROCYTE [DISTWIDTH] IN BLOOD BY AUTOMATED COUNT: 49.8 FL (ref 35.9–50)
GFR SERPLBLD CREATININE-BSD FMLA CKD-EPI: 139 ML/MIN/1.73 M 2
GLUCOSE SERPL-MCNC: 98 MG/DL (ref 65–99)
HCT VFR BLD AUTO: 33.9 % (ref 37–47)
HGB BLD-MCNC: 11.5 G/DL (ref 12–16)
IMM GRANULOCYTES # BLD AUTO: 0.11 K/UL (ref 0–0.11)
IMM GRANULOCYTES NFR BLD AUTO: 1.5 % (ref 0–0.9)
INR PPP: 1.11 (ref 0.87–1.13)
LACTATE SERPL-SCNC: 1 MMOL/L (ref 0.5–2)
LACTATE SERPL-SCNC: 2.7 MMOL/L (ref 0.5–2)
LYMPHOCYTES # BLD AUTO: 1.1 K/UL (ref 1–4.8)
LYMPHOCYTES NFR BLD: 14.7 % (ref 22–41)
MAGNESIUM SERPL-MCNC: 1.7 MG/DL (ref 1.5–2.5)
MCH RBC QN AUTO: 33.2 PG (ref 27–33)
MCHC RBC AUTO-ENTMCNC: 33.9 G/DL (ref 33.6–35)
MCV RBC AUTO: 98 FL (ref 81.4–97.8)
MONOCYTES # BLD AUTO: 0.69 K/UL (ref 0–0.85)
MONOCYTES NFR BLD AUTO: 9.2 % (ref 0–13.4)
NEUTROPHILS # BLD AUTO: 5.54 K/UL (ref 2–7.15)
NEUTROPHILS NFR BLD: 74.1 % (ref 44–72)
NRBC # BLD AUTO: 0 K/UL
NRBC BLD-RTO: 0 /100 WBC
PLATELET # BLD AUTO: 78 K/UL (ref 164–446)
PMV BLD AUTO: 13.3 FL (ref 9–12.9)
POTASSIUM SERPL-SCNC: 3.2 MMOL/L (ref 3.6–5.5)
PROCALCITONIN SERPL-MCNC: 0.1 NG/ML
PROTHROMBIN TIME: 14.1 SEC (ref 12–14.6)
RBC # BLD AUTO: 3.46 M/UL (ref 4.2–5.4)
SODIUM SERPL-SCNC: 141 MMOL/L (ref 135–145)
WBC # BLD AUTO: 7.5 K/UL (ref 4.8–10.8)

## 2022-10-17 PROCEDURE — A9270 NON-COVERED ITEM OR SERVICE: HCPCS | Performed by: HOSPITALIST

## 2022-10-17 PROCEDURE — A9270 NON-COVERED ITEM OR SERVICE: HCPCS

## 2022-10-17 PROCEDURE — 85025 COMPLETE CBC W/AUTO DIFF WBC: CPT

## 2022-10-17 PROCEDURE — 86140 C-REACTIVE PROTEIN: CPT

## 2022-10-17 PROCEDURE — 83735 ASSAY OF MAGNESIUM: CPT

## 2022-10-17 PROCEDURE — 80048 BASIC METABOLIC PNL TOTAL CA: CPT

## 2022-10-17 PROCEDURE — 700102 HCHG RX REV CODE 250 W/ 637 OVERRIDE(OP): Performed by: HOSPITALIST

## 2022-10-17 PROCEDURE — 93005 ELECTROCARDIOGRAM TRACING: CPT | Performed by: INTERNAL MEDICINE

## 2022-10-17 PROCEDURE — 96372 THER/PROPH/DIAG INJ SC/IM: CPT

## 2022-10-17 PROCEDURE — 84145 PROCALCITONIN (PCT): CPT

## 2022-10-17 PROCEDURE — 700111 HCHG RX REV CODE 636 W/ 250 OVERRIDE (IP): Performed by: INTERNAL MEDICINE

## 2022-10-17 PROCEDURE — 83605 ASSAY OF LACTIC ACID: CPT

## 2022-10-17 PROCEDURE — A9270 NON-COVERED ITEM OR SERVICE: HCPCS | Performed by: INTERNAL MEDICINE

## 2022-10-17 PROCEDURE — 36415 COLL VENOUS BLD VENIPUNCTURE: CPT

## 2022-10-17 PROCEDURE — 700102 HCHG RX REV CODE 250 W/ 637 OVERRIDE(OP)

## 2022-10-17 PROCEDURE — 770020 HCHG ROOM/CARE - TELE (206)

## 2022-10-17 PROCEDURE — 700102 HCHG RX REV CODE 250 W/ 637 OVERRIDE(OP): Performed by: INTERNAL MEDICINE

## 2022-10-17 PROCEDURE — 99233 SBSQ HOSP IP/OBS HIGH 50: CPT | Performed by: HOSPITALIST

## 2022-10-17 PROCEDURE — 85610 PROTHROMBIN TIME: CPT

## 2022-10-17 PROCEDURE — 700105 HCHG RX REV CODE 258: Performed by: INTERNAL MEDICINE

## 2022-10-17 RX ORDER — ACETAMINOPHEN 325 MG/1
650 TABLET ORAL EVERY 4 HOURS PRN
Status: DISCONTINUED | OUTPATIENT
Start: 2022-10-17 | End: 2022-10-18 | Stop reason: HOSPADM

## 2022-10-17 RX ORDER — POTASSIUM CHLORIDE 20 MEQ/1
40 TABLET, EXTENDED RELEASE ORAL ONCE
Status: COMPLETED | OUTPATIENT
Start: 2022-10-17 | End: 2022-10-17

## 2022-10-17 RX ORDER — POTASSIUM CHLORIDE 20 MEQ/1
20 TABLET, EXTENDED RELEASE ORAL ONCE
Status: DISCONTINUED | OUTPATIENT
Start: 2022-10-17 | End: 2022-10-17

## 2022-10-17 RX ADMIN — SODIUM CHLORIDE: 9 INJECTION, SOLUTION INTRAVENOUS at 12:27

## 2022-10-17 RX ADMIN — ACETAMINOPHEN 650 MG: 325 TABLET, FILM COATED ORAL at 17:34

## 2022-10-17 RX ADMIN — POTASSIUM CHLORIDE 40 MEQ: 1500 TABLET, EXTENDED RELEASE ORAL at 15:48

## 2022-10-17 RX ADMIN — ENOXAPARIN SODIUM 40 MG: 40 INJECTION SUBCUTANEOUS at 02:09

## 2022-10-17 RX ADMIN — SENNOSIDES AND DOCUSATE SODIUM 2 TABLET: 50; 8.6 TABLET ORAL at 17:34

## 2022-10-17 RX ADMIN — ACETAMINOPHEN 650 MG: 325 TABLET, FILM COATED ORAL at 11:05

## 2022-10-17 RX ADMIN — ACETAMINOPHEN 650 MG: 325 TABLET, FILM COATED ORAL at 07:08

## 2022-10-17 RX ADMIN — ENOXAPARIN SODIUM 40 MG: 40 INJECTION SUBCUTANEOUS at 17:34

## 2022-10-17 RX ADMIN — SODIUM CHLORIDE: 9 INJECTION, SOLUTION INTRAVENOUS at 02:08

## 2022-10-17 RX ADMIN — OSELTAMIVIR PHOSPHATE 75 MG: 75 CAPSULE ORAL at 06:36

## 2022-10-17 ASSESSMENT — ENCOUNTER SYMPTOMS
BLURRED VISION: 0
MUSCULOSKELETAL NEGATIVE: 1
CHILLS: 1
CARDIOVASCULAR NEGATIVE: 1
VOMITING: 0
CLAUDICATION: 0
CONSTIPATION: 0
CHILLS: 0
SORE THROAT: 0
FEVER: 1
BRUISES/BLEEDS EASILY: 0
PHOTOPHOBIA: 0
SHORTNESS OF BREATH: 0
HEADACHES: 1
HEMOPTYSIS: 0
MYALGIAS: 0
EYES NEGATIVE: 1
FOCAL WEAKNESS: 0
PSYCHIATRIC NEGATIVE: 1
DOUBLE VISION: 0
DIARRHEA: 0
ABDOMINAL PAIN: 0
ORTHOPNEA: 0
SHORTNESS OF BREATH: 1
PALPITATIONS: 0
GASTROINTESTINAL NEGATIVE: 1
DIAPHORESIS: 0
SPEECH CHANGE: 0
DEPRESSION: 0
WEAKNESS: 0
COUGH: 1
DIZZINESS: 0
NAUSEA: 0
WEIGHT LOSS: 0
NECK PAIN: 0

## 2022-10-17 ASSESSMENT — PATIENT HEALTH QUESTIONNAIRE - PHQ9
2. FEELING DOWN, DEPRESSED, IRRITABLE, OR HOPELESS: NOT AT ALL
SUM OF ALL RESPONSES TO PHQ9 QUESTIONS 1 AND 2: 0
1. LITTLE INTEREST OR PLEASURE IN DOING THINGS: NOT AT ALL

## 2022-10-17 ASSESSMENT — LIFESTYLE VARIABLES: SUBSTANCE_ABUSE: 0

## 2022-10-17 ASSESSMENT — PAIN DESCRIPTION - PAIN TYPE: TYPE: ACUTE PAIN

## 2022-10-17 ASSESSMENT — FIBROSIS 4 INDEX: FIB4 SCORE: 2.33

## 2022-10-17 NOTE — ED TRIAGE NOTES
"Nataliia Falk  27 y.o.  female  Chief Complaint   Patient presents with   • Flu Like Symptoms     Cough/cold sx since yesterday evening, + headache. \"Burning\" chest pain constant, exacerbated by coughing, + headache. No N/V. Family member at home recently ill with similar symptoms - tested COVID neg.   • UTI     Burning with urination, frequency       Patient educated on triage process, to alert staff if any changes in condition. Pt is 25 weeks pregnant. Had an U/S at 20 weeks.    Labs ordered. Last dose of tylenol at 1400. Pt reports fevers on & off - 100.5 F - 103 F.  "

## 2022-10-17 NOTE — ASSESSMENT & PLAN NOTE
Improving  Continue iv fluids  Likely related to dehydration and infection  Lactic acid is normal  IV fluid telemetry and close monitoring  following

## 2022-10-17 NOTE — ASSESSMENT & PLAN NOTE
Acute on chronic  Currently down to 78  Had transiently been in the 80's previously   Following  No s/o bleeding  Will recheck in am

## 2022-10-17 NOTE — H&P
"Hospital Medicine History & Physical Note    Date of Service  10/16/2022    Primary Care Physician  Pcp Pt States None    Consultants  None     Code Status  Full Code    Chief Complaint  Chief Complaint   Patient presents with    Flu Like Symptoms     Cough/cold sx since yesterday evening, + headache. \"Burning\" chest pain constant, exacerbated by coughing, + headache. No N/V. Family member at home recently ill with similar symptoms - tested COVID neg.    UTI     Burning with urination, frequency       History of Presenting Illness    27-year-old female with no significant past medical history who presented 10/17 with shortness of breath and fever.  Patient is at her 25 weeks pregnancy, she started having shortness of breath with a dry cough in the last couple days with fever and chills and generalized weakness also she started having palpitation, denied other symptoms on admission vital signs showed tachycardia with hypertension, lactic acid was normal, no leukocytosis, flu test was positive and UA did not show any white blood cell.  IV fluid with Tamiflu were started.  Patient did not need oxygen therapy and her saturation more than 90% on room air.    I discussed the plan of care with patient and bedside RN.    Review of Systems  Review of Systems   Constitutional:  Positive for chills and fever. Negative for weight loss.   HENT:  Negative for ear pain, hearing loss and tinnitus.    Eyes:  Negative for blurred vision, double vision and photophobia.   Respiratory:  Positive for cough and shortness of breath. Negative for hemoptysis.    Cardiovascular:  Negative for chest pain, palpitations, orthopnea and claudication.   Gastrointestinal:  Negative for abdominal pain, constipation, diarrhea, nausea and vomiting.   Genitourinary:  Negative for dysuria, frequency and urgency.   Musculoskeletal:  Negative for myalgias and neck pain.   Skin:  Negative for rash.   Neurological:  Negative for dizziness, speech change and " weakness.     Past Medical History   has a past medical history of Anemia (10/16/2012), Decreased platelet count (HCC) (6/20/2012), and Pregnancy.    Surgical History   has a past surgical history that includes dilation and curettage (N/A, 9/9/2021) and molly by laparoscopy (9/11/2021).     Family History  family history includes Cancer in her paternal grandfather and paternal grandmother.   Family history reviewed with patient. There is no family history that is pertinent to the chief complaint.     Social History   reports that she has never smoked. She has never used smokeless tobacco. She reports that she does not currently use drugs. She reports that she does not drink alcohol.    Allergies  No Known Allergies    Medications  Prior to Admission Medications   Prescriptions Last Dose Informant Patient Reported? Taking?   metFORMIN (GLUCOPHAGE) 500 MG Tab   No No   Sig: Take 1 Tablet by mouth 2 times a day with meals.      Facility-Administered Medications: None       Physical Exam  Temp:  [36.3 °C (97.4 °F)-37.9 °C (100.2 °F)] 37.4 °C (99.3 °F)  Pulse:  [114-134] 122  Resp:  [18-31] 22  BP: ()/(51-76) 112/63  SpO2:  [94 %-98 %] 96 %  Blood Pressure: (!) 86/52   Temperature: 37.6 °C (99.6 °F)   Pulse: (!) 122   Respiration: (!) 24   Pulse Oximetry: 96 %       Physical Exam  Constitutional:       Appearance: She is not ill-appearing.   HENT:      Head: Normocephalic and atraumatic.   Eyes:      General: No scleral icterus.  Cardiovascular:      Rate and Rhythm: Tachycardia present.      Heart sounds: No murmur heard.  Pulmonary:      Effort: No respiratory distress.      Breath sounds: No wheezing or rales.   Abdominal:      General: There is no distension.      Tenderness: There is no abdominal tenderness.      Comments: Pregnancy   Musculoskeletal:      Right lower leg: No edema.      Left lower leg: No edema.   Skin:     Coloration: Skin is not jaundiced.      Findings: No bruising, lesion or rash.    Neurological:      Mental Status: She is alert and oriented to person, place, and time. Mental status is at baseline.      Cranial Nerves: No cranial nerve deficit.      Motor: No weakness.       Laboratory:  Recent Labs     10/16/22  1717   WBC 8.0   RBC 3.51*   HEMOGLOBIN 11.8*   HEMATOCRIT 34.0*   MCV 96.9   MCH 33.6*   MCHC 34.7   RDW 48.5   PLATELETCT 91*   MPV 13.4*     Recent Labs     10/16/22  1717   SODIUM 135   POTASSIUM 3.3*   CHLORIDE 106   CO2 18*   GLUCOSE 94   BUN 5*   CREATININE 0.43*   CALCIUM 8.9     Recent Labs     10/16/22  1717   ALTSGPT 28   ASTSGOT 37   ALKPHOSPHAT 79   TBILIRUBIN 0.5   GLUCOSE 94         No results for input(s): NTPROBNP in the last 72 hours.      No results for input(s): TROPONINT in the last 72 hours.    Imaging:  No orders to display       EKG:  I have personally reviewed the images and compared with prior images.    Assessment/Plan:  Justification for Admission Status  I anticipate this patient will require at least two midnights for appropriate medical management, necessitating inpatient admission because sepsis and flu infection    Patient will need a Telemetry bed on CARDIOLOGY service .  The need is secondary to sepsis and tachycardia    * Influenza A  Assessment & Plan  With fever chills and coughing  No crackles and no signs of pneumonia, no need for chest x-ray since patient is pregnant  Start with IV fluid, discussed the risk/benefit from the medication, patient understood and agreed.  No need for oxygen therapy, her saturation more than 94% on room air    Tachycardia  Assessment & Plan  More than 120, sinus rhythm  Likely related to dehydration and infection  Lactic acid is normal  IV fluid telemetry and close monitoring   no need for beta-blockers at this time    Sepsis (HCC)- (present on admission)  Assessment & Plan  This is Sepsis Present on admission  SIRS criteria identified on my evaluation include: Fever, with temperature greater than 101 deg F and  Tachycardia, with heart rate greater than 90 BPM  Source is flu infection  Sepsis protocol initiated  Fluid resuscitation ordered per protocol  Crystalloid Fluid Administration: Fluid resuscitation ordered per standard protocol - 30 mL/kg per current or ideal body weight  IV antibiotics as appropriate for source of sepsis  Reassessment: I have reassessed the patient's hemodynamic status  IV fluid  Blood culture  Tamiflu and no need for antibiotics          20 weeks gestation of pregnancy  Assessment & Plan  This is the third pregnancy  No history of diabetes or hypertension  According to her OB, her pregnancy is a stable  Follow-up with with OB as outpatient      VTE prophylaxis: SCDs/TEDs and enoxaparin ppx

## 2022-10-17 NOTE — ASSESSMENT & PLAN NOTE
With fever chills and coughing  No crackles and no signs of pneumonia, no need for chest x-ray since patient is pregnant  Start with IV fluid, discussed the risk/benefit from the medication, patient understood and agreed.  No need for oxygen therapy, her saturation more than 94% on room air

## 2022-10-17 NOTE — ED NOTES
Tylenol order received from the MD but not released from pharmacy at this time message requesting the release has been sent.  Over ride also attempted and denied.  Temp of 102.6F reported to MD.

## 2022-10-17 NOTE — ED NOTES
IVF started and are now running as ordered.  PT with VSS a/o x 4 at this time.  PT also medicated as orderd.

## 2022-10-17 NOTE — PROGRESS NOTES
Patient resting comfortably in bed. Ice packs in place.  No needs verbalized at this time.  Call bell and belongings within reach.

## 2022-10-17 NOTE — ED PROVIDER NOTES
"ED Provider Note    Scribed for Peter Burden M.D. by Mazin Maier. 10/16/2022, 5:32 PM.    Primary care provider: Pcp Pt States None  Means of arrival: Walk-In  History obtained from: Patient  History limited by: None     CHIEF COMPLAINT  Chief Complaint   Patient presents with    Flu Like Symptoms     Cough/cold sx since yesterday evening, + headache. \"Burning\" chest pain constant, exacerbated by coughing, + headache. No N/V. Family member at home recently ill with similar symptoms - tested COVID neg.    UTI     Burning with urination, frequency       HPI  Nataliia Falk is a 27 y.o. female who presents to the Emergency Department for worsening burning right sided chest pain onset last evening. Patient states she is 25 weeks pregnant and she is able to feel the baby kicking. She reports having no history of lung disease or lung problems. Patient states she has not had her COVID or flu vaccination. She reports her chest pain is described as a burning sensation which does not radiate. Patient reports associated decreased appetite, cough, dysuria, fever, and sore throat. Patient states she does feel a burning sensation when she coughs. She reports attempting to alleviate symptoms with 2 Tylenol cold and flu pills 3 hours ago. Patient states pain is exacerbated by coughing. She denies associated nausea, vomiting, diarrhea, or vaginal bleeding. Patient denies any recent travel or any sick contacts.     REVIEW OF SYSTEMS  Pertinent positives include burning right sided chest pain,  decreased appetite, cough, dysuria, fever, and sore throat. Pertinent negatives include nausea, vomiting, diarrhea, or vaginal bleeding. All other systems negative.    PAST MEDICAL HISTORY   has a past medical history of Anemia (10/16/2012), Decreased platelet count (HCC) (6/20/2012), and Pregnancy.    SURGICAL HISTORY   has a past surgical history that includes dilation and curettage (N/A, 9/9/2021) and molly by laparoscopy " "(9/11/2021).    SOCIAL HISTORY  Social History     Tobacco Use    Smoking status: Never    Smokeless tobacco: Never   Vaping Use    Vaping Use: Never used   Substance Use Topics    Alcohol use: No    Drug use: Not Currently      Social History     Substance and Sexual Activity   Drug Use Not Currently       FAMILY HISTORY  Family History   Problem Relation Age of Onset    Cancer Paternal Grandmother         breast cancer     Cancer Paternal Grandfather        CURRENT MEDICATIONS    Current Outpatient Medications   Medication Instructions    metFORMIN (GLUCOPHAGE) 500 mg, Oral, 2 TIMES DAILY WITH MEALS      ALLERGIES  No Known Allergies    PHYSICAL EXAM  VITAL SIGNS: /76   Pulse (!) 134   Temp 37.1 °C (98.8 °F) (Oral)   Resp 18   Ht 1.448 m (4' 9\")   Wt 77.6 kg (171 lb 1.2 oz)   SpO2 97%   BMI 37.02 kg/m²     Constitutional: Well developed, Well nourished, mild distress.   HENT: Normocephalic, Atraumatic, mask in place.  Eyes: Conjunctiva normal, No discharge.   Cardiovascular: Tachycardic, Normal rhythm, No murmurs, equal pulses.   Pulmonary: Normal breath sounds, No respiratory distress, No wheezing, No rales, No rhonchi.  Chest: Chest pain reproducible with palpation over the sternum.   Abdomen:Soft, No tenderness, No masses, no rebound, no guarding. Gravid, consistent with dates.   Back: No CVA tenderness.   Musculoskeletal: No major deformities noted, No tenderness.   Skin: Warm, Dry, No erythema, No rash.   Neurologic: Alert & oriented x 3, Normal motor function,  No focal deficits noted.   Psychiatric: Affect normal, Judgment normal, Mood normal.     COVID vs flu vs pyelonephritis vs sepsis vs dehydration vs costochondritis vs pneumonia     LABS  Results for orders placed or performed during the hospital encounter of 10/16/22   Lactic acid (lactate)   Result Value Ref Range    Lactic Acid 1.6 0.5 - 2.0 mmol/L   Lactic acid (lactate): Repeat if initial lactic acid result is greater than 2   Result " Value Ref Range    Lactic Acid 1.3 0.5 - 2.0 mmol/L   CBC WITH DIFFERENTIAL   Result Value Ref Range    WBC 8.0 4.8 - 10.8 K/uL    RBC 3.51 (L) 4.20 - 5.40 M/uL    Hemoglobin 11.8 (L) 12.0 - 16.0 g/dL    Hematocrit 34.0 (L) 37.0 - 47.0 %    MCV 96.9 81.4 - 97.8 fL    MCH 33.6 (H) 27.0 - 33.0 pg    MCHC 34.7 33.6 - 35.0 g/dL    RDW 48.5 35.9 - 50.0 fL    Platelet Count 91 (L) 164 - 446 K/uL    MPV 13.4 (H) 9.0 - 12.9 fL    Neutrophils-Polys 79.40 (H) 44.00 - 72.00 %    Lymphocytes 10.70 (L) 22.00 - 41.00 %    Monocytes 8.00 0.00 - 13.40 %    Eosinophils 0.10 0.00 - 6.90 %    Basophils 0.20 0.00 - 1.80 %    Immature Granulocytes 1.60 (H) 0.00 - 0.90 %    Nucleated RBC 0.00 /100 WBC    Neutrophils (Absolute) 6.35 2.00 - 7.15 K/uL    Lymphs (Absolute) 0.86 (L) 1.00 - 4.80 K/uL    Monos (Absolute) 0.64 0.00 - 0.85 K/uL    Eos (Absolute) 0.01 0.00 - 0.51 K/uL    Baso (Absolute) 0.02 0.00 - 0.12 K/uL    Immature Granulocytes (abs) 0.13 (H) 0.00 - 0.11 K/uL    NRBC (Absolute) 0.00 K/uL   COMP METABOLIC PANEL   Result Value Ref Range    Sodium 135 135 - 145 mmol/L    Potassium 3.3 (L) 3.6 - 5.5 mmol/L    Chloride 106 96 - 112 mmol/L    Co2 18 (L) 20 - 33 mmol/L    Anion Gap 11.0 7.0 - 16.0    Glucose 94 65 - 99 mg/dL    Bun 5 (L) 8 - 22 mg/dL    Creatinine 0.43 (L) 0.50 - 1.40 mg/dL    Calcium 8.9 8.5 - 10.5 mg/dL    AST(SGOT) 37 12 - 45 U/L    ALT(SGPT) 28 2 - 50 U/L    Alkaline Phosphatase 79 30 - 99 U/L    Total Bilirubin 0.5 0.1 - 1.5 mg/dL    Albumin 3.9 3.2 - 4.9 g/dL    Total Protein 6.8 6.0 - 8.2 g/dL    Globulin 2.9 1.9 - 3.5 g/dL    A-G Ratio 1.3 g/dL   URINALYSIS    Specimen: Urine   Result Value Ref Range    Color Yellow     Character Clear     Specific Gravity 1.004 <1.035    Ph 6.5 5.0 - 8.0    Glucose Negative Negative mg/dL    Ketones Negative Negative mg/dL    Protein Negative Negative mg/dL    Bilirubin Negative Negative    Urobilinogen, Urine 0.2 Negative    Nitrite Negative Negative    Leukocyte Esterase  Negative Negative    Occult Blood Negative Negative    Micro Urine Req see below    CoV-2, FLU A/B, and RSV by PCR (2-4 Hours CEPHEID) : Collect NP swab in VTM    Specimen: Respirate   Result Value Ref Range    Influenza virus A RNA POSITIVE (A) Negative    Influenza virus B, PCR Negative Negative    RSV, PCR Negative Negative    SARS-CoV-2 by PCR NotDetected     SARS-CoV-2 Source NP Swab    ESTIMATED GFR   Result Value Ref Range    GFR (CKD-EPI) 137 >60 mL/min/1.73 m 2      All labs reviewed by me.    COURSE & MEDICAL DECISION MAKING  Pertinent Labs & Imaging studies reviewed. (See chart for details)    5:32 PM - Patient seen and examined at bedside. Patient will be treated with NS IV 1L, albuterol inhaler 4 puffs, Tylenol 650 mg. The patient will receive IV fluids for tachycardia. Ordered Blood Culture x2, Urine Culture, UA, CMP, CBC w/diff, lactate, CoV-2 Flu A/B RSV, Estimated GFR to evaluate her symptoms. The differential diagnoses include but are not limited to: COVID vs flu vs pyelonephritis vs sepsis vs dehydration vs costochondritis vs pneumonia. Discussed plan of care with patient. I informed them that labs and imaging will be ordered to evaluate symptoms. Patient is understanding and agreeable with plan.      7:54 PM - Patient was reevaluated at bedside. She is positive for the flu and will be treated with Tamiflu 75 mg. Patient treated with LR Bolus. Updated patient on plan.     10:32 PM - Patient was reevaluated at bedside. Updated patient on findings and plan. Patient to be treated with Tylenol 650 mg.   Patient's continues to be tachycardic despite IV fluids and Tylenol.  Discussed with the patient that I am concerned that her heart rate has not normalized despite significant IV fluids as well as Tylenol.  Patient is in agreement with staying in the hospital.    11:30 PM paged hospitalist  Discussed the case with Dr. Olmedo hospitalist is agreed to consult on hospitalization    Medical Decision  Making: Patient presents with chest pain, shortness of breath and tachycardia.  At this point time she is positive for flu.  Given the fact that she is pregnant I have given her dose of Tamiflu.  Initially I thought the patient may be slightly dehydrated and was given IV fluids with sepsis bolus.  Despite this she remains tachycardic.  She had a little fever as well which was treated with Tylenol but she continues to be persistently tachycardic.  As far as her chest pain this can have burning sensation when she coughs and is reproducible palpation over the sternum.  She is not hypoxic I do not think she has a pulmonary embolism at this time.  Chest x-ray is deferred given the fact that her lung exam is clear and that she is pregnant.    DISPOSITION:  Patient will be hospitalized by Dr. Olmedo in guarded condition.       FINAL IMPRESSION  1. Influenza A    2. Chest wall pain    3. Tachycardia, unspecified          Mazin SAWANT (Carol), am scribing for, and in the presence of, Peter Burden M.D.    Electronically signed by: Mazin Maier (Carol), 10/16/2022    Peter SAWANT M.D. personally performed the services described in this documentation, as scribed by Mazin Maier in my presence, and it is both accurate and complete.    The note accurately reflects work and decisions made by me.  Peter Burden M.D.  10/16/2022  11:39 PM

## 2022-10-17 NOTE — ASSESSMENT & PLAN NOTE
This is Sepsis Present on admission  SIRS criteria identified on my evaluation include: Fever, with temperature greater than 101 deg F and Tachycardia, with heart rate greater than 90 BPM  Source is flu infection  Sepsis protocol initiated  Fluid resuscitation ordered per protocol  Crystalloid Fluid Administration: Fluid resuscitation ordered per standard protocol - 30 mL/kg per current or ideal body weight  IV antibiotics as appropriate for source of sepsis  Reassessment: I have reassessed the patient's hemodynamic status  IV fluid  Blood cultures pending  On tamiflu

## 2022-10-17 NOTE — PROGRESS NOTES
[facility] Medicine Daily Progress Note    Date of Service  10/17/2022    Chief Complaint  Nataliia Falk is a 27 y.o. female admitted 10/16/2022 with flu like symptoms and sob    Hospital Course  Patient is a 27 year old female who is 25 weeks pregnant. She presented to [facility] on 10/17 with shortness of breath and fever.  Several days prior to admission, she developed shortness of breath with a dry cough and then developed a fever and chills, generalized weakness and palpitations. In the ER , she was noted to be tachycardic with hypertension, lactic acid was normal, no leukocytosis, flu test was positive and UA did not show any white blood cell.      Interval Problem Update  Seen in ER. She states she is feeling better, mild headache 2/10, Tachycardia is improving hr down to 110. She denies abdominal or chest pain, no bleeding. ROS otherwise negative    I have discussed this patient's plan of care and discharge plan at IDT rounds today with Case Management, Nursing, Nursing leadership, and other members of the IDT team.    Consultants/Specialty  Dr. Philippe OB    Code Status  Full Code    Disposition  Patient is not medically cleared for discharge.   Anticipate discharge to to home with close outpatient follow-up.  I have placed the appropriate orders for post-discharge needs.    Review of Systems  Review of Systems   Constitutional:  Positive for fever. Negative for chills, diaphoresis, malaise/fatigue and weight loss.   HENT: Negative.  Negative for sore throat.    Eyes: Negative.  Negative for blurred vision.   Respiratory:  Positive for cough. Negative for shortness of breath.    Cardiovascular: Negative.  Negative for chest pain, palpitations and leg swelling.   Gastrointestinal: Negative.  Negative for abdominal pain, nausea and vomiting.   Genitourinary: Negative.  Negative for dysuria.   Musculoskeletal: Negative.  Negative for myalgias.   Skin: Negative.  Negative for itching and rash.    Neurological:  Positive for headaches (mild, chronic). Negative for dizziness, focal weakness and weakness.   Endo/Heme/Allergies: Negative.  Does not bruise/bleed easily.   Psychiatric/Behavioral: Negative.  Negative for depression, substance abuse and suicidal ideas.    All other systems reviewed and are negative.     Physical Exam  Temp:  [36.3 °C (97.4 °F)-39.2 °C (102.6 °F)] 37.5 °C (99.5 °F)  Pulse:  [109-134] 122  Resp:  [18-42] 42  BP: ()/(51-76) 108/64  SpO2:  [94 %-98 %] 96 %    Physical Exam  Vitals and nursing note reviewed. Exam conducted with a chaperone present.   Constitutional:       General: She is not in acute distress.     Appearance: Normal appearance. She is not diaphoretic.   HENT:      Head: Normocephalic.      Nose: Nose normal.      Mouth/Throat:      Mouth: Mucous membranes are moist.   Eyes:      Pupils: Pupils are equal, round, and reactive to light.   Cardiovascular:      Rate and Rhythm: Regular rhythm. Tachycardia present.      Pulses: Normal pulses.      Heart sounds: Normal heart sounds.   Pulmonary:      Effort: Pulmonary effort is normal.      Breath sounds: Normal breath sounds.   Abdominal:      General: Bowel sounds are normal. There is no distension.      Palpations: Abdomen is soft.      Tenderness: There is no abdominal tenderness.      Comments: gravid   Musculoskeletal:         General: No swelling or deformity. Normal range of motion.   Skin:     General: Skin is warm and dry.      Capillary Refill: Capillary refill takes less than 2 seconds.   Neurological:      General: No focal deficit present.      Mental Status: She is alert and oriented to person, place, and time.      Cranial Nerves: No cranial nerve deficit.   Psychiatric:         Mood and Affect: Mood normal.         Behavior: Behavior normal.       Fluids    Intake/Output Summary (Last 24 hours) at 10/17/2022 1409  Last data filed at 10/17/2022 1300  Gross per 24 hour   Intake 1125 ml   Output --   Net  1125 ml       Laboratory  Recent Labs     10/16/22  1717 10/17/22  0117   WBC 8.0 7.5   RBC 3.51* 3.46*   HEMOGLOBIN 11.8* 11.5*   HEMATOCRIT 34.0* 33.9*   MCV 96.9 98.0*   MCH 33.6* 33.2*   MCHC 34.7 33.9   RDW 48.5 49.8   PLATELETCT 91* 78*   MPV 13.4* 13.3*     Recent Labs     10/16/22  1717 10/17/22  0117   SODIUM 135 141   POTASSIUM 3.3* 3.2*   CHLORIDE 106 110   CO2 18* 19*   GLUCOSE 94 98   BUN 5* 3*   CREATININE 0.43* 0.41*   CALCIUM 8.9 8.4*     Recent Labs     10/17/22  0117   INR 1.11               Imaging  No orders to display        Assessment/Plan  * Influenza A  Assessment & Plan  With fever chills and coughing  No crackles and no signs of pneumonia, no need for chest x-ray since patient is pregnant  Start with IV fluid, discussed the risk/benefit from the medication, patient understood and agreed.  No need for oxygen therapy, her saturation more than 94% on room air    Hypokalemia  Assessment & Plan  Replacing  following    20 weeks gestation of pregnancy  Assessment & Plan  This is the third pregnancy  No history of diabetes or hypertension  Discussed with OB    Tachycardia  Assessment & Plan  Improving  Continue iv fluids  Likely related to dehydration and infection  Lactic acid is normal  IV fluid telemetry and close monitoring  following    Sepsis (HCC)- (present on admission)  Assessment & Plan  This is Sepsis Present on admission  SIRS criteria identified on my evaluation include: Fever, with temperature greater than 101 deg F and Tachycardia, with heart rate greater than 90 BPM  Source is flu infection  Sepsis protocol initiated  Fluid resuscitation ordered per protocol  Crystalloid Fluid Administration: Fluid resuscitation ordered per standard protocol - 30 mL/kg per current or ideal body weight  IV antibiotics as appropriate for source of sepsis  Reassessment: I have reassessed the patient's hemodynamic status  IV fluid  Blood cultures pending  On tamiflu          Thrombocytopenia  (HCC)  Assessment & Plan  Acute on chronic  Currently down to 78  Had transiently been in the 80's previously   Following  No s/o bleeding  Will recheck in am       VTE prophylaxis: enoxaparin ppx    I have performed a physical exam and reviewed and updated ROS and Plan today (10/17/2022). In review of yesterday's note (10/16/2022), there are no changes except as documented above.

## 2022-10-17 NOTE — PROGRESS NOTES
4 Eyes Skin Assessment Completed by TERELL Thacker and TERELL Bojorquez.    Head WDL  Ears WDL  Nose WDL  Mouth WDL  Neck WDL  Breast/Chest WDL  Shoulder Blades WDL  Spine WDL  (R) Arm/Elbow/Hand WDL  (L) Arm/Elbow/Hand WDL  Abdomen WDL  Groin WDL  Scrotum/Coccyx/Buttocks WDL  (R) Leg WDL  (L) Leg WDL  (R) Heel/Foot/Toe WDL  (L) Heel/Foot/Toe WDL          Devices In Places Tele Box and Pulse Ox      Interventions In Place Pillows and Q2 Turns    Possible Skin Injury No    Pictures Uploaded Into Epic N/A  Wound Consult Placed N/A  RN Wound Prevention Protocol Ordered No

## 2022-10-18 VITALS
WEIGHT: 173.28 LBS | OXYGEN SATURATION: 96 % | DIASTOLIC BLOOD PRESSURE: 64 MMHG | SYSTOLIC BLOOD PRESSURE: 105 MMHG | BODY MASS INDEX: 37.38 KG/M2 | HEIGHT: 57 IN | RESPIRATION RATE: 16 BRPM | HEART RATE: 95 BPM | TEMPERATURE: 98.1 F

## 2022-10-18 LAB
ANION GAP SERPL CALC-SCNC: 9 MMOL/L (ref 7–16)
BACTERIA UR CULT: NORMAL
BASOPHILS # BLD AUTO: 0.3 % (ref 0–1.8)
BASOPHILS # BLD: 0.03 K/UL (ref 0–0.12)
BUN SERPL-MCNC: 5 MG/DL (ref 8–22)
CALCIUM SERPL-MCNC: 8.2 MG/DL (ref 8.5–10.5)
CHLORIDE SERPL-SCNC: 108 MMOL/L (ref 96–112)
CO2 SERPL-SCNC: 22 MMOL/L (ref 20–33)
CREAT SERPL-MCNC: 0.46 MG/DL (ref 0.5–1.4)
EOSINOPHIL # BLD AUTO: 0.06 K/UL (ref 0–0.51)
EOSINOPHIL NFR BLD: 0.7 % (ref 0–6.9)
ERYTHROCYTE [DISTWIDTH] IN BLOOD BY AUTOMATED COUNT: 52.5 FL (ref 35.9–50)
GFR SERPLBLD CREATININE-BSD FMLA CKD-EPI: 135 ML/MIN/1.73 M 2
GLUCOSE SERPL-MCNC: 96 MG/DL (ref 65–99)
HCT VFR BLD AUTO: 32.1 % (ref 37–47)
HGB BLD-MCNC: 10.8 G/DL (ref 12–16)
IMM GRANULOCYTES # BLD AUTO: 0.07 K/UL (ref 0–0.11)
IMM GRANULOCYTES NFR BLD AUTO: 0.8 % (ref 0–0.9)
LACTATE SERPL-SCNC: 0.8 MMOL/L (ref 0.5–2)
LYMPHOCYTES # BLD AUTO: 2.41 K/UL (ref 1–4.8)
LYMPHOCYTES NFR BLD: 26.5 % (ref 22–41)
MCH RBC QN AUTO: 33.6 PG (ref 27–33)
MCHC RBC AUTO-ENTMCNC: 33.6 G/DL (ref 33.6–35)
MCV RBC AUTO: 100 FL (ref 81.4–97.8)
MONOCYTES # BLD AUTO: 0.8 K/UL (ref 0–0.85)
MONOCYTES NFR BLD AUTO: 8.8 % (ref 0–13.4)
NEUTROPHILS # BLD AUTO: 5.74 K/UL (ref 2–7.15)
NEUTROPHILS NFR BLD: 62.9 % (ref 44–72)
NRBC # BLD AUTO: 0 K/UL
NRBC BLD-RTO: 0 /100 WBC
PLATELET # BLD AUTO: 91 K/UL (ref 164–446)
PMV BLD AUTO: 13.5 FL (ref 9–12.9)
POTASSIUM SERPL-SCNC: 3.8 MMOL/L (ref 3.6–5.5)
RBC # BLD AUTO: 3.21 M/UL (ref 4.2–5.4)
SIGNIFICANT IND 70042: NORMAL
SITE SITE: NORMAL
SODIUM SERPL-SCNC: 139 MMOL/L (ref 135–145)
SOURCE SOURCE: NORMAL
WBC # BLD AUTO: 9.1 K/UL (ref 4.8–10.8)

## 2022-10-18 PROCEDURE — 99239 HOSP IP/OBS DSCHRG MGMT >30: CPT | Performed by: INTERNAL MEDICINE

## 2022-10-18 PROCEDURE — 80048 BASIC METABOLIC PNL TOTAL CA: CPT

## 2022-10-18 PROCEDURE — 700105 HCHG RX REV CODE 258: Performed by: HOSPITALIST

## 2022-10-18 PROCEDURE — 85025 COMPLETE CBC W/AUTO DIFF WBC: CPT

## 2022-10-18 PROCEDURE — A9270 NON-COVERED ITEM OR SERVICE: HCPCS | Performed by: INTERNAL MEDICINE

## 2022-10-18 PROCEDURE — 700102 HCHG RX REV CODE 250 W/ 637 OVERRIDE(OP): Performed by: INTERNAL MEDICINE

## 2022-10-18 PROCEDURE — 83605 ASSAY OF LACTIC ACID: CPT

## 2022-10-18 RX ORDER — AMOXICILLIN 500 MG/1
500 CAPSULE ORAL 3 TIMES DAILY
Qty: 15 CAPSULE | Refills: 0 | Status: SHIPPED | OUTPATIENT
Start: 2022-10-18 | End: 2022-10-23

## 2022-10-18 RX ORDER — OSELTAMIVIR PHOSPHATE 75 MG/1
75 CAPSULE ORAL 2 TIMES DAILY
Qty: 10 CAPSULE | Refills: 0 | Status: ON HOLD | OUTPATIENT
Start: 2022-10-18 | End: 2023-01-16

## 2022-10-18 RX ADMIN — OSELTAMIVIR PHOSPHATE 75 MG: 75 CAPSULE ORAL at 05:06

## 2022-10-18 RX ADMIN — SENNOSIDES AND DOCUSATE SODIUM 2 TABLET: 50; 8.6 TABLET ORAL at 05:05

## 2022-10-18 RX ADMIN — SODIUM CHLORIDE: 9 INJECTION, SOLUTION INTRAVENOUS at 07:42

## 2022-10-18 ASSESSMENT — COGNITIVE AND FUNCTIONAL STATUS - GENERAL
SUGGESTED CMS G CODE MODIFIER MOBILITY: CH
DAILY ACTIVITIY SCORE: 24
MOBILITY SCORE: 24
SUGGESTED CMS G CODE MODIFIER DAILY ACTIVITY: CH

## 2022-10-18 ASSESSMENT — PAIN DESCRIPTION - PAIN TYPE: TYPE: ACUTE PAIN

## 2022-10-18 NOTE — PROGRESS NOTES
Pt education performed. IV removed in DC lounge by DC Rns. Paperwork signed. DC to home with family care. All questions asked and answered.    Wheelchair to DC lounge. Mask used.

## 2022-10-18 NOTE — DISCHARGE INSTRUCTIONS
Discharge Instructions per Florencia Montes M.D.    DIAGNOSIS: Influenza A - please continue Tamiflu until your last day on 10/21/22  Right ear infection - Take amoxicillin as prescribed          Discharge Instructions    Discharged to home by car with relative. Discharged via wheelchair, hospital escort: Yes.  Special equipment needed: Not Applicable    Be sure to schedule a follow-up appointment with your primary care doctor or any specialists as instructed.     Discharge Plan:   Diet Plan: Discussed  Activity Level: Discussed  Confirmed Follow up Appointment: Patient to Call and Schedule Appointment  Confirmed Symptoms Management: Discussed  Medication Reconciliation Updated: Yes    I understand that a diet low in cholesterol, fat, and sodium is recommended for good health. Unless I have been given specific instructions below for another diet, I accept this instruction as my diet prescription.   Other diet:     Special Instructions: None    -Is this patient being discharged with medication to prevent blood clots?  No    Is patient discharged on Warfarin / Coumadin?   No

## 2022-10-18 NOTE — PROGRESS NOTES
Pt DC'd. IV removed, discharge instructions provided to patient, pt verbalizes understanding. Pt states all questions have been answered. Copy of discharge paperwork provided to pt, signed copy in chart. Pt states all belongings in possession. Pt ambulated off unit without incident.

## 2022-10-18 NOTE — PROGRESS NOTES
"Pt remain symptom freee, slight tachycardia noted but her HR never lizzy above 110. Pt calls appropriately. L&D nurses were noted this early AM to preform a doppler. According to their report everything with baby is checking out. Pt was using bathroom frequently, new IV placed. Pt voiced that she \"feels a lot better\". Pt compliant with care. Night shift hospitalist updated on rounds, orders placed (see eMAR).    TELE  SR-ST   0.12/0.07/0.35            "

## 2022-10-18 NOTE — CARE PLAN
The patient is Stable - Low risk of patient condition declining or worsening    Shift Goals  Patient Goals: control pain and HR    Progress made toward(s) clinical / shift goals:    Problem: Pain - Standard  Goal: Alleviation of pain or a reduction in pain to the patient’s comfort goal  Outcome: Progressing     Problem: Knowledge Deficit - Standard  Goal: Patient and family/care givers will demonstrate understanding of plan of care, disease process/condition, diagnostic tests and medications  Outcome: Progressing     Problem: Hemodynamics  Goal: Patient's hemodynamics, fluid balance and neurologic status will be stable or improve  Outcome: Progressing     Problem: Fluid Volume  Goal: Fluid volume balance will be maintained  Outcome: Progressing     Problem: Urinary - Renal Perfusion  Goal: Ability to achieve and maintain adequate renal perfusion and functioning will improve  Outcome: Progressing     Problem: Respiratory  Goal: Patient will achieve/maintain optimum respiratory ventilation and gas exchange  Outcome: Progressing     Problem: Mechanical Ventilation  Goal: Safe management of artificial airway and ventilation  Outcome: Progressing  Goal: Successful weaning off mechanical ventilator, spontaneously maintains adequate gas exchange  Outcome: Progressing  Goal: Patient will be able to express needs and understand communication  Outcome: Progressing     Problem: Physical Regulation  Goal: Diagnostic test results will improve  Outcome: Progressing  Goal: Signs and symptoms of infection will decrease  Outcome: Progressing       Patient is not progressing towards the following goals:

## 2022-10-19 NOTE — DISCHARGE SUMMARY
"Discharge Summary    CHIEF COMPLAINT ON ADMISSION  Chief Complaint   Patient presents with    Flu Like Symptoms     Cough/cold sx since yesterday evening, + headache. \"Burning\" chest pain constant, exacerbated by coughing, + headache. No N/V. Family member at home recently ill with similar symptoms - tested COVID neg.    UTI     Burning with urination, frequency       Reason for Admission  Flu-like symptoms      Admission Date  10/16/2022    CODE STATUS  Full    HPI & HOSPITAL COURSE  26 yo woman with 25 week pregnancy who presented with cough, fever, sore throat. She was found with sepsis and influenza A and admitted. She was started on IV fluids and course of tamiflu. She was monitored by L&D nurses, fetal heart sounds were reassuring. She as noted to have thrombocytopenia that fluctuated, lowest was 78. Patient says she has a history of thrombocytopenia with her prior pregnancy and her OB is aware. She has no signs of bleeding or unusual bruising. Her symptoms improved and her sepsis resolved. Blood and urine cultures were negative. She did complain of right ear pain, exam showed injected and opaque tympanic membrane. She will discharge and complete course of tamiflu as well as amoxicillin for right otitis media and follow up with her OB to monitor the thrombocytopenia.    Therefore, she is discharged in good and stable condition to home with close outpatient follow-up.    The patient met 2-midnight criteria for an inpatient stay at the time of discharge.    Discharge Date  10/18/2022    FOLLOW UP ITEMS POST DISCHARGE  Monitor thrombocytopenia for resolution after this acute illness    DISCHARGE DIAGNOSES  Principal Problem:    Influenza A POA: Unknown  Active Problems:    Thrombocytopenia (HCC) POA: Unknown      Overview: 6/20  - 120, 8/24 - 116, 10/2 - 112.    Sepsis (HCC) POA: Yes    Tachycardia POA: Unknown    20 weeks gestation of pregnancy POA: Unknown    Hypokalemia POA: Unknown  Resolved Problems:    * No " resolved hospital problems. *      FOLLOW UP  [facility], Emergency Dept  [address]  [place] [address] [phone]  Follow up  As needed    Primary Physician    Follow up  As needed    Pcp Pt States None            MEDICATIONS ON DISCHARGE     Medication List        START taking these medications        Instructions   amoxicillin 500 MG Caps  Commonly known as: AMOXIL   Take 1 Capsule by mouth 3 times a day for 5 days.  Dose: 500 mg     oseltamivir 75 MG Caps  Commonly known as: TAMIFLU   Take 1 Capsule by mouth 2 times a day.  Dose: 75 mg            CONTINUE taking these medications        Instructions   PRENATAL PO   Take 1 Tablet by mouth every day.  Dose: 1 Tablet     TYLENOL COLD/FLU DAY PO   Take 1 Tablet by mouth every 4 hours.  Dose: 1 Tablet              Allergies  No Known Allergies    DIET  No orders of the defined types were placed in this encounter.      ACTIVITY  As tolerated.      CONSULTATIONS  none    PROCEDURES  No orders to display         LABORATORY  Lab Results   Component Value Date    SODIUM 139 10/18/2022    POTASSIUM 3.8 10/18/2022    CHLORIDE 108 10/18/2022    CO2 22 10/18/2022    GLUCOSE 96 10/18/2022    BUN 5 (L) 10/18/2022    CREATININE 0.46 (L) 10/18/2022        Lab Results   Component Value Date    WBC 9.1 10/18/2022    HEMOGLOBIN 10.8 (L) 10/18/2022    HEMATOCRIT 32.1 (L) 10/18/2022    PLATELETCT 91 (L) 10/18/2022        Total time of the discharge process exceeds 32 minutes.

## 2022-10-21 LAB
BACTERIA BLD CULT: NORMAL
BACTERIA BLD CULT: NORMAL
SIGNIFICANT IND 70042: NORMAL
SIGNIFICANT IND 70042: NORMAL
SITE SITE: NORMAL
SITE SITE: NORMAL
SOURCE SOURCE: NORMAL
SOURCE SOURCE: NORMAL

## 2022-10-31 NOTE — DOCUMENTATION QUERY
[facility]                                                                       Query Response Note      PATIENT:               DALI SINGH  ACCT #:                  [id number]  MRN:                     [id number]  :                      1995  ADMIT DATE:       10/16/2022 5:07 PM  DISCH DATE:        10/18/2022 9:47 AM  RESPONDING  PROVIDER #:        [id number]           QUERY TEXT:    Per H&P dated 10/16/2022 the patient was 20 weeks pregnant admitted with sepsis secondary to influenza A and tachycardia likely related to dehydration and infection. Admit SIRS were 1 - 2 of 4 with heart rate 114 - 134 and respirations 18 - 34.  WBC's were 8.0. Patient was treated with IVF bolus, albuterol inhaler and tamiflu. Per DC Summary dated 10/18/2022 sepsis resolved and the patient was discharged home with tamiflu and amoxicillin for R otitis media.    Please clarify the status of sepsis after study:    The patient's Clinical Indicators include:  10/16   SIRS day of admit 1 - 2 of 4 (HR: 114 - 134; RR: 18 - 31); WBC: 8.0  ED: Chest pain, shortness of breath and tachycardia. Influenza A.  H&P: Sepsis; influenza A; 20 wks gestation; tachycardia likely d/t dehydration & infection.    10/17 SIRS 1 - 2 of 4 (HR: 105 - 125; T: 98.2 F - 102.6 F); WBC: 7.5    10/18 DC Summary: Sepsis resolved, discharged w/ tamiflu & amoxicillin for R otitis media.    Treatment: IVF bolus; tamiflu; tylenol; albuterol inhaler; lab testing  Risk Factors: Influenza A; dehydration; pregnancy    Thank You,  Kristi Leyva RN  Clinical    Connect via [employer]  Options provided:   -- Patient with SIRS and influenza A/right otitis media, Sepsis ruled out   -- Patient with SIRS and influenza A/right otitis media, Sepsis confirmed   -- Other explanation, (please specify other explanation)   -- Unable to determine      Query created by: Denilson  Kristi Martinez on 10/31/2022 6:48 AM    RESPONSE TEXT:    Patient with SIRS and influenza A/right otitis media, Sepsis confirmed          Electronically signed by:  EVARISTO LINDA MD 10/31/2022 4:11 PM

## 2023-01-10 ENCOUNTER — HOSPITAL ENCOUNTER (EMERGENCY)
Facility: MEDICAL CENTER | Age: 28
End: 2023-01-10
Attending: SPECIALIST | Admitting: SPECIALIST
Payer: OTHER MISCELLANEOUS

## 2023-01-10 VITALS
BODY MASS INDEX: 39.05 KG/M2 | RESPIRATION RATE: 18 BRPM | DIASTOLIC BLOOD PRESSURE: 55 MMHG | HEIGHT: 57 IN | WEIGHT: 181 LBS | TEMPERATURE: 97.3 F | SYSTOLIC BLOOD PRESSURE: 101 MMHG | HEART RATE: 94 BPM | OXYGEN SATURATION: 96 %

## 2023-01-10 LAB
APPEARANCE UR: CLEAR
COLOR UR AUTO: YELLOW
GLUCOSE UR QL STRIP.AUTO: NEGATIVE MG/DL
KETONES UR QL STRIP.AUTO: NEGATIVE MG/DL
LEUKOCYTE ESTERASE UR QL STRIP.AUTO: NEGATIVE
NITRITE UR QL STRIP.AUTO: NEGATIVE
PH UR STRIP.AUTO: 7 [PH] (ref 5–8)
PROT UR QL STRIP: NEGATIVE MG/DL
RBC UR QL AUTO: NEGATIVE
SP GR UR STRIP.AUTO: 1.01 (ref 1–1.03)

## 2023-01-10 PROCEDURE — 81002 URINALYSIS NONAUTO W/O SCOPE: CPT

## 2023-01-10 PROCEDURE — 59025 FETAL NON-STRESS TEST: CPT

## 2023-01-10 PROCEDURE — 302449 STATCHG TRIAGE ONLY (STATISTIC)

## 2023-01-10 ASSESSMENT — FIBROSIS 4 INDEX: FIB4 SCORE: 2

## 2023-01-11 NOTE — PROGRESS NOTES
EDWIGE: 2023    1645: Pt presents to L&D with c/o on UCs starting this morning approximately 6 minutes apart. Pt denies VB or LOF. Reports + FM. External monitors applied.     : SVE by RN. .     : Dr. Malik Redding notified. Report given to MD, discussed FHT with MD, and MD reviewed tracing. MD ordering to recheck pt and if unchanged, discharge pt.     : SVE by RN. . External monitors removed. Dr. Malik Redding notified.     : Discharge instructions reviewed with pt. All questions answered. Pt verbalized understanding.     : Pt discharged home in stable condition.

## 2023-01-16 ENCOUNTER — HOSPITAL ENCOUNTER (EMERGENCY)
Facility: MEDICAL CENTER | Age: 28
End: 2023-01-16
Attending: SPECIALIST | Admitting: SPECIALIST
Payer: COMMERCIAL

## 2023-01-16 ENCOUNTER — APPOINTMENT (OUTPATIENT)
Dept: RADIOLOGY | Facility: MEDICAL CENTER | Age: 28
End: 2023-01-16
Attending: SPECIALIST
Payer: COMMERCIAL

## 2023-01-16 VITALS
TEMPERATURE: 98.3 F | HEART RATE: 101 BPM | DIASTOLIC BLOOD PRESSURE: 75 MMHG | SYSTOLIC BLOOD PRESSURE: 122 MMHG | RESPIRATION RATE: 16 BRPM

## 2023-01-16 PROCEDURE — 302449 STATCHG TRIAGE ONLY (STATISTIC)

## 2023-01-16 PROCEDURE — 76815 OB US LIMITED FETUS(S): CPT

## 2023-01-16 ASSESSMENT — PAIN DESCRIPTION - PAIN TYPE: TYPE: ACUTE PAIN

## 2023-01-16 NOTE — PROGRESS NOTES
0130- Pt presents to L&D for evaluation of labor. States contractions started yesterday afternoon, progressively worsening. Now reports q 4-5 min. Denies ROM or bleeding, + Fetal movement. Pt. Taken to room 223 for evaluation.     0352- Discharge instructions reviewed and signed, copy given. Pt. Discharged to home in stable condition with . Ambulated from dept gait steady.

## 2023-06-25 ENCOUNTER — HOSPITAL ENCOUNTER (EMERGENCY)
Facility: MEDICAL CENTER | Age: 28
End: 2023-06-25
Attending: EMERGENCY MEDICINE
Payer: COMMERCIAL

## 2023-06-25 ENCOUNTER — APPOINTMENT (OUTPATIENT)
Dept: RADIOLOGY | Facility: MEDICAL CENTER | Age: 28
End: 2023-06-25
Attending: EMERGENCY MEDICINE
Payer: COMMERCIAL

## 2023-06-25 ENCOUNTER — OFFICE VISIT (OUTPATIENT)
Dept: URGENT CARE | Facility: PHYSICIAN GROUP | Age: 28
End: 2023-06-25
Payer: COMMERCIAL

## 2023-06-25 VITALS
HEART RATE: 83 BPM | SYSTOLIC BLOOD PRESSURE: 115 MMHG | WEIGHT: 167.11 LBS | DIASTOLIC BLOOD PRESSURE: 75 MMHG | TEMPERATURE: 97.9 F | RESPIRATION RATE: 16 BRPM | OXYGEN SATURATION: 98 % | BODY MASS INDEX: 36.05 KG/M2 | HEIGHT: 57 IN

## 2023-06-25 VITALS
OXYGEN SATURATION: 98 % | RESPIRATION RATE: 18 BRPM | HEIGHT: 57 IN | SYSTOLIC BLOOD PRESSURE: 118 MMHG | DIASTOLIC BLOOD PRESSURE: 62 MMHG | TEMPERATURE: 99 F | HEART RATE: 99 BPM | BODY MASS INDEX: 35.6 KG/M2 | WEIGHT: 165 LBS

## 2023-06-25 DIAGNOSIS — R22.1 SUBMANDIBULAR SWELLING: Primary | ICD-10-CM

## 2023-06-25 DIAGNOSIS — J02.9 SORE THROAT: ICD-10-CM

## 2023-06-25 DIAGNOSIS — J02.0 STREP PHARYNGITIS: ICD-10-CM

## 2023-06-25 DIAGNOSIS — R22.0 SUBMANDIBULAR SWELLING: Primary | ICD-10-CM

## 2023-06-25 LAB
ANION GAP SERPL CALC-SCNC: 13 MMOL/L (ref 7–16)
BASOPHILS # BLD AUTO: 0.3 % (ref 0–1.8)
BASOPHILS # BLD: 0.03 K/UL (ref 0–0.12)
BUN SERPL-MCNC: 7 MG/DL (ref 8–22)
CALCIUM SERPL-MCNC: 9.4 MG/DL (ref 8.5–10.5)
CHLORIDE SERPL-SCNC: 103 MMOL/L (ref 96–112)
CO2 SERPL-SCNC: 22 MMOL/L (ref 20–33)
CREAT SERPL-MCNC: 0.54 MG/DL (ref 0.5–1.4)
EOSINOPHIL # BLD AUTO: 0.21 K/UL (ref 0–0.51)
EOSINOPHIL NFR BLD: 2.3 % (ref 0–6.9)
ERYTHROCYTE [DISTWIDTH] IN BLOOD BY AUTOMATED COUNT: 47.7 FL (ref 35.9–50)
GFR SERPLBLD CREATININE-BSD FMLA CKD-EPI: 129 ML/MIN/1.73 M 2
GLUCOSE SERPL-MCNC: 95 MG/DL (ref 65–99)
HCT VFR BLD AUTO: 38.7 % (ref 37–47)
HGB BLD-MCNC: 13 G/DL (ref 12–16)
IMM GRANULOCYTES # BLD AUTO: 0.03 K/UL (ref 0–0.11)
IMM GRANULOCYTES NFR BLD AUTO: 0.3 % (ref 0–0.9)
INT CON NEG: NORMAL
INT CON POS: NORMAL
LACTATE SERPL-SCNC: 1.3 MMOL/L (ref 0.5–2)
LYMPHOCYTES # BLD AUTO: 2.46 K/UL (ref 1–4.8)
LYMPHOCYTES NFR BLD: 26.6 % (ref 22–41)
MCH RBC QN AUTO: 29.2 PG (ref 27–33)
MCHC RBC AUTO-ENTMCNC: 33.6 G/DL (ref 32.2–35.5)
MCV RBC AUTO: 87 FL (ref 81.4–97.8)
MONOCYTES # BLD AUTO: 0.57 K/UL (ref 0–0.85)
MONOCYTES NFR BLD AUTO: 6.2 % (ref 0–13.4)
NEUTROPHILS # BLD AUTO: 5.96 K/UL (ref 1.82–7.42)
NEUTROPHILS NFR BLD: 64.3 % (ref 44–72)
NRBC # BLD AUTO: 0 K/UL
NRBC BLD-RTO: 0 /100 WBC (ref 0–0.2)
PLATELET # BLD AUTO: 116 K/UL (ref 164–446)
PMV BLD AUTO: 12.4 FL (ref 9–12.9)
POTASSIUM SERPL-SCNC: 3.7 MMOL/L (ref 3.6–5.5)
RBC # BLD AUTO: 4.45 M/UL (ref 4.2–5.4)
S PYO AG THROAT QL: NORMAL
SODIUM SERPL-SCNC: 138 MMOL/L (ref 135–145)
WBC # BLD AUTO: 9.3 K/UL (ref 4.8–10.8)

## 2023-06-25 PROCEDURE — 83605 ASSAY OF LACTIC ACID: CPT

## 2023-06-25 PROCEDURE — 80048 BASIC METABOLIC PNL TOTAL CA: CPT

## 2023-06-25 PROCEDURE — 3074F SYST BP LT 130 MM HG: CPT | Performed by: PHYSICIAN ASSISTANT

## 2023-06-25 PROCEDURE — 99205 OFFICE O/P NEW HI 60 MIN: CPT | Performed by: PHYSICIAN ASSISTANT

## 2023-06-25 PROCEDURE — 85025 COMPLETE CBC W/AUTO DIFF WBC: CPT

## 2023-06-25 PROCEDURE — 700117 HCHG RX CONTRAST REV CODE 255: Performed by: EMERGENCY MEDICINE

## 2023-06-25 PROCEDURE — 3078F DIAST BP <80 MM HG: CPT | Performed by: PHYSICIAN ASSISTANT

## 2023-06-25 PROCEDURE — 87040 BLOOD CULTURE FOR BACTERIA: CPT

## 2023-06-25 PROCEDURE — 87880 STREP A ASSAY W/OPTIC: CPT | Performed by: PHYSICIAN ASSISTANT

## 2023-06-25 PROCEDURE — 700111 HCHG RX REV CODE 636 W/ 250 OVERRIDE (IP): Performed by: EMERGENCY MEDICINE

## 2023-06-25 PROCEDURE — 99283 EMERGENCY DEPT VISIT LOW MDM: CPT

## 2023-06-25 PROCEDURE — 36415 COLL VENOUS BLD VENIPUNCTURE: CPT

## 2023-06-25 PROCEDURE — 70491 CT SOFT TISSUE NECK W/DYE: CPT

## 2023-06-25 RX ORDER — AMOXICILLIN AND CLAVULANATE POTASSIUM 875; 125 MG/1; MG/1
1 TABLET, FILM COATED ORAL 2 TIMES DAILY
Qty: 20 TABLET | Refills: 0 | Status: ACTIVE | OUTPATIENT
Start: 2023-06-25 | End: 2023-07-05

## 2023-06-25 RX ORDER — AMOXICILLIN AND CLAVULANATE POTASSIUM 875; 125 MG/1; MG/1
1 TABLET, FILM COATED ORAL 2 TIMES DAILY
Qty: 20 TABLET | Refills: 0 | Status: ACTIVE | OUTPATIENT
Start: 2023-06-25 | End: 2023-06-25 | Stop reason: SDUPTHER

## 2023-06-25 RX ORDER — DEXAMETHASONE SODIUM PHOSPHATE 10 MG/ML
10 INJECTION, SOLUTION INTRAMUSCULAR; INTRAVENOUS ONCE
Status: COMPLETED | OUTPATIENT
Start: 2023-06-25 | End: 2023-06-25

## 2023-06-25 RX ADMIN — DEXAMETHASONE SODIUM PHOSPHATE 10 MG: 10 INJECTION, SOLUTION INTRAMUSCULAR; INTRAVENOUS at 19:08

## 2023-06-25 RX ADMIN — IOHEXOL 80 ML: 350 INJECTION, SOLUTION INTRAVENOUS at 21:45

## 2023-06-25 ASSESSMENT — FIBROSIS 4 INDEX
FIB4 SCORE: 2.07
FIB4 SCORE: 2.07

## 2023-06-25 NOTE — PROGRESS NOTES
Subjective:   Nataliia Falk is a 28 y.o. female who presents for Pharyngitis (X 5 days Sore throat,has a large ball in neck area)  This is a very pleasant 28-year-old female who presents with chief complaint of sore throat, tactile fevers, left ear pain, and 4 hours of swelling beneath her chin.  She states she first noted a sore throat on Wednesday.  She actually started a prescription of amoxicillin that she had at home for frequent otitis media and has therefore been on amoxicillin since Friday.  The swelling has developed since then.  She does report pain with swallowing.  She denies difficulty breathing or shortness of breath.  She is speaking in full sentences.  She is able to manage her secretions.  She does report she has had minimal solid intake but is maintaining liquids.  She denies cough, shortness of breath, dizziness.  She denies any dental pain or swelling.      Medications:  This patient does not have an active medication from one of the medication groupers.    Allergies:             Patient has no known allergies.    Surgical History:         Past Surgical History:   Procedure Laterality Date    SHAWANDA BY LAPAROSCOPY  9/11/2021    Procedure: CHOLECYSTECTOMY, LAPAROSCOPIC;  Surgeon: Noman Aguirre M.D.;  Location: [facility];  Service: General    DILATION AND CURETTAGE N/A 9/9/2021    Procedure: DILATION AND CURETTAGE;  Surgeon: Belinda Cyr D.O.;  Location: [facility];  Service: Obstetrics       Past Social Hx:  Nataliia Falk  reports that she has never smoked. She has never used smokeless tobacco. She reports that she does not currently use drugs. She reports that she does not drink alcohol.     Past Family Hx:   Nataliia Falk family history includes Cancer in her paternal grandfather and paternal grandmother.       Problem list, medications, and allergies reviewed by myself today in Epic.     Objective:     /62   Pulse 99   Temp  "37.2 °C (99 °F) (Temporal)   Resp 18   Ht 1.448 m (4' 9\")   Wt 74.8 kg (165 lb)   SpO2 98%   BMI 35.71 kg/m²     Physical Exam  Vitals and nursing note reviewed.   Constitutional:       General: She is not in acute distress.     Appearance: Normal appearance. She is not ill-appearing.   HENT:      Head: Normocephalic.      Comments: There is mild tonsillar erythema with scant exudate noted to the left tonsil.  No significant tonsillar hypertrophy.  Eyes:      Extraocular Movements: Extraocular movements intact.      Pupils: Pupils are equal, round, and reactive to light.   Neck:        Comments: There is a firm palpable approximately 5 x 3 cm submandibular mass.  Significant tenderness palpation.  No overlying erythema.  No obvious cervical lymphadenopathy.  No obvious dental abscess.  Sublingual space questionably slightly full and erythematous, nontender.    She is speaking in full sentences.  She has no respiratory distress.  Cardiovascular:      Rate and Rhythm: Normal rate.   Pulmonary:      Effort: Pulmonary effort is normal.   Skin:     General: Skin is warm.      Findings: No rash.   Neurological:      Mental Status: She is alert and oriented to person, place, and time.   Psychiatric:         Thought Content: Thought content normal.         Judgment: Judgment normal.         Assessment/Plan:     Diagnosis and Associated Orders:     1. Submandibular swelling    2. Sore throat  - POCT Rapid Strep A    3. Strep pharyngitis        Comments/MDM:  This is a pleasant 28-year-old female who presents with submandibular swelling that developed over the last 24 hours.  She has had a sore throat since Wednesday.  She did start amoxicillin on Friday that she had at home for recurrent episodes of otitis media.  She notes the swelling has developed since then.  She denies airway distress currently.  She does report the swelling has worsened today.  She is managing her secretions.  Her strep is positive however her " tonsils are not significantly enlarged.  I am concerned that this swelling has developed in the time frame while she was on antibiotics.  I am recommending higher level of care.  I did discuss her care with colleague who also saw the patient.  I am unable to obtain imaging at this time of day.  She will proceed directly to [facility].  Did offer EMS transport, declined.  Did consider dexamethasone but currently her vitals are stable and she is having no difficulty managing secretions or airway distress.  She understands the necessity to go straight to the emergency room.  Transfer center contacted.  Vital signs currently stable and reassuring.    I personally reviewed prior external notes and test results pertinent to today's visit. Supportive care, natural history, differential diagnoses, and indications for immediate follow-up discussed. Return to clinic or go to ED if symptoms worsen or persist.  Red flag symptoms discussed.  Patient/Parent/Guardian voices understanding. Follow-up with your primary care provider in 3-5 days.  All side effects of medication discussed including allergic response, GI upset, tendon injury, rash, sedation etc    Please note that this dictation was created using voice recognition software. I have made a reasonable attempt to correct obvious errors, but I expect that there are errors of grammar and possibly content that I did not discover before finalizing the note.    This note was electronically signed by Brittney West PA-C

## 2023-06-26 NOTE — ED NOTES
Patient to room from lobby with steady gait.Agree with triage note. Charted for ERP. Call light within reach.

## 2023-06-26 NOTE — ED TRIAGE NOTES
Chief Complaint   Patient presents with    Sore Throat     C/o sore throat and painful swallowing since Thursday. States started taking her left over amoxicillin on Friday but no improvement of symptoms. Sent here from urgent care to r/o submandibular abscess.      States urgent is concerned about her airway d/t swollen mandible. Pt able to manage secretions. Speaking in full sentences. Tested positive for strep done in urgent care.  Vitals:    06/25/23 1746   BP: 122/82   Pulse: (!) 104   Resp: 17   Temp: 36.3 °C (97.4 °F)   SpO2: 99%

## 2023-06-26 NOTE — ED NOTES
PT IS RESTING IN BED. BREATHING IS EVEN AND UNLABORED, SKIN IS WARM AND DRY, VSS, NAD, WILL CONTINUE TO MONITOR. PT FAMILY REMAINS AT BEDSIDE.

## 2023-06-26 NOTE — ED NOTES
PT WANTS PRESCRIPTIONS SENT TO DIFFERENT PHARMACY AND WANTS TO SPEAK TO DOCTOR AGAIN BEFORE DISCHARGE. MD NOTIFIED AND PHARMACY CHANGED IN Epic.

## 2023-06-26 NOTE — ED PROVIDER NOTES
ER Provider Note    Scribed for Dr. Juan Gaspar M.D. by Lavell Harris. 6/25/2023  6:45 PM    Primary Care Provider: Pcp Pt States None    CHIEF COMPLAINT  Chief Complaint   Patient presents with    Sore Throat     C/o sore throat and painful swallowing since Thursday. States started taking her left over amoxicillin on Friday but no improvement of symptoms. Sent here from urgent care to r/o submandibular abscess.        EXTERNAL RECORDS REVIEWED  She had an office visit today for submandibular swelling with a positive strep test. She was previously admitted in October for the Flu.    HPI/ROS    Nataliia Falk is a 28 y.o. female who presents to the ED for sore throat onset 4 days. The patient reports she was having pain in throat Strep 4 days ago, and an ear infection that started 2 days ago and took a previous prescription of amoxicillin. This morning at work she had a nose bleed for 30 min that resolved. She reports of pain when she talks and pain to chin area. She also noticed a lump on her chin work. She was seen at Urgent Care for a positive strep test and fluid in ear, then she was sent here. She adds her daughter at home was sick but she had a negative strep test. She reports she had not eaten today. She has associated symptoms of tactile fevers, cough, difficulty swallowing as saliva feels stuck, and headache, but denies difficulty breathing, rashes, blurry, double vision, nausea, vomiting, changes/blood to stool, dysuria, edema, or bruises. She reports this has never happened before. She reports no other major medical history. She reports she had a recent overnight stay at the hospital during pregnancy where she had the flu.    She reports her ear pain as a 4 that is improving.    PAST MEDICAL HISTORY  Past Medical History:   Diagnosis Date    Anemia 10/16/2012    Decreased platelet count (HCC) 6/20/2012    Pregnancy        SURGICAL HISTORY  Past Surgical History:   Procedure Laterality Date  "   SHAWANDA BY LAPAROSCOPY  9/11/2021    Procedure: CHOLECYSTECTOMY, LAPAROSCOPIC;  Surgeon: Noman Aguirre M.D.;  Location: [facility];  Service: General    DILATION AND CURETTAGE N/A 9/9/2021    Procedure: DILATION AND CURETTAGE;  Surgeon: Belinda Cyr D.O.;  Location: SUR[facility]ERY [facility];  Service: Obstetrics       FAMILY HISTORY  Family History   Problem Relation Age of Onset    Cancer Paternal Grandmother         breast cancer     Cancer Paternal Grandfather        SOCIAL HISTORY   reports that she has never smoked. She has never used smokeless tobacco. She reports that she does not currently use drugs. She reports that she does not drink alcohol.    CURRENT MEDICATIONS  No current outpatient medications    ALLERGIES  Patient has no known allergies.    PHYSICAL EXAM  /82   Pulse (!) 104   Temp 36.3 °C (97.4 °F) (Temporal)   Resp 17   Ht 1.448 m (4' 9\")   Wt 75.8 kg (167 lb 1.7 oz)   SpO2 99%   BMI 36.16 kg/m²     Constitutional: Alert in no apparent distress.  HENT: No signs of trauma, Bilateral external ears normal, Nose normal. Erythematous oropharynx. No crowding posterior oropharynx.  Eyes: Pupils are equal and reactive, Conjunctiva normal, Non-icteric.   Neck: Normal range of motion. Submandibular mass 5 x 3 cm with tenderness.  Fullness to interior of neck  Lymphatic: No lymphadenopathy noted.   Cardiovascular: Regular rate and rhythm, no murmurs.   Thorax & Lungs: Normal breath sounds, No respiratory distress, No wheezing, No chest tenderness.   Abdomen: Bowel sounds normal, Soft, No tenderness, No masses, No pulsatile masses. No peritoneal signs.  Skin: Warm, Dry, No erythema, No rash.   Back: No bony tenderness, No CVA tenderness.   Extremities: Intact distal pulses, No edema, No tenderness, No cyanosis.  Musculoskeletal: Good range of motion in all major joints. No tenderness to palpation or major deformities noted.   Neurologic: Alert , Normal motor function, Normal " "sensory function, No focal deficits noted.   Psychiatric: Affect normal, Judgment normal, Mood normal.       DIAGNOSTIC STUDIES & PROCEDURES    Labs:   Labs Reviewed   CBC WITH DIFFERENTIAL - Abnormal; Notable for the following components:       Result Value    Platelet Count 116 (*)     All other components within normal limits   BASIC METABOLIC PANEL - Abnormal; Notable for the following components:    Bun 7 (*)     All other components within normal limits   LACTIC ACID   ESTIMATED GFR   BLOOD CULTURE    Narrative:     Per Hospital Policy: Only change Specimen Src: to \"Line\" if  specified by physician order.   BLOOD CULTURE    Narrative:     Per Hospital Policy: Only change Specimen Src: to \"Line\" if  specified by physician order.       All labs reviewed by me.    Radiology:   The attending Emergency Physician has independently interpreted the diagnostic imaging associated with this visit and is awaiting the final reading from the radiologist, which will be displayed below.    Preliminary interpretation is a follows: No obvious abscess.  Radiologist interpretation:     CT-SOFT TISSUE NECK WITH   Final Result         1.  No enhancing fluid collection identified to indicate abscess.   2.  Mild bilateral cervical chain adenopathy, consider causes of adenopathy with additional workup as clinically appropriate.   3.  Bilateral maxillary sinusitis          COURSE & MEDICAL DECISION MAKING    ED Observation Status? Yes; I am placing the patient in to an observation status due to a diagnostic uncertainty as well as therapeutic intensity. Patient placed in observation status at 7:06 PM, 6/25/2023.     Observation plan is as follows: serum testing and imaging    Upon Reevaluation, the patient's condition has: Improved; and will be discharged.    Patient discharged from ED Observation status at 10:17 PM, 6/25/2023.    INITIAL ASSESSMENT AND PLAN  Care Narrative:       6:45 PM - Patient seen and evaluated at bedside by " medical student then myself. Discussed plan of care, including testing and imaging. Patient agrees to plan of care. Ordered blood culture x 2, lactic acid, BMP, CBC with diff, and CT-soft tissue neck w/ to evaluate.    7:05 PM - Patient will be treated with Decadron for symptoms.    10:17 PM - I reevaluated the patient at bedside. The patient informs me they feel improved following dexamethasone administration. I discussed the patient's diagnostic study results which show no abscess. I discussed plan for discharge and follow up as outlined below. The patient is stable for discharge at this time and will return for any new or worsening symptoms. Patient verbalizes understanding and support with my plan for discharge.     Patient with sore throat.  She is strep positive.  At this time lactic acid is normal.  Blood cultures been sent.  A CT scan was performed which shows no abscess.  Given the fact she has been on amoxicillin we will change her to Augmentin.  She feels improved after dexamethasone.  We will discharge her home with strict return precautions and follow-up.  We monitored her and there was no airway compromise during our monitoring time.      ADDITIONAL PROBLEM LIST AND DISPOSITION                 DISPOSITION AND DISCUSSIONS    Barriers to care at this time, including but not limited to: Patient does not have established PCP.     Decision tools and prescription drugs considered including, but not limited to:  Augmentin .    The patient will return for new or worsening symptoms and is stable at the time of discharge.    The patient is referred to a primary physician for blood pressure management, diabetic screening, and for all other preventative health concerns.    DISPOSITION:  Patient will be discharged home in stable condition.    FOLLOW UP:  [facility]  [address] [facility] [address]  [phone]  In 2 days      OUTPATIENT MEDICATIONS:  New Prescriptions     AMOXICILLIN-CLAVULANATE (AUGMENTIN) 875-125 MG TAB    Take 1 Tablet by mouth 2 times a day for 10 days.     FINAL IMPRESSION   1. Strep pharyngitis       Lavell SAWANT (Carol), am scribing for, and in the presence of, Juan Gaspar M.D..    Electronically signed by: Lavell Harris (Carol), 6/25/2023    Juan SAWANT M.D. personally performed the services described in this documentation, as scribed by Lavell Harris in my presence, and it is both accurate and complete.    The note accurately reflects work and decisions made by me.  Juan Gaspar M.D.  6/26/2023  5:07 PM

## 2023-06-27 ENCOUNTER — APPOINTMENT (OUTPATIENT)
Dept: RADIOLOGY | Facility: MEDICAL CENTER | Age: 28
End: 2023-06-27
Attending: STUDENT IN AN ORGANIZED HEALTH CARE EDUCATION/TRAINING PROGRAM
Payer: COMMERCIAL

## 2023-06-27 ENCOUNTER — HOSPITAL ENCOUNTER (EMERGENCY)
Facility: MEDICAL CENTER | Age: 28
End: 2023-06-27
Attending: STUDENT IN AN ORGANIZED HEALTH CARE EDUCATION/TRAINING PROGRAM
Payer: COMMERCIAL

## 2023-06-27 VITALS
HEIGHT: 57 IN | HEART RATE: 96 BPM | WEIGHT: 167.55 LBS | BODY MASS INDEX: 36.15 KG/M2 | OXYGEN SATURATION: 98 % | SYSTOLIC BLOOD PRESSURE: 109 MMHG | RESPIRATION RATE: 17 BRPM | TEMPERATURE: 97 F | DIASTOLIC BLOOD PRESSURE: 76 MMHG

## 2023-06-27 DIAGNOSIS — R59.0 ANTERIOR CERVICAL ADENOPATHY: ICD-10-CM

## 2023-06-27 DIAGNOSIS — D69.6 THROMBOCYTOPENIA (HCC): ICD-10-CM

## 2023-06-27 DIAGNOSIS — R22.0 JAW SWELLING: ICD-10-CM

## 2023-06-27 DIAGNOSIS — J02.0 STREP PHARYNGITIS: ICD-10-CM

## 2023-06-27 LAB
ALBUMIN SERPL BCP-MCNC: 4.4 G/DL (ref 3.2–4.9)
ALBUMIN/GLOB SERPL: 1.4 G/DL
ALP SERPL-CCNC: 102 U/L (ref 30–99)
ALT SERPL-CCNC: 52 U/L (ref 2–50)
ANION GAP SERPL CALC-SCNC: 13 MMOL/L (ref 7–16)
AST SERPL-CCNC: 24 U/L (ref 12–45)
BASOPHILS # BLD AUTO: 0.4 % (ref 0–1.8)
BASOPHILS # BLD: 0.05 K/UL (ref 0–0.12)
BILIRUB SERPL-MCNC: 0.3 MG/DL (ref 0.1–1.5)
BUN SERPL-MCNC: 15 MG/DL (ref 8–22)
CALCIUM ALBUM COR SERPL-MCNC: 8.7 MG/DL (ref 8.5–10.5)
CALCIUM SERPL-MCNC: 9 MG/DL (ref 8.5–10.5)
CHLORIDE SERPL-SCNC: 105 MMOL/L (ref 96–112)
CO2 SERPL-SCNC: 22 MMOL/L (ref 20–33)
CREAT SERPL-MCNC: 0.68 MG/DL (ref 0.5–1.4)
EOSINOPHIL # BLD AUTO: 0.1 K/UL (ref 0–0.51)
EOSINOPHIL NFR BLD: 0.8 % (ref 0–6.9)
ERYTHROCYTE [DISTWIDTH] IN BLOOD BY AUTOMATED COUNT: 52 FL (ref 35.9–50)
GFR SERPLBLD CREATININE-BSD FMLA CKD-EPI: 122 ML/MIN/1.73 M 2
GLOBULIN SER CALC-MCNC: 3.1 G/DL (ref 1.9–3.5)
GLUCOSE SERPL-MCNC: 98 MG/DL (ref 65–99)
HCG SERPL QL: NEGATIVE
HCT VFR BLD AUTO: 38.5 % (ref 37–47)
HGB BLD-MCNC: 13.3 G/DL (ref 12–16)
IMM GRANULOCYTES # BLD AUTO: 0.05 K/UL (ref 0–0.11)
IMM GRANULOCYTES NFR BLD AUTO: 0.4 % (ref 0–0.9)
LACTATE SERPL-SCNC: 1.5 MMOL/L (ref 0.5–2)
LYMPHOCYTES # BLD AUTO: 3.6 K/UL (ref 1–4.8)
LYMPHOCYTES NFR BLD: 29 % (ref 22–41)
MCH RBC QN AUTO: 31.1 PG (ref 27–33)
MCHC RBC AUTO-ENTMCNC: 34.5 G/DL (ref 32.2–35.5)
MCV RBC AUTO: 90.2 FL (ref 81.4–97.8)
MONOCYTES # BLD AUTO: 0.95 K/UL (ref 0–0.85)
MONOCYTES NFR BLD AUTO: 7.7 % (ref 0–13.4)
NEUTROPHILS # BLD AUTO: 7.65 K/UL (ref 1.82–7.42)
NEUTROPHILS NFR BLD: 61.7 % (ref 44–72)
NRBC # BLD AUTO: 0 K/UL
NRBC BLD-RTO: 0 /100 WBC (ref 0–0.2)
PLATELET # BLD AUTO: 148 K/UL (ref 164–446)
PMV BLD AUTO: 12.9 FL (ref 9–12.9)
POTASSIUM SERPL-SCNC: 3.3 MMOL/L (ref 3.6–5.5)
PROT SERPL-MCNC: 7.5 G/DL (ref 6–8.2)
RBC # BLD AUTO: 4.27 M/UL (ref 4.2–5.4)
SODIUM SERPL-SCNC: 140 MMOL/L (ref 135–145)
WBC # BLD AUTO: 12.4 K/UL (ref 4.8–10.8)

## 2023-06-27 PROCEDURE — 87040 BLOOD CULTURE FOR BACTERIA: CPT | Mod: 91

## 2023-06-27 PROCEDURE — 83605 ASSAY OF LACTIC ACID: CPT

## 2023-06-27 PROCEDURE — 84703 CHORIONIC GONADOTROPIN ASSAY: CPT

## 2023-06-27 PROCEDURE — 36415 COLL VENOUS BLD VENIPUNCTURE: CPT

## 2023-06-27 PROCEDURE — 96374 THER/PROPH/DIAG INJ IV PUSH: CPT

## 2023-06-27 PROCEDURE — 96375 TX/PRO/DX INJ NEW DRUG ADDON: CPT

## 2023-06-27 PROCEDURE — 85025 COMPLETE CBC W/AUTO DIFF WBC: CPT

## 2023-06-27 PROCEDURE — 70491 CT SOFT TISSUE NECK W/DYE: CPT

## 2023-06-27 PROCEDURE — 700111 HCHG RX REV CODE 636 W/ 250 OVERRIDE (IP): Performed by: STUDENT IN AN ORGANIZED HEALTH CARE EDUCATION/TRAINING PROGRAM

## 2023-06-27 PROCEDURE — 700117 HCHG RX CONTRAST REV CODE 255: Performed by: STUDENT IN AN ORGANIZED HEALTH CARE EDUCATION/TRAINING PROGRAM

## 2023-06-27 PROCEDURE — 80053 COMPREHEN METABOLIC PANEL: CPT

## 2023-06-27 PROCEDURE — 99284 EMERGENCY DEPT VISIT MOD MDM: CPT

## 2023-06-27 RX ORDER — MORPHINE SULFATE 4 MG/ML
4 INJECTION INTRAVENOUS ONCE
Status: COMPLETED | OUTPATIENT
Start: 2023-06-27 | End: 2023-06-27

## 2023-06-27 RX ORDER — CEFTRIAXONE 2 G/1
2000 INJECTION, POWDER, FOR SOLUTION INTRAMUSCULAR; INTRAVENOUS ONCE
Status: COMPLETED | OUTPATIENT
Start: 2023-06-27 | End: 2023-06-27

## 2023-06-27 RX ORDER — KETOROLAC TROMETHAMINE 30 MG/ML
30 INJECTION, SOLUTION INTRAMUSCULAR; INTRAVENOUS ONCE
Status: COMPLETED | OUTPATIENT
Start: 2023-06-27 | End: 2023-06-27

## 2023-06-27 RX ADMIN — IOHEXOL 80 ML: 350 INJECTION, SOLUTION INTRAVENOUS at 19:25

## 2023-06-27 RX ADMIN — MORPHINE SULFATE 4 MG: 4 INJECTION INTRAVENOUS at 18:45

## 2023-06-27 RX ADMIN — KETOROLAC TROMETHAMINE 30 MG: 30 INJECTION, SOLUTION INTRAMUSCULAR; INTRAVENOUS at 20:54

## 2023-06-27 RX ADMIN — CEFTRIAXONE SODIUM 2000 MG: 2 INJECTION, POWDER, FOR SOLUTION INTRAMUSCULAR; INTRAVENOUS at 18:45

## 2023-06-27 ASSESSMENT — FIBROSIS 4 INDEX: FIB4 SCORE: 1.63

## 2023-06-27 ASSESSMENT — PAIN DESCRIPTION - PAIN TYPE
TYPE: ACUTE PAIN

## 2023-06-27 NOTE — ED TRIAGE NOTES
Chief Complaint   Patient presents with    Sore Throat     Began on Sunday, seen here, discharged with rx for amoxicillin, states pain and swelling to tonsils has gotten worse.     Epistaxis     Has had three bloody noses over the last 3 days. No active bleeding at this time.      Pt ambulates to triage for above. Notes was strep positive on Sunday at [facility]    Maintaining airway, no difficulty breathing noted, vss on RA.    Pt to lorenzo, educated on triage process.

## 2023-06-28 NOTE — ED NOTES
Patient discharged per orders. IV removed -bandage applied. Pt verbalized understanding of discharge instructions. Pt leaving in stable condition with all belongings.

## 2023-06-28 NOTE — ED PROVIDER NOTES
ED Provider Note    CHIEF COMPLAINT  Chief Complaint   Patient presents with    Sore Throat     Began on Sunday, seen here, discharged with rx for amoxicillin, states pain and swelling to tonsils has gotten worse.     Epistaxis     Has had three bloody noses over the last 3 days. No active bleeding at this time.        EXTERNAL RECORDS REVIEWED  Pt seen here 6/25/2023 for sore throat and painful swallowing.  Had labs and CT soft tissue neck done at that time.  No submandibular abscess seen, large lymph nodes.  Strep was positive and patient was started on Augmentin.    HPI/ROS  LIMITATION TO HISTORY   Select: : None    Nataliia Falk is a 28 y.o. female who presents with worsening pain and swelling under her jaw.  Patient states symptoms initially began on Sunday, was seen here at that time and diagnosed with strep throat.  CT did not show any evidence of abscess at the time, patient started on outpatient antibiotics which she states she started to take on Monday, but reports pain and swelling has gotten worse since that time.  She reports fever today.  She reports increased difficulty opening her jaw and has felt increasingly short of breath due to the swelling in the area.  She reports 3 episodes of bloody nose over the last 3 days.  Denies significant cough.  Does report pain in her left ear which has been present for the last several days.      PAST MEDICAL HISTORY  Past Medical History:   Diagnosis Date    Anemia 10/16/2012    Decreased platelet count (HCC) 6/20/2012    Pregnancy         SURGICAL HISTORY  Past Surgical History:   Procedure Laterality Date    SHAWANDA BY LAPAROSCOPY  9/11/2021    Procedure: CHOLECYSTECTOMY, LAPAROSCOPIC;  Surgeon: Noman Aguirre M.D.;  Location: SURGERY [facility];  Service: General    DILATION AND CURETTAGE N/A 9/9/2021    Procedure: DILATION AND CURETTAGE;  Surgeon: Belinda Cyr D.O.;  Location: SURGERY [facility];  Service: Obstetrics        FAMILY  "HISTORY  Family History   Problem Relation Age of Onset    Cancer Paternal Grandmother         breast cancer     Cancer Paternal Grandfather        SOCIAL HISTORY       CURRENT MEDICATIONS  Home Medications    Medication Sig Taking? Last Dose Authorizing Provider   amoxicillin-clavulanate (AUGMENTIN) 875-125 MG Tab Take 1 Tablet by mouth 2 times a day for 10 days.   Juan Gaspar M.D.       ALLERGIES  No Known Allergies    PHYSICAL EXAM  /76   Pulse 96   Temp 36.1 °C (97 °F) (Temporal)   Resp 17   Ht 1.448 m (4' 9\")   Wt 76 kg (167 lb 8.8 oz)   SpO2 98%   Constitutional: Alert in no apparent distress.  HENT: No signs of trauma, Bilateral external ears normal, Nose normal.  Patient with slight decreased mouth opening, floor of mouth is soft and nontender.  TMs are normal bilaterally.  No tenderness to mastoids bilaterally.  Eyes: Pupils are equal and reactive, Conjunctiva normal, Non-icteric.   Neck: Swelling and fullness submandibularly, tender, mild tenderness over cervical lymph nodes bilaterally  Cardiovascular: Regular rate and rhythm, no murmurs.   Thorax & Lungs: Normal breath sounds, No respiratory distress, No wheezing  Abdomen: Soft, No tenderness, No peritoneal signs, No masses, No pulsatile masses.   Skin: Warm, Dry, No erythema, No rash.   Extremities: Intact distal pulses, No edema, No tenderness, No cyanosis  Neurologic: Alert , Normal motor function, Normal speech, No focal deficits noted.   Psychiatric: Affect normal, Judgment normal, Mood normal.       DIAGNOSTIC STUDIES / PROCEDURES    LABS & EKG    Results for orders placed or performed during the hospital encounter of 06/27/23   CBC WITH DIFFERENTIAL   Result Value Ref Range    WBC 12.4 (H) 4.8 - 10.8 K/uL    RBC 4.27 4.20 - 5.40 M/uL    Hemoglobin 13.3 12.0 - 16.0 g/dL    Hematocrit 38.5 37.0 - 47.0 %    MCV 90.2 81.4 - 97.8 fL    MCH 31.1 27.0 - 33.0 pg    MCHC 34.5 32.2 - 35.5 g/dL    RDW 52.0 (H) 35.9 - 50.0 fL    Platelet " Count 148 (L) 164 - 446 K/uL    MPV 12.9 9.0 - 12.9 fL    Neutrophils-Polys 61.70 44.00 - 72.00 %    Lymphocytes 29.00 22.00 - 41.00 %    Monocytes 7.70 0.00 - 13.40 %    Eosinophils 0.80 0.00 - 6.90 %    Basophils 0.40 0.00 - 1.80 %    Immature Granulocytes 0.40 0.00 - 0.90 %    Nucleated RBC 0.00 0.00 - 0.20 /100 WBC    Neutrophils (Absolute) 7.65 (H) 1.82 - 7.42 K/uL    Lymphs (Absolute) 3.60 1.00 - 4.80 K/uL    Monos (Absolute) 0.95 (H) 0.00 - 0.85 K/uL    Eos (Absolute) 0.10 0.00 - 0.51 K/uL    Baso (Absolute) 0.05 0.00 - 0.12 K/uL    Immature Granulocytes (abs) 0.05 0.00 - 0.11 K/uL    NRBC (Absolute) 0.00 K/uL   COMP METABOLIC PANEL   Result Value Ref Range    Sodium 140 135 - 145 mmol/L    Potassium 3.3 (L) 3.6 - 5.5 mmol/L    Chloride 105 96 - 112 mmol/L    Co2 22 20 - 33 mmol/L    Anion Gap 13.0 7.0 - 16.0    Glucose 98 65 - 99 mg/dL    Bun 15 8 - 22 mg/dL    Creatinine 0.68 0.50 - 1.40 mg/dL    Calcium 9.0 8.5 - 10.5 mg/dL    AST(SGOT) 24 12 - 45 U/L    ALT(SGPT) 52 (H) 2 - 50 U/L    Alkaline Phosphatase 102 (H) 30 - 99 U/L    Total Bilirubin 0.3 0.1 - 1.5 mg/dL    Albumin 4.4 3.2 - 4.9 g/dL    Total Protein 7.5 6.0 - 8.2 g/dL    Globulin 3.1 1.9 - 3.5 g/dL    A-G Ratio 1.4 g/dL   LACTIC ACID   Result Value Ref Range    Lactic Acid 1.5 0.5 - 2.0 mmol/L   BETA-HCG QUALITATIVE SERUM   Result Value Ref Range    Beta-Hcg Qualitative Serum Negative Negative   CORRECTED CALCIUM   Result Value Ref Range    Correct Calcium 8.7 8.5 - 10.5 mg/dL   ESTIMATED GFR   Result Value Ref Range    GFR (CKD-EPI) 122 >60 mL/min/1.73 m 2       RADIOLOGY  I have independently interpreted the diagnostic imaging associated with this visit and am waiting the final reading from the radiologist.   My preliminary interpretation is a follows: CT soft tissue neck-- Slight prominence of area under jaw, no obvious fluid collection, no narrowing of the airway    Radiologist interpretation:   CT-SOFT TISSUE NECK WITH   Final Result          1.  No enhancing fluid collection identified to indicate abscess.   2.  Mild bilateral cervical chain adenopathy, consider causes of adenopathy with additional workup as clinically appropriate.   3.  Bilateral maxillary sinusitis          COURSE & MEDICAL DECISION MAKING    ED Observation Status? Yes; I am placing the patient in to an observation status due to a diagnostic uncertainty as well as therapeutic intensity. Patient placed in observation status at 6:01 PM, 6/27/2023.     Observation plan is as follows: Labs, CT soft tissue neck    Upon Reevaluation, the patient's condition has: Improved; and will be discharged.    Patient discharged from ED Observation status at 8:20 PM (Time) 06/27/23   (Date).     INITIAL ASSESSMENT, COURSE AND PLAN  Care Narrative: 28-year-old female recently diagnosed with strep throat and started on antibiotics presenting with concern for increasing pain and swelling under her jaw.  Patient on exam has tenderness and induration under her jaw but the floor of the mouth is soft and nontender, decent mouth opening.  Vital signs are normal, not septic.  Will obtain basic labs as well as CT for evaluation of possible developing abscess or deeper soft tissue neck infection.  No evidence of peritonsillar abscess on exam.  CT will evaluate for peritonsillar abscess, retropharyngeal abscess, evidence of abscess under the jaw.  Will give dose of ceftriaxone.  No evidence of otitis media or mastoiditis on exam.    8:20 PM  Labs with mild leukocytosis, normal LFTs, mild hypokalemia otherwise normal electrolytes and renal function.  Lactic acid is normal, patient not pregnant.  Thrombocytopenia present but improved over previous labs.  CT of the neck shows no evidence of abscess, underneath of mouth is still prominent but not larger than prior CT.  Given the patient has received a dose of ceftriaxone here, will discharge given imaging findings are not worsening to continue Augmentin  outpatient.    ADDITIONAL PROBLEM LIST    Swelling under jaw  Strep pharyngitis    DISPOSITION AND DISCUSSIONS    Escalation of care considered, and ultimately not performed:acute inpatient care management, however at this time, the patient is most appropriate for outpatient management    Barriers to care at this time, including but not limited to: Patient does not have established PCP.     Decision tools and prescription drugs considered including, but not limited to: Antibiotics ceftriaxone given here, already on appropriate antibiotics outpatient, continue these .    Discharged home in stable condition    FINAL DIAGNOSIS  1. Strep pharyngitis Acute Referral to establish with [facility] PCP      2. Jaw swelling Acute Referral to establish with [facility] PCP      3. Anterior cervical adenopathy Acute Referral to establish with [facility] PCP      4. Thrombocytopenia (HCC) Chronic             Electronically signed by: Lesia Camacho M.D., 06/27/23 5:41 PM

## 2023-06-28 NOTE — ED NOTES
Assumed care of patient, bedside report received from TERELL Retana.  Introduced self as pt RN, POC discussed, call light in reach, pt on room air at this time.

## 2023-06-28 NOTE — ED NOTES
Assist RN: Patient given medication as per MAR, education given, patient verbalize understanding.

## 2023-06-28 NOTE — DISCHARGE INSTRUCTIONS
As we discussed at least on the imaging done here it does not look like the swelling is worsening.  No abscess is developing that we see.  Continue the antibiotics you were prescribed at your prior visit.  Use the medications below to help with pain and inflammation.    Take the following medications for pain/fever at home:  Acetaminophen (Tylenol): Take 650 mg (2 regular strength) every 6 hours. Do not take more than 3,000mg in a 24 hour period.   Ibuprofen: Take 400-600 mg (2-3 regular strength) every 6 hours. Take with food.   Alternate the two medications and you can take one of them every 3 hours.     Return to the emergency department if symptoms worsen.  We strongly recommend you find a primary care doctor.

## 2023-06-28 NOTE — ED NOTES
Pt walked to [address] with out difficulty. Pt in sitting up right in bed with call light within reach.

## 2023-07-07 ENCOUNTER — TELEPHONE (OUTPATIENT)
Dept: HEALTH INFORMATION MANAGEMENT | Facility: OTHER | Age: 28
End: 2023-07-07

## 2023-07-07 NOTE — PROGRESS NOTES
Assumed care of patient at shift change.  Patient AAO x 4, pleasant, on RA, c/o mild headache...PRN Tylenol given.  Fever 102.6 F prior to Tylenol administration...101.2 F s/p Tylenol...ice packs given. Will follow-up.  No further needs verbalized at this time.  Call bell and belongings within reach.  Will continue to monitor.    Patient's , Juan, called and updated per patient request. All questions answered.   Subjective   Patient ID: Familia Trammell is a 34 y.o. male who presents for Establish Care (Familia is here today to establish care and edema is both legs been more frequent past few weeks. Would like a physical hasn't been to doctors since teenage years).    Pt is here for annual wellness.  Reports overall health is not great    Do you take any herbs or supplements that were not prescribed by a doctor? no  Are you taking calcium supplements? no  Are you taking aspirin daily? no  Colonoscopy: n/a  Fasting blood work: 7/7/23  Last eye exam: never  Last dental Exam: never  Exercise:none  Mood:pleasant  Sleep: broken  Diet:  shitty  Occupation:  [employer], work with [employer], 10hour shifts  Do you have pain that bothers you in your daily life? yes    1. Patient Counseling:  --Nutrition: Stressed importance of moderation in sodium/caffeine intake, saturated fat and cholesterol, caloric balance, sufficient intake of fresh fruits, vegetables, fiber, calcium, iron, and 1 mg of folate supplement per day (for females capable of pregnancy).  --Discussed the issue of estrogen replacement, calcium supplement, and the daily use of baby aspirin.  --Exercise: Stressed the importance of regular exercise.   --Substance Abuse: Discussed cessation/primary prevention of tobacco, alcohol, or other drug use; driving or other dangerous activities under the influence; availability of treatment for abuse.    --Sexuality: Discussed sexually transmitted diseases, partner selection, use of condoms, avoidance of unintended pregnancy  and contraceptive alternatives.   --Injury prevention: Discussed safety belts, safety helmets, smoke detector, smoking near bedding or upholstery.   --Dental health: Discussed importance of regular tooth brushing, flossing, and dental visits.  --Immunizations reviewed.  --Discussed benefits of screening colonoscopy.  --After hours service discussed with patient  2 Discussed the patient's BMI with  "her.  The BMI is above average. The patient received Provided instructions on dietary changes because they have an above normal BMI.  3 Follow up in 4 weeks        Hypertension  This is a chronic problem. The current episode started more than 1 year ago. The problem is unchanged. The problem is uncontrolled. Associated symptoms include malaise/fatigue and peripheral edema. Pertinent negatives include no anxiety, blurred vision, chest pain, headaches, neck pain, orthopnea, palpitations, PND, shortness of breath or sweats. Risk factors for coronary artery disease include male gender and obesity. Past treatments include nothing.        Review of Systems   Constitutional:  Positive for malaise/fatigue. Negative for fatigue and fever.   HENT:  Negative for congestion, ear discharge, ear pain, nosebleeds, postnasal drip, rhinorrhea, sinus pressure, sinus pain, sore throat, tinnitus and voice change.    Eyes:  Negative for blurred vision, pain, discharge and itching.   Respiratory:  Negative for cough, chest tightness, shortness of breath and wheezing.    Cardiovascular:  Negative for chest pain, palpitations, orthopnea, leg swelling and PND.   Gastrointestinal:  Negative for abdominal pain, blood in stool, constipation, diarrhea and vomiting.   Endocrine: Negative for cold intolerance, heat intolerance and polyuria.   Genitourinary:  Negative for difficulty urinating, frequency, hematuria, penile pain, penile swelling, scrotal swelling, testicular pain and urgency.   Musculoskeletal:  Negative for back pain, gait problem, joint swelling, myalgias and neck pain.   Skin:  Negative for rash and wound.   Neurological:  Negative for dizziness, speech difficulty, weakness, numbness and headaches.   Psychiatric/Behavioral:  Negative for agitation, dysphoric mood and sleep disturbance. The patient is not nervous/anxious and is not hyperactive.        Objective   BP (!) 146/102   Pulse (!) 114   Resp 18   Ht 1.905 m (6' 3\")   " Wt (!) 194 kg (428 lb)   SpO2 97%   BMI 53.50 kg/m²     Physical Exam  Constitutional:       Appearance: Normal appearance.   HENT:      Head: Normocephalic and atraumatic.      Right Ear: Tympanic membrane and external ear normal.      Left Ear: Tympanic membrane and external ear normal.      Nose: Nose normal.      Mouth/Throat:      Mouth: Mucous membranes are moist.      Pharynx: Oropharynx is clear. Uvula midline.   Eyes:      General: Lids are normal.      Extraocular Movements: Extraocular movements intact.      Pupils: Pupils are equal, round, and reactive to light.   Neck:      Thyroid: No thyromegaly or thyroid tenderness.   Cardiovascular:      Rate and Rhythm: Normal rate and regular rhythm.      Heart sounds: Normal heart sounds.   Pulmonary:      Effort: Pulmonary effort is normal.      Breath sounds: Normal breath sounds.   Abdominal:      General: Bowel sounds are normal.      Palpations: Abdomen is soft.      Tenderness: There is no abdominal tenderness. There is no guarding.   Musculoskeletal:         General: No swelling. Normal range of motion.      Cervical back: Normal range of motion.      Right lower leg: No edema.      Left lower leg: No edema.   Lymphadenopathy:      Head:      Right side of head: No submental, submandibular or tonsillar adenopathy.      Left side of head: No submental, submandibular or tonsillar adenopathy.      Cervical: No cervical adenopathy.   Skin:     General: Skin is warm and dry.      Capillary Refill: Capillary refill takes less than 2 seconds.      Coloration: Skin is not jaundiced.      Findings: No lesion or rash.   Neurological:      General: No focal deficit present.      Mental Status: He is alert and oriented to person, place, and time.   Psychiatric:         Mood and Affect: Mood normal.         Behavior: Behavior normal.         Thought Content: Thought content normal.         Judgment: Judgment normal.         Assessment/Plan   Problem List Items  Addressed This Visit    None  Visit Diagnoses       Establishing care with new doctor, encounter for    -  Primary    Wellness examination        Relevant Orders    Lipid Panel    Comprehensive Metabolic Panel    CBC    TSH with reflex to Free T4 if abnormal    PSA, Total and Free    Primary hypertension        Relevant Medications    chlorthalidone (Hygroton) 25 mg tablet    Other Relevant Orders    Lipid Panel    Comprehensive Metabolic Panel    CBC    Vitamin D deficiency        Relevant Orders    Vitamin D, Total        Return to clinic in one month for bp check poss sleep study, weight management

## 2023-11-24 ENCOUNTER — HOSPITAL ENCOUNTER (EMERGENCY)
Facility: MEDICAL CENTER | Age: 28
End: 2023-11-25
Attending: STUDENT IN AN ORGANIZED HEALTH CARE EDUCATION/TRAINING PROGRAM
Payer: COMMERCIAL

## 2023-11-24 ENCOUNTER — APPOINTMENT (OUTPATIENT)
Dept: RADIOLOGY | Facility: MEDICAL CENTER | Age: 28
End: 2023-11-24
Attending: STUDENT IN AN ORGANIZED HEALTH CARE EDUCATION/TRAINING PROGRAM
Payer: COMMERCIAL

## 2023-11-24 DIAGNOSIS — M79.10 MYALGIA: ICD-10-CM

## 2023-11-24 DIAGNOSIS — N93.9 VAGINAL BLEEDING: ICD-10-CM

## 2023-11-24 DIAGNOSIS — R10.9 ABDOMINAL CRAMPING: ICD-10-CM

## 2023-11-24 DIAGNOSIS — R50.9 FEVER, UNSPECIFIED FEVER CAUSE: ICD-10-CM

## 2023-11-24 LAB
ALBUMIN SERPL BCP-MCNC: 4.5 G/DL (ref 3.2–4.9)
ALBUMIN/GLOB SERPL: 1.6 G/DL
ALP SERPL-CCNC: 76 U/L (ref 30–99)
ALT SERPL-CCNC: 105 U/L (ref 2–50)
ANION GAP SERPL CALC-SCNC: 11 MMOL/L (ref 7–16)
APPEARANCE UR: CLEAR
AST SERPL-CCNC: 41 U/L (ref 12–45)
BASOPHILS # BLD AUTO: 0.2 % (ref 0–1.8)
BASOPHILS # BLD: 0.01 K/UL (ref 0–0.12)
BILIRUB SERPL-MCNC: 0.7 MG/DL (ref 0.1–1.5)
BILIRUB UR QL STRIP.AUTO: NEGATIVE
BUN SERPL-MCNC: 11 MG/DL (ref 8–22)
CALCIUM ALBUM COR SERPL-MCNC: 8.5 MG/DL (ref 8.5–10.5)
CALCIUM SERPL-MCNC: 8.9 MG/DL (ref 8.5–10.5)
CHLORIDE SERPL-SCNC: 103 MMOL/L (ref 96–112)
CO2 SERPL-SCNC: 24 MMOL/L (ref 20–33)
COLOR UR: YELLOW
CREAT SERPL-MCNC: 0.78 MG/DL (ref 0.5–1.4)
EOSINOPHIL # BLD AUTO: 0.01 K/UL (ref 0–0.51)
EOSINOPHIL NFR BLD: 0.2 % (ref 0–6.9)
ERYTHROCYTE [DISTWIDTH] IN BLOOD BY AUTOMATED COUNT: 44.8 FL (ref 35.9–50)
FLUAV RNA SPEC QL NAA+PROBE: NEGATIVE
FLUBV RNA SPEC QL NAA+PROBE: NEGATIVE
GFR SERPLBLD CREATININE-BSD FMLA CKD-EPI: 106 ML/MIN/1.73 M 2
GLOBULIN SER CALC-MCNC: 2.8 G/DL (ref 1.9–3.5)
GLUCOSE SERPL-MCNC: 103 MG/DL (ref 65–99)
GLUCOSE UR STRIP.AUTO-MCNC: NEGATIVE MG/DL
HCG SERPL QL: NEGATIVE
HCT VFR BLD AUTO: 44 % (ref 37–47)
HGB BLD-MCNC: 15.5 G/DL (ref 12–16)
IMM GRANULOCYTES # BLD AUTO: 0.02 K/UL (ref 0–0.11)
IMM GRANULOCYTES NFR BLD AUTO: 0.3 % (ref 0–0.9)
KETONES UR STRIP.AUTO-MCNC: NEGATIVE MG/DL
LACTATE SERPL-SCNC: 1.4 MMOL/L (ref 0.5–2)
LEUKOCYTE ESTERASE UR QL STRIP.AUTO: NEGATIVE
LIPASE SERPL-CCNC: 33 U/L (ref 11–82)
LYMPHOCYTES # BLD AUTO: 0.84 K/UL (ref 1–4.8)
LYMPHOCYTES NFR BLD: 12.8 % (ref 22–41)
MCH RBC QN AUTO: 33.1 PG (ref 27–33)
MCHC RBC AUTO-ENTMCNC: 35.2 G/DL (ref 32.2–35.5)
MCV RBC AUTO: 94 FL (ref 81.4–97.8)
MICRO URNS: NORMAL
MONOCYTES # BLD AUTO: 0.33 K/UL (ref 0–0.85)
MONOCYTES NFR BLD AUTO: 5 % (ref 0–13.4)
NEUTROPHILS # BLD AUTO: 5.36 K/UL (ref 1.82–7.42)
NEUTROPHILS NFR BLD: 81.5 % (ref 44–72)
NITRITE UR QL STRIP.AUTO: NEGATIVE
NRBC # BLD AUTO: 0 K/UL
NRBC BLD-RTO: 0 /100 WBC (ref 0–0.2)
PH UR STRIP.AUTO: 5 [PH] (ref 5–8)
PLATELET # BLD AUTO: 123 K/UL (ref 164–446)
PMV BLD AUTO: 12.8 FL (ref 9–12.9)
POTASSIUM SERPL-SCNC: 3.9 MMOL/L (ref 3.6–5.5)
PROT SERPL-MCNC: 7.3 G/DL (ref 6–8.2)
PROT UR QL STRIP: NEGATIVE MG/DL
RBC # BLD AUTO: 4.68 M/UL (ref 4.2–5.4)
RBC UR QL AUTO: NEGATIVE
RSV RNA SPEC QL NAA+PROBE: NEGATIVE
SARS-COV-2 RNA RESP QL NAA+PROBE: NOTDETECTED
SODIUM SERPL-SCNC: 138 MMOL/L (ref 135–145)
SP GR UR STRIP.AUTO: 1.02
SPECIMEN SOURCE: NORMAL
UROBILINOGEN UR STRIP.AUTO-MCNC: 0.2 MG/DL
WBC # BLD AUTO: 6.6 K/UL (ref 4.8–10.8)

## 2023-11-24 PROCEDURE — 700111 HCHG RX REV CODE 636 W/ 250 OVERRIDE (IP): Mod: JZ | Performed by: STUDENT IN AN ORGANIZED HEALTH CARE EDUCATION/TRAINING PROGRAM

## 2023-11-24 PROCEDURE — 81003 URINALYSIS AUTO W/O SCOPE: CPT

## 2023-11-24 PROCEDURE — 85025 COMPLETE CBC W/AUTO DIFF WBC: CPT

## 2023-11-24 PROCEDURE — 96374 THER/PROPH/DIAG INJ IV PUSH: CPT

## 2023-11-24 PROCEDURE — 87040 BLOOD CULTURE FOR BACTERIA: CPT | Mod: 91

## 2023-11-24 PROCEDURE — A9270 NON-COVERED ITEM OR SERVICE: HCPCS | Performed by: STUDENT IN AN ORGANIZED HEALTH CARE EDUCATION/TRAINING PROGRAM

## 2023-11-24 PROCEDURE — 36415 COLL VENOUS BLD VENIPUNCTURE: CPT

## 2023-11-24 PROCEDURE — 83605 ASSAY OF LACTIC ACID: CPT

## 2023-11-24 PROCEDURE — 80053 COMPREHEN METABOLIC PANEL: CPT

## 2023-11-24 PROCEDURE — 99285 EMERGENCY DEPT VISIT HI MDM: CPT

## 2023-11-24 PROCEDURE — 700105 HCHG RX REV CODE 258: Performed by: STUDENT IN AN ORGANIZED HEALTH CARE EDUCATION/TRAINING PROGRAM

## 2023-11-24 PROCEDURE — 96375 TX/PRO/DX INJ NEW DRUG ADDON: CPT

## 2023-11-24 PROCEDURE — 84703 CHORIONIC GONADOTROPIN ASSAY: CPT

## 2023-11-24 PROCEDURE — C9803 HOPD COVID-19 SPEC COLLECT: HCPCS | Performed by: STUDENT IN AN ORGANIZED HEALTH CARE EDUCATION/TRAINING PROGRAM

## 2023-11-24 PROCEDURE — 700102 HCHG RX REV CODE 250 W/ 637 OVERRIDE(OP): Performed by: STUDENT IN AN ORGANIZED HEALTH CARE EDUCATION/TRAINING PROGRAM

## 2023-11-24 PROCEDURE — 83690 ASSAY OF LIPASE: CPT

## 2023-11-24 PROCEDURE — 76856 US EXAM PELVIC COMPLETE: CPT

## 2023-11-24 PROCEDURE — 0241U HCHG SARS-COV-2 COVID-19 NFCT DS RESP RNA 4 TRGT MIC: CPT

## 2023-11-24 RX ORDER — ACETAMINOPHEN 500 MG
1000 TABLET ORAL ONCE
Status: COMPLETED | OUTPATIENT
Start: 2023-11-24 | End: 2023-11-24

## 2023-11-24 RX ORDER — ACETAMINOPHEN 500 MG
500-1000 TABLET ORAL EVERY 6 HOURS PRN
Qty: 30 TABLET | Refills: 0 | Status: SHIPPED | OUTPATIENT
Start: 2023-11-24

## 2023-11-24 RX ORDER — KETOROLAC TROMETHAMINE 30 MG/ML
15 INJECTION, SOLUTION INTRAMUSCULAR; INTRAVENOUS ONCE
Status: COMPLETED | OUTPATIENT
Start: 2023-11-24 | End: 2023-11-24

## 2023-11-24 RX ORDER — IBUPROFEN 600 MG/1
600 TABLET ORAL EVERY 6 HOURS PRN
Qty: 30 TABLET | Refills: 0 | Status: SHIPPED | OUTPATIENT
Start: 2023-11-24

## 2023-11-24 RX ORDER — MORPHINE SULFATE 4 MG/ML
4 INJECTION INTRAVENOUS ONCE
Status: COMPLETED | OUTPATIENT
Start: 2023-11-24 | End: 2023-11-24

## 2023-11-24 RX ORDER — SODIUM CHLORIDE, SODIUM LACTATE, POTASSIUM CHLORIDE, CALCIUM CHLORIDE 600; 310; 30; 20 MG/100ML; MG/100ML; MG/100ML; MG/100ML
1000 INJECTION, SOLUTION INTRAVENOUS ONCE
Status: COMPLETED | OUTPATIENT
Start: 2023-11-24 | End: 2023-11-24

## 2023-11-24 RX ADMIN — KETOROLAC TROMETHAMINE 15 MG: 30 INJECTION, SOLUTION INTRAMUSCULAR; INTRAVENOUS at 23:12

## 2023-11-24 RX ADMIN — MORPHINE SULFATE 4 MG: 4 INJECTION, SOLUTION INTRAMUSCULAR; INTRAVENOUS at 23:12

## 2023-11-24 RX ADMIN — ACETAMINOPHEN 1000 MG: 500 TABLET, FILM COATED ORAL at 21:11

## 2023-11-24 RX ADMIN — SODIUM CHLORIDE, POTASSIUM CHLORIDE, SODIUM LACTATE AND CALCIUM CHLORIDE 1000 ML: 600; 310; 30; 20 INJECTION, SOLUTION INTRAVENOUS at 21:12

## 2023-11-24 ASSESSMENT — FIBROSIS 4 INDEX: FIB4 SCORE: 0.61

## 2023-11-25 VITALS
SYSTOLIC BLOOD PRESSURE: 111 MMHG | WEIGHT: 169.97 LBS | HEART RATE: 102 BPM | BODY MASS INDEX: 36.67 KG/M2 | HEIGHT: 57 IN | RESPIRATION RATE: 18 BRPM | OXYGEN SATURATION: 96 % | TEMPERATURE: 99.3 F | DIASTOLIC BLOOD PRESSURE: 59 MMHG

## 2023-11-25 NOTE — ED TRIAGE NOTES
Nataliiaprateek Falk  28 y.o.  Female  Chief Complaint   Patient presents with    Body Aches     Pt reports body aches, abdominal cramps, and lower back pain x 24 hrs.     Vaginal Bleeding     Pt reports she typically has a regular menstrual cycle lasting 5-7 days. Pt reports she had her cycle this month from the 3rd-11th. Pt reports her cycle returned on the 15th and has been heavy since, she reports changing out her tampon every hour or so. +lightheadedness      Pt w oral temp of 102.0 F in triage. Pt denies being around anyone who has been sick recently.     Pt to triage for above compliant. Abdominal protocol ordered.     Pt placed in lobby and educated on triage process. Pt encouraged to alert staff for any changes, pt verbalized understanding.    Pt roomed to [address], wheeled over and connected to monitor by this RN

## 2023-11-25 NOTE — ED PROVIDER NOTES
CHIEF COMPLAINT  Chief Complaint   Patient presents with    Body Aches     Pt reports body aches, abdominal cramps, and lower back pain x 24 hrs.     Vaginal Bleeding     Pt reports she typically has a regular menstrual cycle lasting 5-7 days. Pt reports she had her cycle this month from the 3rd-11th. Pt reports her cycle returned on the 15th and has been heavy since, she reports changing out her tampon every hour or so. +lightheadedness        LIMITATION TO HISTORY   Select: None    HPI    Nataliia Falk is a 28 y.o. female who presents to the Emergency Department evaluation of vaginal bleeding.  Patient states that she had her normal menstrual cycle on the 3rd-11th of this month, and then few days later began having worsening vaginal bleeding, stated that she was having some fairly heavy vaginal bleeding,  going through more than 1 tampon an hour for several hours in a row began to feel lightheaded prompting the visit to the ER. No dysuria increased urinary frequency urgency does admit to bilateral lower abdominal cramping though no specific point tenderness, denies concerns for STDs she is concerned her IUD may be misplaced.  Prompting the visit to the ER.      In addition the patient was complaining of subjective fevers and chills for the past day, with associated cramping abdominal pain.  No specific point abdominal tenderness, some nausea no vomiting no diarrhea, was complaining of achy bilateral low back pain though denies any increased urinary frequency urgency or other complaints    OUTSIDE HISTORIAN(S):  Select: None    EXTERNAL RECORDS REVIEWED  Select: Other none      PAST MEDICAL HISTORY  Past Medical History:   Diagnosis Date    Anemia 10/16/2012    Decreased platelet count (HCC) 6/20/2012    Pregnancy      .    SURGICAL HISTORY  Past Surgical History:   Procedure Laterality Date    SHAWANDA BY LAPAROSCOPY  9/11/2021    Procedure: CHOLECYSTECTOMY, LAPAROSCOPIC;  Surgeon: Noman Aguirre M.D.;   "Location: SURGERY [facility];  Service: General    DILATION AND CURETTAGE N/A 9/9/2021    Procedure: DILATION AND CURETTAGE;  Surgeon: Belinda Cyr D.O.;  Location: SURGERY [facility];  Service: Obstetrics         FAMILY HISTORY  Family History   Problem Relation Age of Onset    Cancer Paternal Grandmother         breast cancer     Cancer Paternal Grandfather           SOCIAL HISTORY  Social History     Socioeconomic History    Marital status:      Spouse name: Not on file    Number of children: Not on file    Years of education: Not on file    Highest education level: Not on file   Occupational History    Not on file   Tobacco Use    Smoking status: Never    Smokeless tobacco: Never   Vaping Use    Vaping Use: Never used   Substance and Sexual Activity    Alcohol use: No    Drug use: Not Currently    Sexual activity: Yes     Partners: Male   Other Topics Concern    Not on file   Social History Narrative    Not on file     Social Determinants of Health     Financial Resource Strain: Not on file   Food Insecurity: Not on file   Transportation Needs: Not on file   Physical Activity: Not on file   Stress: Not on file   Social Connections: Not on file   Intimate Partner Violence: Not on file   Housing Stability: Not on file         CURRENT MEDICATIONS  No current facility-administered medications on file prior to encounter.     No current outpatient medications on file prior to encounter.           ALLERGIES  No Known Allergies    PHYSICAL EXAM  VITAL SIGNS:/71   Pulse (!) 102   Temp (!) 38.8 °C (101.9 °F) (Oral)   Resp 18   Ht 1.448 m (4' 9\")   Wt 77.1 kg (169 lb 15.6 oz)   LMP 11/24/2023   SpO2 95%   BMI 36.78 kg/m²       VITALS - vital signs documented prior to this note have been reviewed and noted,  GENERAL - awake, alert, oriented, GCS 15, no apparent distress, non-toxic  appearing  HEENT - normocephalic, atraumatic, pupils equal, sclera anicteric, mucus  membranes moist no " pharyngeal exudates no erythema  NECK - supple, no meningismus, full active range of motion, trachea midline  CARDIOVASCULAR - regular rate/rhythm, no murmurs/gallops/rubs  PULMONARY - no respiratory distress, speaking in full sentences, clear to  auscultation bilaterally, no wheezing/ronchi/rales, no accessory muscle use  GASTROINTESTINAL -mild left lower and left upper quadrant tenderness on examination the abdomen is otherwise soft, non-tender, non-distended, no rebound, guarding,  or peritonitis  GENITOURINARY -cervical os is closed there is small amount of blood in the vaginal vault no active hemorrhage, IUD string is noted at the cervical os there is no CMT tenderness, no adnexal masses or tenderness  NEUROLOGIC - Awake alert, normal mental status, speech fluid, cognition  normal, moves all extremities  MUSCULOSKELETAL - no obvious asymmetry or deformities present  EXTREMITIES - warm, well-perfused, no cyanosis or significant edema  DERMATOLOGIC - warm, dry, no rashes, no jaundice  PSYCHIATRIC - normal affect, normal insight, normal concentration    DIAGNOSTIC STUDIES / PROCEDURES    LABS  Labs Reviewed   CBC WITH DIFFERENTIAL - Abnormal; Notable for the following components:       Result Value    MCH 33.1 (*)     Platelet Count 123 (*)     Neutrophils-Polys 81.50 (*)     Lymphocytes 12.80 (*)     Lymphs (Absolute) 0.84 (*)     All other components within normal limits   COMP METABOLIC PANEL - Abnormal; Notable for the following components:    Glucose 103 (*)     ALT(SGPT) 105 (*)     All other components within normal limits   LIPASE   HCG QUAL SERUM   URINALYSIS,CULTURE IF INDICATED    Narrative:     Indication for culture:->Patient WITHOUT an indwelling [place]                  catheter in place with new onset of Dysuria, Frequency,                  Urgency, and/or Suprapubic pain   LACTIC ACID   COV-2, FLU A/B, AND RSV BY PCR ([employer])    Narrative:     Release to patient->Immediate   ESTIMATED GFR   BLOOD  "CULTURE    Narrative:     1 of 2 for Blood Culture x 2 sites order. Per Hospital                  Policy: Only change Specimen Src: to \"Line\" if specified by                  physician order.                  Release to patient->Immediate   BLOOD CULTURE    Narrative:     2 of 2 blood culture x2  Sites order. Per Hospital Policy:                  Only change Specimen Src: to \"Line\" if specified by physician                  order.                  Release to patient->Immediate       No leukocytosis, no significantly elevated lactic acid lowering concern for sepsis urinalysis negative for signs of infection  RADIOLOGY  I have independently interpreted the diagnostic imaging associated with this visit and am waiting the final reading from the radiologist.   My preliminary interpretation is as follows: No evidence of tubo-ovarian abscess      Radiologist interpretation:   US-PELVIC TRANSABDOMINAL ONLY   Final Result      1.  Normal transabdominal appearance of the pelvis with satisfactory appearance of the IUD.           COURSE & MEDICAL DECISION MAKING    ED COURSE:    ED Observation Status? Yes;I am placing the patient in to an observation status due to a diagnostic uncertainty as well as therapeutic intensity. Patient placed in observation status at 9:03 PM 11/24/2023    Observation plan is as follows: Serial abdominal exams ultrasound    Reevaluation at 2350 patient states that she is feeling better she has no acute complaints    INTERVENTIONS BY ME:  Medications   acetaminophen (Tylenol) tablet 1,000 mg (1,000 mg Oral Given 11/24/23 2111)   lactated ringers (LR) bolus (0 mL Intravenous Stopped 11/24/23 2200)   morphine 4 MG/ML injection 4 mg (4 mg Intravenous Given 11/24/23 2312)   ketorolac (Toradol) injection 15 mg (15 mg Intravenous Given 11/24/23 2312)       Response on recheck: Improved.      Patient discharged from ED observation at 0001 am 11/25/23  .      @HYDRATION: Based on the patient's presentation of " Dehydration the patient was given IV fluids. IV Hydration was used because oral hydration was not adequate alone. Upon recheck following hydration, the patient was improved.@    INITIAL ASSESSMENT, COURSE AND PLAN  Care Narrative: Evaluation of 10 days of irregular vaginal bleeding, with associated lower abdominal cramping.  As well as subjective fevers and chills for the past 24 hours.  Patient was noted to be febrile and tachycardic and septic workup was initiated and she was bolused with IV fluids.  She has some mild left lower and left upper quadrant tenderness on examination, though otherwise has a reassuring physical exam.   pelvic exam was performed there is no evidence to suggest underlying pelvic inflammatory disease.  Given her fever, with associated left lower quadrant tenderness, was concerned of possible tubo-ovarian abscess thus a pelvic ultrasound was ordered which was unremarkable.  Patient was initially complaining of heavy vaginal bleeding though she was observed in the emergency department for 4 hours with no ongoing vaginal bleeding.  Vital signs improved after defervescent's as well as IV fluids.  At this point I believe the patient may have an underlying viral syndrome given that her fevers just began today, though due to diagnostic uncertainty instructed to return in 12 to 24 hours for an abdominal recheck should her abdominal pain not improve or sooner should she develop any other new or worrisome symptoms.  Regards to her vaginal bleeding will instruct the patient on NSAID use, as well as follow-up with OB gynecology.  She did feel comfortable this plan was discharged in stable condition             ADDITIONAL PROBLEM LIST    DISPOSITION AND DISCUSSIONS    Escalation of care considered, and ultimately not performed:acute inpatient care management, however at this time, the patient is most appropriate for outpatient management    Barriers to care at this time, including but not limited to:  Patient does not have established PCP.     Decision tools and prescription drugs considered including, but not limited to: Antibiotics currently at this point on examination there is no evidence of underlying bacterial infection thus antibiotics were deferred .    FINAL DIAGNOSIS  1. Myalgia Acute   2. Vaginal bleeding Acute   3. Abdominal cramping Acute   4. Fever, unspecified fever cause Acute            Electronically signed by: Antonio Berry DO ,11:59 PM 11/24/23

## 2023-11-25 NOTE — DISCHARGE INSTRUCTIONS
If you are fever persists for greater than 5 days return for recheck if you develop worsening abdominal pain return for recheck if you develop any dizziness lightheadedness continue to have heavy vaginal bleeding please return for recheck.  Take Tylenol and ibuprofen as we discussed for fevers.  Return with any other new or concerning symptoms follow-up with your OB gynecologist

## 2023-11-25 NOTE — ED NOTES
Pt discharged home, discharge and follow up care instructions given, prescriptions sent to the pharmacy of choice, pt verbalized understanding. IV removed, pt ambulated out of ED independently.

## 2023-12-21 ENCOUNTER — OFFICE VISIT (OUTPATIENT)
Dept: URGENT CARE | Facility: CLINIC | Age: 28
End: 2023-12-21
Payer: COMMERCIAL

## 2023-12-21 ENCOUNTER — HOSPITAL ENCOUNTER (EMERGENCY)
Facility: MEDICAL CENTER | Age: 28
End: 2023-12-21
Payer: COMMERCIAL

## 2023-12-21 VITALS
BODY MASS INDEX: 36.68 KG/M2 | TEMPERATURE: 98.3 F | SYSTOLIC BLOOD PRESSURE: 112 MMHG | HEART RATE: 77 BPM | DIASTOLIC BLOOD PRESSURE: 74 MMHG | RESPIRATION RATE: 16 BRPM | OXYGEN SATURATION: 97 % | HEIGHT: 57 IN | WEIGHT: 170 LBS

## 2023-12-21 DIAGNOSIS — R10.30 LOWER ABDOMINAL PAIN: ICD-10-CM

## 2023-12-21 DIAGNOSIS — N92.0 MENORRHAGIA WITH REGULAR CYCLE: ICD-10-CM

## 2023-12-21 LAB
APPEARANCE UR: CLEAR
BILIRUB UR STRIP-MCNC: NORMAL MG/DL
COLOR UR AUTO: YELLOW
GLUCOSE UR STRIP.AUTO-MCNC: NORMAL MG/DL
KETONES UR STRIP.AUTO-MCNC: NORMAL MG/DL
LEUKOCYTE ESTERASE UR QL STRIP.AUTO: NORMAL
NITRITE UR QL STRIP.AUTO: NORMAL
PH UR STRIP.AUTO: 7.5 [PH] (ref 5–8)
POCT INT CON NEG: NEGATIVE
POCT INT CON POS: POSITIVE
POCT URINE PREGNANCY TEST: NEGATIVE
PROT UR QL STRIP: NORMAL MG/DL
RBC UR QL AUTO: NORMAL
SP GR UR STRIP.AUTO: 1.02
UROBILINOGEN UR STRIP-MCNC: 0.2 MG/DL

## 2023-12-21 PROCEDURE — 3078F DIAST BP <80 MM HG: CPT | Performed by: PHYSICIAN ASSISTANT

## 2023-12-21 PROCEDURE — 81002 URINALYSIS NONAUTO W/O SCOPE: CPT | Performed by: PHYSICIAN ASSISTANT

## 2023-12-21 PROCEDURE — 99214 OFFICE O/P EST MOD 30 MIN: CPT | Performed by: PHYSICIAN ASSISTANT

## 2023-12-21 PROCEDURE — 81025 URINE PREGNANCY TEST: CPT | Performed by: PHYSICIAN ASSISTANT

## 2023-12-21 PROCEDURE — 3074F SYST BP LT 130 MM HG: CPT | Performed by: PHYSICIAN ASSISTANT

## 2023-12-21 ASSESSMENT — ENCOUNTER SYMPTOMS
CHILLS: 0
FLANK PAIN: 0
FEVER: 0
ABDOMINAL PAIN: 1
NAUSEA: 0
VOMITING: 0

## 2023-12-21 ASSESSMENT — FIBROSIS 4 INDEX: FIB4 SCORE: 0.88

## 2023-12-22 NOTE — PROGRESS NOTES
Subjective:   Nataliia Falk is a 28 y.o. female who presents today with   Chief Complaint   Patient presents with    Menstrual Problem     Abnormal menstrual cycle for the last 48 hours and abdominal pain       Other  This is a new problem. Episode onset: 2 days. The problem occurs constantly. The problem has been unchanged. Associated symptoms include abdominal pain. Pertinent negatives include no chills, fever, nausea or vomiting. She has tried nothing for the symptoms. The treatment provided no relief.     Patient states her menstrual cycle started 9 days ago and was normal at the start but then over the last 2 days has gotten very heavy bleeding and lower abdominal pain.  Patient describes she is going through 1 tampon every hour for the last 2 days.  Patient states she is not having any lightheadedness or dizziness.    PMH:  has a past medical history of Anemia (10/16/2012), Decreased platelet count (HCC) (6/20/2012), and Pregnancy.    She has no past medical history of Addisons disease ([facility]), Adrenal disorder ([facility]), Allergy, Anxiety, Arrhythmia, Arthritis, Asthma, Blood transfusion, Cancer ([facility]), Cataract, CHF (congestive heart failure) ([facility]), Clotting disorder ([facility]), COPD, Cushings syndrome ([facility]), Depression, Diabetes ([facility]), Diabetic neuropathy ([facility]), GERD (gastroesophageal reflux disease), Glaucoma, Goiter, Head ache, Headache(784.0), Heart attack ([facility]), Heart murmur, HIV (human immunodeficiency virus infection), Hyperlipidemia, Hypertension, IBD (inflammatory bowel disease), Kidney disease, Meningitis, Migraine, Muscle disorder, OSTEOPOROSIS, Parathyroid disorder ([facility]), Pituitary disease ([facility]), Pulmonary emphysema ([facility]), Seizure ([facility]), Sickle cell disease ([facility]), Stroke ([facility]), Substance abuse ([facility]), Thyroid disease, Tuberculosis, Ulcer, or Urinary tract infection, site not specified.  MEDS:   Current Outpatient Medications:     acetaminophen (TYLENOL) 500 MG Tab, Take 1-2 Tablets by mouth every  "6 hours as needed for Moderate Pain. (Patient not taking: Reported on 12/21/2023), Disp: 30 Tablet, Rfl: 0    ibuprofen (MOTRIN) 600 MG Tab, Take 1 Tablet by mouth every 6 hours as needed for Mild Pain or Moderate Pain. (Patient not taking: Reported on 12/21/2023), Disp: 30 Tablet, Rfl: 0  ALLERGIES: No Known Allergies  SURGHX:   Past Surgical History:   Procedure Laterality Date    SHAWANDA BY LAPAROSCOPY  9/11/2021    Procedure: CHOLECYSTECTOMY, LAPAROSCOPIC;  Surgeon: Noman Aguirre M.D.;  Location: SURGERY [facility];  Service: General    DILATION AND CURETTAGE N/A 9/9/2021    Procedure: DILATION AND CURETTAGE;  Surgeon: Belinda Cyr D.O.;  Location: SURGERY [facility];  Service: Obstetrics     SOCHX:  reports that she has never smoked. She has never used smokeless tobacco. She reports that she does not currently use drugs. She reports that she does not drink alcohol.  FH: Reviewed with patient, not pertinent to this visit.       Review of Systems   Constitutional:  Negative for chills and fever.   Gastrointestinal:  Positive for abdominal pain. Negative for nausea and vomiting.   Genitourinary:  Negative for dysuria, flank pain, frequency, hematuria and urgency.        Heavy vaginal bleeding        Objective:   /74   Pulse 77   Temp 36.8 °C (98.3 °F) (Temporal)   Resp 16   Ht 1.448 m (4' 9\")   Wt 77.1 kg (170 lb)   LMP 12/21/2023 (Exact Date)   SpO2 97%   Breastfeeding No   BMI 36.79 kg/m²   Physical Exam  Vitals and nursing note reviewed.   Constitutional:       General: She is not in acute distress.     Appearance: Normal appearance. She is well-developed. She is not ill-appearing or toxic-appearing.   HENT:      Head: Normocephalic and atraumatic.      Right Ear: Hearing normal.      Left Ear: Hearing normal.   Cardiovascular:      Rate and Rhythm: Normal rate and regular rhythm.      Heart sounds: Normal heart sounds.   Pulmonary:      Effort: Pulmonary effort is normal.      " Breath sounds: Normal breath sounds.   Abdominal:      General: There is no distension.      Palpations: Abdomen is soft.      Tenderness: There is abdominal tenderness in the right lower quadrant, suprapubic area and left lower quadrant. There is no right CVA tenderness, left CVA tenderness, guarding or rebound.       Musculoskeletal:      Comments: Normal movement in all 4 extremities   Skin:     General: Skin is warm and dry.   Neurological:      Mental Status: She is alert.      Coordination: Coordination normal.   Psychiatric:         Mood and Affect: Mood normal.       UA negative  HCG negative    Assessment/Plan:   Assessment    1. Menorrhagia with irregular cycle  - POCT Urinalysis  - POCT Pregnancy    2. Lower abdominal pain    Based on patient's symptoms and presentation I recommend ER evaluation at this time.  Called transfer center and provided report.  Patient agreeable to plan.  She elects to go by private vehicle to [facility].      Please note that this dictation was created using voice recognition software. I have made every reasonable attempt to correct obvious errors, but I expect that there are errors of grammar and possibly content that I did not discover before finalizing the note.    Melvin العلي PA-C

## 2024-02-13 ENCOUNTER — HOSPITAL ENCOUNTER (EMERGENCY)
Facility: MEDICAL CENTER | Age: 29
End: 2024-02-13
Attending: EMERGENCY MEDICINE
Payer: COMMERCIAL

## 2024-02-13 ENCOUNTER — APPOINTMENT (OUTPATIENT)
Dept: RADIOLOGY | Facility: MEDICAL CENTER | Age: 29
End: 2024-02-13
Attending: EMERGENCY MEDICINE
Payer: COMMERCIAL

## 2024-02-13 VITALS
RESPIRATION RATE: 16 BRPM | TEMPERATURE: 97.8 F | HEIGHT: 57 IN | SYSTOLIC BLOOD PRESSURE: 105 MMHG | HEART RATE: 82 BPM | OXYGEN SATURATION: 99 % | WEIGHT: 178.79 LBS | DIASTOLIC BLOOD PRESSURE: 52 MMHG | BODY MASS INDEX: 38.57 KG/M2

## 2024-02-13 DIAGNOSIS — S20.211A CONTUSION OF RIGHT CHEST WALL, INITIAL ENCOUNTER: ICD-10-CM

## 2024-02-13 PROCEDURE — A9270 NON-COVERED ITEM OR SERVICE: HCPCS | Performed by: EMERGENCY MEDICINE

## 2024-02-13 PROCEDURE — 71101 X-RAY EXAM UNILAT RIBS/CHEST: CPT | Mod: RT

## 2024-02-13 PROCEDURE — 700102 HCHG RX REV CODE 250 W/ 637 OVERRIDE(OP): Performed by: EMERGENCY MEDICINE

## 2024-02-13 PROCEDURE — 99283 EMERGENCY DEPT VISIT LOW MDM: CPT

## 2024-02-13 RX ORDER — IBUPROFEN 600 MG/1
600 TABLET ORAL ONCE
Status: COMPLETED | OUTPATIENT
Start: 2024-02-13 | End: 2024-02-13

## 2024-02-13 RX ORDER — CYCLOBENZAPRINE HCL 10 MG
10 TABLET ORAL 3 TIMES DAILY PRN
Qty: 15 TABLET | Refills: 0 | Status: SHIPPED | OUTPATIENT
Start: 2024-02-13

## 2024-02-13 RX ORDER — IBUPROFEN 800 MG/1
800 TABLET ORAL EVERY 8 HOURS PRN
Qty: 15 TABLET | Refills: 0 | Status: SHIPPED | OUTPATIENT
Start: 2024-02-13

## 2024-02-13 RX ADMIN — IBUPROFEN 600 MG: 600 TABLET, FILM COATED ORAL at 19:59

## 2024-02-13 ASSESSMENT — PAIN DESCRIPTION - PAIN TYPE: TYPE: ACUTE PAIN

## 2024-02-13 ASSESSMENT — FIBROSIS 4 INDEX: FIB4 SCORE: 0.88

## 2024-02-13 NOTE — Clinical Note
Nataliia Falk was seen and treated in our emergency department on 2/13/2024.  She may return to work on 02/17/2024.       If you have any questions or concerns, please don't hesitate to call.      Nguyễn Childress D.O.

## 2024-02-14 NOTE — ED TRIAGE NOTES
".  Chief Complaint   Patient presents with    Rib Pain     Right sided rib pain since a soccer injury two weeks ago. Reports pain with deep breath. Denies urinary symptoms.        29 yo female ambulatory to triage for above complaint.      Pt is alert and oriented, speaking in full sentences, follows commands and responds appropriately to questions. No respiratory distress.      Patient placed back in lobby and educated on triage process. Asked to inform RN of any changes or concerns.     /67   Pulse 71   Temp 36.5 °C (97.7 °F) (Temporal)   Resp 16   Ht 1.448 m (4' 9\")   Wt 81.1 kg (178 lb 12.7 oz)   LMP 02/04/2024 (Approximate)   SpO2 97%   BMI 38.69 kg/m²     "

## 2024-02-14 NOTE — ED PROVIDER NOTES
ED Provider Note    CHIEF COMPLAINT  Chief Complaint   Patient presents with    Rib Pain     Right sided rib pain since a soccer injury two weeks ago. Reports pain with deep breath. Denies urinary symptoms.        EXTERNAL RECORDS REVIEWED  Outpatient Notes [facility], [facility]'s [facility]    HPI/ROS  LIMITATION TO HISTORY   None  OUTSIDE HISTORIAN(S):  None    Nataliia Falk is a 28 y.o. female who presents here for evaluation of right-sided rib pain for approximately 2 weeks ago.  Patient states that she ran into another player while playing soccer, and sustained a right rib and side injury.  She had no loss of conscious, she has no headache, neck pain, or left-sided injuries.  States she has reproducible pain when she presses on the right side of her mid axilla.  Patient has no shortness of breath.  She has not taken anything prior to arrival for the same.    PAST MEDICAL HISTORY   has a past medical history of Anemia (10/16/2012), Decreased platelet count (HCC) (6/20/2012), and Pregnancy.    SURGICAL HISTORY   has a past surgical history that includes dilation and curettage (N/A, 9/9/2021) and molly by laparoscopy (9/11/2021).    FAMILY HISTORY  Family History   Problem Relation Age of Onset    Cancer Paternal Grandmother         breast cancer     Cancer Paternal Grandfather        SOCIAL HISTORY  Social History     Tobacco Use    Smoking status: Never    Smokeless tobacco: Never   Vaping Use    Vaping Use: Never used   Substance and Sexual Activity    Alcohol use: No    Drug use: Not Currently    Sexual activity: Yes     Partners: Male       CURRENT MEDICATIONS  Home Medications       Reviewed by Anne Purvis R.N. (Registered Nurse) on 02/13/24 at 1812  Med List Status: Partial     Medication Last Dose Status   acetaminophen (TYLENOL) 500 MG Tab  Active   ibuprofen (MOTRIN) 600 MG Tab  Active                    ALLERGIES  No Known Allergies    PHYSICAL EXAM  VITAL SIGNS: /52   Pulse  "82   Temp 36.6 °C (97.8 °F) (Temporal)   Resp 16   Ht 1.448 m (4' 9\")   Wt 81.1 kg (178 lb 12.7 oz)   LMP 02/04/2024 (Approximate)   SpO2 99%   BMI 38.69 kg/m²    Constitutional: Well developed, well nourished. No acute distress.  HEENT: Normocephalic, atraumatic. Posterior pharynx clear and moist.  Eyes:  EOMI. Normal sclera.  Neck: Supple, Full range of motion, nontender.  Chest/Pulmonary: clear to ausculation. Symmetrical expansion.  Right lateral mid axillary tenderness to palpation.  Reproducible, no crepitus  Cardio: Regular rate and rhythm with no murmur.   Abdomen: Soft, nontender. No peritoneal signs. No guarding.   Back: No CVA tenderness, nontender midline, no step offs.  Musculoskeletal: No deformity, no edema, neurovascular intact.   Neuro: Clear speech, appropriate, cooperative, cranial nerves II-XII grossly intact.  Psych: Normal mood and affect      DIAGNOSTIC STUDIES / PROCEDURES  None    RADIOLOGY  I have independently interpreted the diagnostic imaging associated with this visit and am waiting the final reading from the radiologist.   My preliminary interpretation is as follows: See below  Radiologist interpretation:   [id number] (WITH 1-VIEW CXR) RIGHT   Final Result      1.  Unremarkable right rib series.            COURSE & MEDICAL DECISION MAKING    Patient discharged in stable condition    INITIAL ASSESSMENT, COURSE AND PLAN  Care Narrative: This is a 28-year-old female here for evaluation of right lateral rib pain status post colliding with another .  Incident happened 2 weeks ago, patient has intermittent pain with rotation, bending forward and with pressing on the right ribs.  She has no acute finding on x-ray, room air saturation 99%, and heart rate is 82.  I am not suspecting PE at this time.  She is PERC negative.  Patient has no other medical concerns at this time.    DISPOSITION AND DISCUSSIONS  I have discussed management of the patient with the following " physicians and NOE's: None    Discussion of management with other QHP or appropriate source(s): None    Escalation of care considered, and ultimately not performed: None.  IV fluids not indicated    Barriers to care at this time, including but not limited to: Patient does not have established PCP.     Decision tools and prescription drugs considered including, but not limited to: Anti-inflammatories.    FINAL DIAGNOSIS  1. Contusion of right chest wall, initial encounter           Electronically signed by: Nguyễn Childress D.O., 2/13/2024 7:52 PM

## 2024-04-07 ENCOUNTER — TELEPHONE (OUTPATIENT)
Dept: MEDICAL GROUP | Facility: PHYSICIAN GROUP | Age: 29
End: 2024-04-07
Payer: COMMERCIAL

## 2024-04-07 NOTE — LETTER
4/7/2024            Nataliia Falk  [address] [place] [address]              Dear Nataliia,    Your care is very important to us, and we have noticed that on 04/05/2024, you missed your appointment with Esthela Ken D.N.P. at [facility]       We’re committed to providing you with the best care possible. Your appointment time is reserved for you and your provider to discuss any current or new health concerns and, together, determine the best plan of care for you. Please call [phone] to reschedule at your earliest convenience.        In some cases, [facility] offers additional resources to make your healthcare more accessible, including transportation assistance, financial assistance and virtual visits. To learn more about these resources, please call [phone].       In order to keep you as informed as possible, below is a brief summary of our policy regarding missed appointments:        If a patient “No Shows”??three (3) or more appointments within a rolling 12-month           period, they may be dismissed from the practice for failure to follow clinician      recommendations.     If you have any concerns regarding the care you are receiving, please talk with your provider or call the office at [phone] and request to speak with the Practice . We’re committed to providing excellent care, and your feedback is invaluable.          Sincerely,     Esthela Ken D.N.P.

## 2024-06-22 ENCOUNTER — PHARMACY VISIT (OUTPATIENT)
Dept: PHARMACY | Facility: MEDICAL CENTER | Age: 29
End: 2024-06-22
Payer: COMMERCIAL

## 2024-06-22 PROCEDURE — RXMED WILLOW AMBULATORY MEDICATION CHARGE: Performed by: STUDENT IN AN ORGANIZED HEALTH CARE EDUCATION/TRAINING PROGRAM

## 2024-06-22 RX ORDER — AZITHROMYCIN 250 MG/1
TABLET, FILM COATED ORAL
Qty: 4 TABLET | Refills: 0 | Status: SHIPPED | OUTPATIENT
Start: 2024-06-22 | End: 2024-06-23

## 2024-06-22 RX ORDER — NALOXONE HYDROCHLORIDE 4 MG/.1ML
SPRAY NASAL
Qty: 2 EACH | Refills: 0 | OUTPATIENT
Start: 2024-06-22

## 2024-06-22 RX ORDER — METRONIDAZOLE 250 MG/1
250 TABLET ORAL 3 TIMES DAILY
Qty: 21 TABLET | Refills: 0 | Status: SHIPPED | OUTPATIENT
Start: 2024-06-22 | End: 2024-06-23

## 2024-06-22 RX ORDER — TRAMADOL HYDROCHLORIDE 50 MG/1
TABLET ORAL
Qty: 7 TABLET | Refills: 0 | OUTPATIENT
Start: 2024-06-22

## 2024-06-22 RX ORDER — CLINDAMYCIN HYDROCHLORIDE 150 MG/1
150 CAPSULE ORAL EVERY 6 HOURS
Qty: 56 CAPSULE | Refills: 0 | Status: SHIPPED | OUTPATIENT
Start: 2024-06-22 | End: 2024-06-23

## 2024-06-23 ENCOUNTER — HOSPITAL ENCOUNTER (EMERGENCY)
Facility: MEDICAL CENTER | Age: 29
End: 2024-06-23
Attending: EMERGENCY MEDICINE
Payer: COMMERCIAL

## 2024-06-23 ENCOUNTER — PHARMACY VISIT (OUTPATIENT)
Dept: PHARMACY | Facility: MEDICAL CENTER | Age: 29
End: 2024-06-23
Payer: COMMERCIAL

## 2024-06-23 VITALS
BODY MASS INDEX: 39.01 KG/M2 | SYSTOLIC BLOOD PRESSURE: 120 MMHG | TEMPERATURE: 97.7 F | WEIGHT: 185.85 LBS | OXYGEN SATURATION: 97 % | DIASTOLIC BLOOD PRESSURE: 60 MMHG | HEIGHT: 58 IN | RESPIRATION RATE: 16 BRPM | HEART RATE: 82 BPM

## 2024-06-23 DIAGNOSIS — K08.89 PAIN, DENTAL: ICD-10-CM

## 2024-06-23 DIAGNOSIS — K04.7 DENTAL INFECTION: ICD-10-CM

## 2024-06-23 PROCEDURE — 700102 HCHG RX REV CODE 250 W/ 637 OVERRIDE(OP): Performed by: EMERGENCY MEDICINE

## 2024-06-23 PROCEDURE — 99283 EMERGENCY DEPT VISIT LOW MDM: CPT

## 2024-06-23 PROCEDURE — 700111 HCHG RX REV CODE 636 W/ 250 OVERRIDE (IP): Mod: JZ | Performed by: EMERGENCY MEDICINE

## 2024-06-23 PROCEDURE — 64400 NJX AA&/STRD TRIGEMINAL NRV: CPT

## 2024-06-23 PROCEDURE — A9270 NON-COVERED ITEM OR SERVICE: HCPCS | Performed by: EMERGENCY MEDICINE

## 2024-06-23 PROCEDURE — RXMED WILLOW AMBULATORY MEDICATION CHARGE: Performed by: EMERGENCY MEDICINE

## 2024-06-23 RX ORDER — HYDROCODONE BITARTRATE AND ACETAMINOPHEN 5; 325 MG/1; MG/1
1 TABLET ORAL ONCE
Status: COMPLETED | OUTPATIENT
Start: 2024-06-23 | End: 2024-06-23

## 2024-06-23 RX ORDER — CLINDAMYCIN HYDROCHLORIDE 150 MG/1
450 CAPSULE ORAL ONCE
Status: COMPLETED | OUTPATIENT
Start: 2024-06-23 | End: 2024-06-23

## 2024-06-23 RX ORDER — CLINDAMYCIN HYDROCHLORIDE 150 MG/1
450 CAPSULE ORAL 3 TIMES DAILY
Qty: 63 CAPSULE | Refills: 0 | Status: ACTIVE | OUTPATIENT
Start: 2024-06-23 | End: 2024-06-30

## 2024-06-23 RX ADMIN — LIDOCAINE HYDROCHLORIDE 20 ML: 10 INJECTION, SOLUTION EPIDURAL; INFILTRATION; INTRACAUDAL; PERINEURAL at 22:30

## 2024-06-23 RX ADMIN — HYDROCODONE BITARTRATE AND ACETAMINOPHEN 1 TABLET: 5; 325 TABLET ORAL at 22:39

## 2024-06-23 RX ADMIN — CLINDAMYCIN HYDROCHLORIDE 450 MG: 150 CAPSULE ORAL at 22:15

## 2024-06-23 ASSESSMENT — PAIN DESCRIPTION - PAIN TYPE: TYPE: ACUTE PAIN

## 2024-06-23 ASSESSMENT — FIBROSIS 4 INDEX: FIB4 SCORE: 0.91

## 2024-06-24 NOTE — ED PROVIDER NOTES
ER Provider Note    Scribed for Dr. Juan Gaspar M.D. by Mirta Marrero. 6/23/2024  10:08 PM    Primary Care Provider: Pcp Pt States None    CHIEF COMPLAINT  Chief Complaint   Patient presents with    Dental Pain       EXTERNAL RECORDS REVIEWED  Inpatient Notes Patient was seen recently in May for increased BMI.     HPI/ROS    Nataliia Falk is a 29 y.o. female who presents to the ED for dental pain onset 6 days ago. The patient explains she has been having worsening left sided tooth pain. She reports going to her dentist on Wednesday and was prescribed Amoxicillin and was told she needed to get a root canal. The patient states she has not been able to schedule an appointment due to lack of availability despite already having the oral surgeon referral. She adds the pain continued throughout the week so she then visited [facility] where they gave her Clindamycin in IV and was discharged with two different antibiotics. However, despite this she still expresses worsening pain. She reports taking antibiotics and tylenol with no alleviation.     PAST MEDICAL HISTORY  Past Medical History:   Diagnosis Date    Anemia 10/16/2012    Decreased platelet count (HCC) 6/20/2012    Pregnancy        SURGICAL HISTORY  Past Surgical History:   Procedure Laterality Date    SHAWANDA BY LAPAROSCOPY  9/11/2021    Procedure: CHOLECYSTECTOMY, LAPAROSCOPIC;  Surgeon: Noman Aguirre M.D.;  Location: SURGERY [facility];  Service: General    DILATION AND CURETTAGE N/A 9/9/2021    Procedure: DILATION AND CURETTAGE;  Surgeon: Belinda Cyr D.O.;  Location: SURGERY [facility];  Service: Obstetrics       FAMILY HISTORY  Family History   Problem Relation Age of Onset    Cancer Paternal Grandmother         breast cancer     Cancer Paternal Grandfather        SOCIAL HISTORY   reports that she has never smoked. She has never used smokeless tobacco. She reports that she does not currently use drugs. She reports that she does  "not drink alcohol.    CURRENT MEDICATIONS  Discharge Medication List as of 6/23/2024 10:42 PM        CONTINUE these medications which have NOT CHANGED    Details   traMADol (ULTRAM) 50 MG Tab take 1 Tabs Orally every 4 hours  as needed x 7 Days for pain, Disp-7 Tablet, R-0, Normal      Naloxone (NARCAN) 4 MG/0.1ML Liquid apply 1 Sprays Nasally As Directed as needed for Opiate reversal, Instr: Spray one (1) nasal spray in one (1) nostril. Repeat after 3 min if no response., Disp-2 Each, R-0, Normal      !! ibuprofen (MOTRIN) 800 MG Tab Take 1 Tablet by mouth every 8 hours as needed for Mild Pain., Disp-15 Tablet, R-0, Normal      cyclobenzaprine (FLEXERIL) 10 mg Tab Take 1 Tablet by mouth 3 times a day as needed for Mild Pain., Disp-15 Tablet, R-0, Normal      acetaminophen (TYLENOL) 500 MG Tab Take 1-2 Tablets by mouth every 6 hours as needed for Moderate Pain., Disp-30 Tablet, R-0, Normal      !! ibuprofen (MOTRIN) 600 MG Tab Take 1 Tablet by mouth every 6 hours as needed for Mild Pain or Moderate Pain., Disp-30 Tablet, R-0, Normal       !! - Potential duplicate medications found. Please discuss with provider.          Posterioes molar on hte lower left jaw, no swelling of the lips,   ALLERGIES  Patient has no known allergies.    PHYSICAL EXAM  /84   Pulse 88   Temp 36.4 °C (97.6 °F) (Temporal)   Resp 18   Ht 1.473 m (4' 10\")   Wt 84.3 kg (185 lb 13.6 oz)   SpO2 98%   BMI 38.84 kg/m²   Constitutional: Alert in no apparent distress.  HENT: No signs of trauma, Bilateral external ears normal, Nose normal.   posterior molar on the left lower jaw, no swelling of the gums  Eyes: Pupils are equal and reactive, Conjunctiva normal, Non-icteric.   Neck: Normal range of motion, No tenderness, Supple,   Cardiovascular: Regular rate and rhythm, no murmurs.   Thorax & Lungs: Normal breath sounds, No respiratory distress, No wheezing, No chest tenderness.   Abdomen: Bowel sounds normal, Soft, No tenderness,   Skin: " Warm, Dry, No erythema, No rash.   Back: No bony tenderness, No CVA tenderness.   Extremities: No edema, No tenderness, No cyanosis,   Psychiatric: Affect normal     COURSE & MEDICAL DECISION MAKING    ED Observation Status? No; Patient does not meet criteria for ED Observation.     INITIAL ASSESSMENT AND PLAN  Care Narrative:       10:08 PM - Patient seen and evaluated at bedside. Patient will be treated with Xylocaine 1% injection 20 ml, Cleocin capsule 450 mg, Norco 5-325 mg per tablet 1 tablet for her symptoms. Patient verbalizes understanding and agreement to this plan of care.     10:48 PM- The patient reports feeling improved. I will be discharging them with Cleocin. Patient had the opportunity to ask any questions. The plan for discharge was discussed with them and they were told to return for any new or worsening symptoms. She was also informed of the plans for follow up. Patient is understanding and agreeable to the plan for discharge.    ADDITIONAL PROBLEM LIST AND DISPOSITION  Patient with dental infection.  No drainable abscess.  Dental block placed.  Patient with improvement.  Tolerated clindamycin.  Will send home with this medication.  Strict return precautions given.               DISPOSITION AND DISCUSSIONS  I have discussed management of the patient with the following physicians and NOE's: None    Discussion of management with other QHP or appropriate source(s): None     Escalation of care considered, and ultimately not performed: diagnostic imaging.    Barriers to care at this time, including but not limited to: Patient does not have established PCP.     Decision tools and prescription drugs considered including, but not limited to: Antibiotics adjusted for the patient. .    The patient will return for new or worsening symptoms and is stable at the time of discharge.    The patient is referred to a primary physician for blood pressure management, diabetic screening, and for all other preventative  health concerns.    DISPOSITION:  Patient will be discharged home in stable condition.    FOLLOW UP:  [facility]  [address] [facility] [address]  [phone]  In 2 days      OUTPATIENT MEDICATIONS:  Discharge Medication List as of 6/23/2024 10:42 PM        FINAL IMPRESSION   1. Pain, dental    2. Dental infection      Mirta SAWANT (Jaxonibailyn), am scribing for, and in the presence of, uJan Gaspar M.D..    Electronically signed by: Mirta Marrero (Carol), 6/23/2024    Juan SAWANT M.D. personally performed the services described in this documentation, as scribed by Mirta Marrero in my presence, and it is both accurate and complete.    The note accurately reflects work and decisions made by me.  Juan Gaspar M.D.  6/24/2024  2:44 AM

## 2024-06-24 NOTE — ED TRIAGE NOTES
Vitals:    06/23/24 2146   BP: 112/84   Pulse: 88   Resp: 18   Temp: 36.4 °C (97.6 °F)   SpO2: 98%     Chief Complaint   Patient presents with    Dental Pain     Pt started having left sided tooth pain last Monday. She saw her dentist on Wed and was prescribed amoxicillin and referred to an oral surgeon for a root canal but she hasn't been able to get a hold of anyone yet to schedule an appt. Her pain continued so she went to [facility] and was given IV ABX and d/c on two different ABX, but again her pain has continued to worsen.     Pt is ambulatory to and from triage and is alert and oriented x 4.

## 2025-04-10 ENCOUNTER — OFFICE VISIT (OUTPATIENT)
Dept: URGENT CARE | Facility: PHYSICIAN GROUP | Age: 30
End: 2025-04-10
Payer: COMMERCIAL

## 2025-04-10 VITALS
WEIGHT: 190.81 LBS | SYSTOLIC BLOOD PRESSURE: 124 MMHG | HEART RATE: 86 BPM | HEIGHT: 58 IN | RESPIRATION RATE: 20 BRPM | BODY MASS INDEX: 40.05 KG/M2 | TEMPERATURE: 96.8 F | DIASTOLIC BLOOD PRESSURE: 72 MMHG | OXYGEN SATURATION: 98 %

## 2025-04-10 DIAGNOSIS — H60.502 ACUTE OTITIS EXTERNA OF LEFT EAR, UNSPECIFIED TYPE: ICD-10-CM

## 2025-04-10 DIAGNOSIS — H66.90 ACUTE OTITIS MEDIA, UNSPECIFIED OTITIS MEDIA TYPE: ICD-10-CM

## 2025-04-10 PROCEDURE — 3078F DIAST BP <80 MM HG: CPT | Performed by: PHYSICIAN ASSISTANT

## 2025-04-10 PROCEDURE — 99214 OFFICE O/P EST MOD 30 MIN: CPT | Performed by: PHYSICIAN ASSISTANT

## 2025-04-10 PROCEDURE — 3074F SYST BP LT 130 MM HG: CPT | Performed by: PHYSICIAN ASSISTANT

## 2025-04-10 RX ORDER — CIPROFLOXACIN AND DEXAMETHASONE 3; 1 MG/ML; MG/ML
4 SUSPENSION/ DROPS AURICULAR (OTIC) 2 TIMES DAILY
Qty: 7.5 ML | Refills: 0 | Status: SHIPPED | OUTPATIENT
Start: 2025-04-10 | End: 2025-04-17

## 2025-04-10 ASSESSMENT — ENCOUNTER SYMPTOMS
HEADACHES: 1
VOMITING: 0
RHINORRHEA: 1
DIARRHEA: 0
SORE THROAT: 0
COUGH: 0

## 2025-04-10 ASSESSMENT — FIBROSIS 4 INDEX: FIB4 SCORE: 0.91

## 2025-04-10 NOTE — PROGRESS NOTES
Subjective:   Nataliia Falk is a 29 y.o. female who presents for Ear Drainage (Ear drainage with pain ,left ear > 1.5 days )        Otalgia   There is pain in the left ear. This is a new problem. Episode onset: 2 days. The problem occurs constantly. The problem has been waxing and waning (improved after left ear popped and drained). There has been no fever. The pain is mild. Associated symptoms include ear discharge (serous and purulent), headaches (sinus pressure and congestion) and rhinorrhea. Pertinent negatives include no coughing, diarrhea, hearing loss, sore throat or vomiting. She has tried nothing for the symptoms. The treatment provided no relief. Her past medical history is significant for a chronic ear infection (recurrent).     Review of Systems   HENT:  Positive for ear discharge (serous and purulent), ear pain and rhinorrhea. Negative for hearing loss and sore throat.    Respiratory:  Negative for cough.    Gastrointestinal:  Negative for diarrhea and vomiting.   Neurological:  Positive for headaches (sinus pressure and congestion).       PMH:  has a past medical history of Anemia (10/16/2012), Decreased platelet count (HCC) (6/20/2012), and Pregnancy.    She has no past medical history of Addisons disease ([facility]), Adrenal disorder ([facility]), Allergy, Anxiety, Arrhythmia, Arthritis, Asthma, Blood transfusion, Cancer ([facility]), Cataract, CHF (congestive heart failure) ([facility]), Clotting disorder ([facility]), COPD, Cushings syndrome ([facility]), Depression, Diabetes ([facility]), Diabetic neuropathy ([facility]), GERD (gastroesophageal reflux disease), Glaucoma, Goiter, Head ache, Headache(784.0), Heart attack ([facility]), Heart murmur, HIV (human immunodeficiency virus infection), Hyperlipidemia, Hypertension, IBD (inflammatory bowel disease), Kidney disease, Meningitis, Migraine, Muscle disorder, OSTEOPOROSIS, Parathyroid disorder ([facility]), Pituitary disease ([facility]), Pulmonary emphysema ([facility]), Seizure ([facility]), Sickle cell disease ([facility]),  Stroke (HCC), Substance abuse (HCC), Thyroid disease, Tuberculosis, Ulcer, or Urinary tract infection, site not specified.  MEDS:   Current Outpatient Medications:     amoxicillin-clavulanate (AUGMENTIN) 875-125 MG Tab, Take 1 Tablet by mouth 2 times a day for 7 days., Disp: 14 Tablet, Rfl: 0    ciprofloxacin/dexamethasone (CIPRODEX) 0.3-0.1 % Suspension, Administer 4 Drops into the left ear 2 times a day for 7 days., Disp: 7.5 mL, Rfl: 0    Naloxone (NARCAN) 4 MG/0.1ML Liquid, apply 1 Sprays Nasally As Directed as needed for Opiate reversal, Instr: Spray one (1) nasal spray in one (1) nostril. Repeat after 3 min if no response., Disp: 2 Each, Rfl: 0    acetaminophen (TYLENOL) 500 MG Tab, Take 1-2 Tablets by mouth every 6 hours as needed for Moderate Pain., Disp: 30 Tablet, Rfl: 0    traMADol (ULTRAM) 50 MG Tab, take 1 Tabs Orally every 4 hours  as needed x 7 Days for pain (Patient not taking: Reported on 4/10/2025), Disp: 7 Tablet, Rfl: 0    ibuprofen (MOTRIN) 800 MG Tab, Take 1 Tablet by mouth every 8 hours as needed for Mild Pain. (Patient not taking: Reported on 4/10/2025), Disp: 15 Tablet, Rfl: 0    cyclobenzaprine (FLEXERIL) 10 mg Tab, Take 1 Tablet by mouth 3 times a day as needed for Mild Pain. (Patient not taking: Reported on 4/10/2025), Disp: 15 Tablet, Rfl: 0    ibuprofen (MOTRIN) 600 MG Tab, Take 1 Tablet by mouth every 6 hours as needed for Mild Pain or Moderate Pain. (Patient not taking: Reported on 4/10/2025), Disp: 30 Tablet, Rfl: 0  ALLERGIES: No Known Allergies  SURGHX:   Past Surgical History:   Procedure Laterality Date    SHAWANDA BY LAPAROSCOPY  9/11/2021    Procedure: CHOLECYSTECTOMY, LAPAROSCOPIC;  Surgeon: Noman Aguirre M.D.;  Location: [facility];  Service: General    DILATION AND CURETTAGE N/A 9/9/2021    Procedure: DILATION AND CURETTAGE;  Surgeon: Belinda Cyr D.O.;  Location: [facility];  Service: Obstetrics     SOCHX:  reports that she has never smoked. She  "has never used smokeless tobacco. She reports that she does not currently use drugs. She reports that she does not drink alcohol.  FH: Family history was reviewed, no pertinent findings to report   Objective:   /72   Pulse 86   Temp 36 °C (96.8 °F) (Temporal)   Resp 20   Ht 1.473 m (4' 10\")   Wt 86.5 kg (190 lb 12.9 oz)   SpO2 98%   BMI 39.88 kg/m²   Physical Exam  Vitals reviewed.   Constitutional:       General: She is not in acute distress.     Appearance: Normal appearance. She is well-developed. She is not toxic-appearing.   HENT:      Head: Normocephalic and atraumatic.      Right Ear: Tympanic membrane, ear canal and external ear normal. No mastoid tenderness.      Left Ear: External ear normal. No mastoid tenderness.      Ears:      Comments: View of left TM partially obstructed by cerumen. Left EAC is erythematous and mildly edematous. Pt reports mild pain with otic exam.  Visualized portion of the TM is pearly white.  No visible effusion.  No visible perforation.     Nose: Nose normal.   Cardiovascular:      Rate and Rhythm: Normal rate and regular rhythm.   Pulmonary:      Effort: Pulmonary effort is normal. No respiratory distress.      Breath sounds: No stridor.   Skin:     General: Skin is dry.   Neurological:      Comments: Alert and oriented.    Psychiatric:         Speech: Speech normal.         Behavior: Behavior normal.           Assessment/Plan:   1. Acute otitis media, unspecified otitis media type  - amoxicillin-clavulanate (AUGMENTIN) 875-125 MG Tab; Take 1 Tablet by mouth 2 times a day for 7 days.  Dispense: 14 Tablet; Refill: 0    2. Acute otitis externa of left ear, unspecified type  - ciprofloxacin/dexamethasone (CIPRODEX) 0.3-0.1 % Suspension; Administer 4 Drops into the left ear 2 times a day for 7 days.  Dispense: 7.5 mL; Refill: 0    Patient's exam today consistent with otitis externa.  However described history also concerning for otitis media.  Recommend that we treat " with both topical and oral antibiotics today.  I expect symptoms to gradually improve over the next 3 to 4 days and fully resolved within the next 7 to 10 days.  If symptoms or not improving/resolving within these time frames, new symptoms develop at any point, or symptoms worsen return to clinic or see PCP for reevaluation.

## 2025-04-19 ENCOUNTER — HOSPITAL ENCOUNTER (EMERGENCY)
Facility: MEDICAL CENTER | Age: 30
End: 2025-04-19
Attending: STUDENT IN AN ORGANIZED HEALTH CARE EDUCATION/TRAINING PROGRAM
Payer: COMMERCIAL

## 2025-04-19 VITALS
DIASTOLIC BLOOD PRESSURE: 60 MMHG | HEIGHT: 58 IN | TEMPERATURE: 98 F | WEIGHT: 193.12 LBS | SYSTOLIC BLOOD PRESSURE: 111 MMHG | HEART RATE: 84 BPM | OXYGEN SATURATION: 95 % | RESPIRATION RATE: 15 BRPM | BODY MASS INDEX: 40.54 KG/M2

## 2025-04-19 DIAGNOSIS — R59.0 CERVICAL LYMPHADENOPATHY: ICD-10-CM

## 2025-04-19 DIAGNOSIS — H92.01 RIGHT EAR PAIN: ICD-10-CM

## 2025-04-19 PROCEDURE — 99282 EMERGENCY DEPT VISIT SF MDM: CPT

## 2025-04-19 ASSESSMENT — LIFESTYLE VARIABLES: DO YOU DRINK ALCOHOL: NO

## 2025-04-19 ASSESSMENT — FIBROSIS 4 INDEX: FIB4 SCORE: 0.91

## 2025-04-19 NOTE — ED PROVIDER NOTES
ED Provider Note    CHIEF COMPLAINT  Chief Complaint   Patient presents with    Ear Pain     Previous left ear infection, completed antibiotics yesterday, not having right ear pain       EXTERNAL RECORDS REVIEWED  Outpatient Notes seen in urgent care 4/10/2025 for acute otitis media    HPI/ROS  LIMITATION TO HISTORY   Select: : None  OUTSIDE HISTORIAN(S):      Nataliia Falk is a 29 y.o. female who presents for right ear pain that began yesterday.  Patient reports just finishing antibiotic course for acute otitis media of the left ear.  She was also given topical drops for potential acute otitis externa.  She then begin developing pain in the right ear since then with some tracking of the pain down the neck just inferior to the right ear.  Denies headaches or neck pain.    PAST MEDICAL HISTORY   has a past medical history of Anemia (10/16/2012), Decreased platelet count (HCC) (6/20/2012), and Pregnancy.    SURGICAL HISTORY   has a past surgical history that includes dilation and curettage (N/A, 9/9/2021) and molly by laparoscopy (9/11/2021).    FAMILY HISTORY  Family History   Problem Relation Age of Onset    Cancer Paternal Grandmother         breast cancer     Cancer Paternal Grandfather        SOCIAL HISTORY  Social History     Tobacco Use    Smoking status: Never    Smokeless tobacco: Never   Vaping Use    Vaping status: Never Used   Substance and Sexual Activity    Alcohol use: No    Drug use: Not Currently    Sexual activity: Yes     Partners: Male       CURRENT MEDICATIONS  Home Medications       Reviewed by Mario Huerta R.N. (Registered Nurse) on 04/19/25 at 0101  Med List Status: Not Addressed     Medication Last Dose Status   acetaminophen (TYLENOL) 500 MG Tab  Active   cyclobenzaprine (FLEXERIL) 10 mg Tab  Active   ibuprofen (MOTRIN) 600 MG Tab  Active   ibuprofen (MOTRIN) 800 MG Tab  Active   Naloxone (NARCAN) 4 MG/0.1ML Liquid  Active   traMADol (ULTRAM) 50 MG Tab  Active          "           ALLERGIES  No Known Allergies    PHYSICAL EXAM  VITAL SIGNS: /60   Pulse 84   Temp 36.7 °C (98 °F) (Temporal)   Resp 15   Ht 1.473 m (4' 10\")   Wt 87.6 kg (193 lb 2 oz)   SpO2 95%   BMI 40.36 kg/m²    Physical Exam  Vitals and nursing note reviewed.   Constitutional:       Appearance: She is well-developed.   HENT:      Head: Normocephalic.      Right Ear: Ear canal normal.      Left Ear: Tympanic membrane and ear canal normal.      Ears:      Comments: Right TM erythematous no bulging mild effusion  Eyes:      Extraocular Movements: Extraocular movements intact.      Pupils: Pupils are equal, round, and reactive to light.   Cardiovascular:      Rate and Rhythm: Normal rate and regular rhythm.   Pulmonary:      Effort: Pulmonary effort is normal.      Breath sounds: Normal breath sounds.   Abdominal:      Palpations: Abdomen is soft.      Tenderness: There is no abdominal tenderness.   Musculoskeletal:      Cervical back: Normal range of motion.   Neurological:      Mental Status: She is alert and oriented to person, place, and time.           COURSE & MEDICAL DECISION MAKING    ASSESSMENT, COURSE AND PLAN  Care Narrative: 29-year-old female presenting for right-sided ear pain after recently completing course of antibiotics for left acute otitis media.  On exam there is mild erythema of the right TM the left TM is within normal limits.  There is mild tenderness of the right side of the neck and some mild lymphadenopathy suggesting lymphadenitis related to likely viral infection of the right ear.  Discussed with patient supportive care measures and likelihood of spontaneous resolution as high              ADDITIONAL PROBLEMS MANAGED      DISPOSITION AND DISCUSSIONS  I have discussed management of the patient with the following physicians and NOE's:      Discussion of management with other QHP or appropriate source(s):      Escalation of care considered, and ultimately not " performed:    Barriers to care at this time, including but not limited to: Patient does not have established PCP.     Decision tools and prescription drugs considered including, but not limited to: Antibiotics not indicated and likely viral etiology of symptoms .    FINAL DIAGNOSIS  1. Cervical lymphadenopathy    2. Right ear pain         Electronically signed by: Juan Damon M.D., 4/19/2025 3:26 AM

## 2025-04-19 NOTE — ED TRIAGE NOTES
"Chief Complaint   Patient presents with    Ear Pain     Previous left ear infection, completed antibiotics yesterday, not having right ear pain     Patient ambulatory to triage for the above complaint. Patient states that she initially had a left ear infection and now she feels like it is on the right despite completing antibiotics. Patient now having right neck pain as well as headaches. Patient also having diarrhea since starting antibiotics.    Pt is alert and oriented, speaking in full sentences, follows commands and responds appropriately to questions. Resp are even and unlabored.      Pt placed in lobby. Pt educated on triage process. Pt encouraged to alert staff for any changes.     Patient and staff wearing appropriate PPE.    /76   Pulse 78   Temp 36.2 °C (97.2 °F) (Temporal)   Resp 16   Ht 1.473 m (4' 10\")   Wt 87.6 kg (193 lb 2 oz)   SpO2 97%     "

## (undated) DEVICE — BAG RETRIEVAL 10ML (10EA/BX)

## (undated) DEVICE — TROCAR LAPSCP 100MM 12MM NTHRD - (6/BX)

## (undated) DEVICE — TOWEL STOP TIMEOUT SAFETY FLAG (40EA/CA)

## (undated) DEVICE — NEPTUNE 4 PORT MANIFOLD - (20/PK)

## (undated) DEVICE — LACTATED RINGERS INJ 1000 ML - (14EA/CA 60CA/PF)

## (undated) DEVICE — PAD SANITARY 11IN MAXI IND WRAPPED  (12EA/PK 24PK/CA)

## (undated) DEVICE — ELECTRODE 850 FOAM ADHESIVE - HYDROGEL RADIOTRNSPRNT (50/PK)

## (undated) DEVICE — SET LEADWIRE 5 LEAD BEDSIDE DISPOSABLE ECG (1SET OF 5/EA)

## (undated) DEVICE — TUBE E-T HI-LO CUFF 6.5MM (10EA/BX)

## (undated) DEVICE — TUBING INSUFFLATION PNEUMOCLEAR HIGH-FLOW (10EA/BX)

## (undated) DEVICE — DRESSING NON ADHERENT 3 X 4 - STERILE (100/BX 12BX/CA)

## (undated) DEVICE — GOWN WARMING STANDARD FLEX - (30/CA)

## (undated) DEVICE — SODIUM CHL IRRIGATION 0.9% 1000ML (12EA/CA)

## (undated) DEVICE — ELECTRODE DUAL RETURN W/ CORD - (50/PK)

## (undated) DEVICE — SUTURE 4-0 MONOCRYL PLUS PS-2 - 27 INCH (36/BX)

## (undated) DEVICE — TROCAR Z THREAD 12 X 100 - BLADED (6/BX)

## (undated) DEVICE — CANISTER SUCTION 3000ML MECHANICAL FILTER AUTO SHUTOFF MEDI-VAC NONSTERILE LF DISP  (40EA/CA)

## (undated) DEVICE — GLOVE BIOGEL PI ORTHO SZ 6 SURGICAL PF LF (40PR/BX)

## (undated) DEVICE — STOPCOCK 3-WAY W/SWIVEL LEVER LOCK (50EA/CA)

## (undated) DEVICE — TUBING CLEARLINK DUO-VENT - C-FLO (48EA/CA)

## (undated) DEVICE — TRAY SRGPRP PVP IOD WT PRP - (20/CA)

## (undated) DEVICE — PACK LAP CHOLE OR - (2EA/CA)

## (undated) DEVICE — GLOVE BIOGEL PI INDICATOR SZ 8.0 SURGICAL PF LF -(50/BX 4BX/CA)

## (undated) DEVICE — PROTECTOR ULNA NERVE - (36PR/CA)

## (undated) DEVICE — SLEEVE, VASO, THIGH, MED

## (undated) DEVICE — TROCAR 5X100 BLADED Z-THREAD - KII (6/BX)

## (undated) DEVICE — KIT ANESTHESIA W/CIRCUIT & 3/LT BAG W/FILTER (20EA/CA)

## (undated) DEVICE — SUTURE 0 VICRYL PLUS CT-2 - 27 INCH (36/BX)

## (undated) DEVICE — CHLORAPREP 26 ML APPLICATOR - ORANGE TINT(25/CA)

## (undated) DEVICE — CANNULA W/SEAL 5X100 Z-THRE - ADED KII (12/BX)

## (undated) DEVICE — SET EXTENSION WITH 2 PORTS (48EA/CA) ***PART #2C8610 IS A SUBSTITUTE*****

## (undated) DEVICE — DERMABOND ADVANCED - (12EA/BX)

## (undated) DEVICE — GLOVE BIOGEL INDICATOR SZ 6 SURGICAL PF LTX -(50/BX)

## (undated) DEVICE — TUBE CONNECT SUCTION CLEAR 120 X 1/4" (50EA/CA)"

## (undated) DEVICE — SUCTION INSTRUMENT YANKAUER BULBOUS TIP W/O VENT (50EA/CA)

## (undated) DEVICE — Device

## (undated) DEVICE — HEAD HOLDER JUNIOR/ADULT

## (undated) DEVICE — MASK ANESTHESIA ADULT  - (100/CA)

## (undated) DEVICE — SCISSORS 5MM CVD (6EA/BX)

## (undated) DEVICE — SUTURE GENERAL

## (undated) DEVICE — SENSOR SPO2 NEO LNCS ADHESIVE (20/BX) SEE USER NOTES

## (undated) DEVICE — CLIP MED LG INTNL HRZN TI ESCP - (20/BX)